# Patient Record
Sex: FEMALE | Race: WHITE | NOT HISPANIC OR LATINO | Employment: OTHER | URBAN - NONMETROPOLITAN AREA
[De-identification: names, ages, dates, MRNs, and addresses within clinical notes are randomized per-mention and may not be internally consistent; named-entity substitution may affect disease eponyms.]

---

## 2017-11-30 ENCOUNTER — COMMUNICATION - GICH (OUTPATIENT)
Dept: SURGERY | Facility: OTHER | Age: 67
End: 2017-11-30

## 2018-01-25 ENCOUNTER — DOCUMENTATION ONLY (OUTPATIENT)
Dept: FAMILY MEDICINE | Facility: OTHER | Age: 68
End: 2018-01-25

## 2018-01-25 PROBLEM — E66.9 OBESITY: Status: ACTIVE | Noted: 2018-01-25

## 2018-07-23 NOTE — PROGRESS NOTES
Patient Information     Patient Name  Libby Elliott MRN  [id number] Sex  Female   1950      Letter by Bahman Lazo MD at      Author:  Bahman Lazo MD Service:  (none) Author Type:  (none)    Filed:   Encounter Date:  2017 Status:  (Other)           Libby Elliott  [address]  [place] [address]          2017    Dear Ms. Elliott:    It has come to our attention that you are due for a colonoscopy.  According to our records, your last colonoscopy was done on 2008.  It  is time for your repeat colonoscopy.  Please contact the Surgery department at [phone] and we will be happy to set up this colonoscopy for you.  If you have had a colonoscopy since your last one with us, please let us know so we can update our records.      Sincerely,       Wendy KO LPN  General Surgery      Reviewed and electronically signed by provider.

## 2018-08-27 ENCOUNTER — OFFICE VISIT (OUTPATIENT)
Dept: INTERNAL MEDICINE | Facility: OTHER | Age: 68
End: 2018-08-27
Attending: NURSE PRACTITIONER
Payer: MEDICARE

## 2018-08-27 VITALS
SYSTOLIC BLOOD PRESSURE: 148 MMHG | WEIGHT: 168.8 LBS | BODY MASS INDEX: 27.13 KG/M2 | TEMPERATURE: 97.5 F | HEIGHT: 66 IN | DIASTOLIC BLOOD PRESSURE: 90 MMHG

## 2018-08-27 DIAGNOSIS — Z12.31 ENCOUNTER FOR SCREENING MAMMOGRAM FOR BREAST CANCER: ICD-10-CM

## 2018-08-27 DIAGNOSIS — Z82.49 FAMILY HISTORY OF ISCHEMIC HEART DISEASE: ICD-10-CM

## 2018-08-27 DIAGNOSIS — R03.0 ELEVATED BLOOD PRESSURE READING WITHOUT DIAGNOSIS OF HYPERTENSION: ICD-10-CM

## 2018-08-27 DIAGNOSIS — Z83.49 FAMILY HISTORY OF THYROID DISEASE: ICD-10-CM

## 2018-08-27 DIAGNOSIS — Z13.29 SCREENING FOR THYROID DISORDER: ICD-10-CM

## 2018-08-27 DIAGNOSIS — Z12.11 SPECIAL SCREENING FOR MALIGNANT NEOPLASMS, COLON: Primary | ICD-10-CM

## 2018-08-27 LAB
ALBUMIN SERPL-MCNC: 4.2 G/DL (ref 3.5–5.7)
ALP SERPL-CCNC: 71 U/L (ref 34–104)
ALT SERPL W P-5'-P-CCNC: 14 U/L (ref 7–52)
ANION GAP SERPL CALCULATED.3IONS-SCNC: 8 MMOL/L (ref 3–14)
AST SERPL W P-5'-P-CCNC: 16 U/L (ref 13–39)
BILIRUB SERPL-MCNC: 0.6 MG/DL (ref 0.3–1)
BUN SERPL-MCNC: 17 MG/DL (ref 7–25)
CALCIUM SERPL-MCNC: 9.7 MG/DL (ref 8.6–10.3)
CHLORIDE SERPL-SCNC: 108 MMOL/L (ref 98–107)
CHOLEST SERPL-MCNC: 243 MG/DL
CO2 SERPL-SCNC: 27 MMOL/L (ref 21–31)
CREAT SERPL-MCNC: 0.9 MG/DL (ref 0.6–1.2)
ERYTHROCYTE [DISTWIDTH] IN BLOOD BY AUTOMATED COUNT: 14.1 % (ref 10–15)
GFR SERPL CREATININE-BSD FRML MDRD: 63 ML/MIN/1.7M2
GLUCOSE SERPL-MCNC: 100 MG/DL (ref 70–105)
HCT VFR BLD AUTO: 45.1 % (ref 35–47)
HDLC SERPL-MCNC: 53 MG/DL (ref 23–92)
HGB BLD-MCNC: 14.6 G/DL (ref 11.7–15.7)
LDLC SERPL CALC-MCNC: 168 MG/DL
MCH RBC QN AUTO: 28.1 PG (ref 26.5–33)
MCHC RBC AUTO-ENTMCNC: 32.4 G/DL (ref 31.5–36.5)
MCV RBC AUTO: 87 FL (ref 78–100)
NONHDLC SERPL-MCNC: 190 MG/DL
PLATELET # BLD AUTO: 219 10E9/L (ref 150–450)
POTASSIUM SERPL-SCNC: 3.9 MMOL/L (ref 3.5–5.1)
PROT SERPL-MCNC: 6.8 G/DL (ref 6.4–8.9)
RBC # BLD AUTO: 5.2 10E12/L (ref 3.8–5.2)
SODIUM SERPL-SCNC: 143 MMOL/L (ref 134–144)
TRIGL SERPL-MCNC: 109 MG/DL
TSH SERPL DL<=0.05 MIU/L-ACNC: 4.54 IU/ML (ref 0.34–5.6)
WBC # BLD AUTO: 7.1 10E9/L (ref 4–11)

## 2018-08-27 PROCEDURE — 80061 LIPID PANEL: CPT | Performed by: NURSE PRACTITIONER

## 2018-08-27 PROCEDURE — 36415 COLL VENOUS BLD VENIPUNCTURE: CPT | Performed by: NURSE PRACTITIONER

## 2018-08-27 PROCEDURE — 93005 ELECTROCARDIOGRAM TRACING: CPT | Performed by: NURSE PRACTITIONER

## 2018-08-27 PROCEDURE — 85027 COMPLETE CBC AUTOMATED: CPT | Performed by: NURSE PRACTITIONER

## 2018-08-27 PROCEDURE — 80053 COMPREHEN METABOLIC PANEL: CPT | Performed by: NURSE PRACTITIONER

## 2018-08-27 PROCEDURE — G0463 HOSPITAL OUTPT CLINIC VISIT: HCPCS

## 2018-08-27 PROCEDURE — 99215 OFFICE O/P EST HI 40 MIN: CPT | Mod: 25 | Performed by: NURSE PRACTITIONER

## 2018-08-27 PROCEDURE — 84443 ASSAY THYROID STIM HORMONE: CPT | Performed by: NURSE PRACTITIONER

## 2018-08-27 PROCEDURE — 93010 ELECTROCARDIOGRAM REPORT: CPT | Performed by: INTERNAL MEDICINE

## 2018-08-27 PROCEDURE — G0463 HOSPITAL OUTPT CLINIC VISIT: HCPCS | Mod: 25

## 2018-08-27 ASSESSMENT — ANXIETY QUESTIONNAIRES
GAD7 TOTAL SCORE: 0
1. FEELING NERVOUS, ANXIOUS, OR ON EDGE: NOT AT ALL
3. WORRYING TOO MUCH ABOUT DIFFERENT THINGS: NOT AT ALL
5. BEING SO RESTLESS THAT IT IS HARD TO SIT STILL: NOT AT ALL
7. FEELING AFRAID AS IF SOMETHING AWFUL MIGHT HAPPEN: NOT AT ALL
6. BECOMING EASILY ANNOYED OR IRRITABLE: NOT AT ALL
2. NOT BEING ABLE TO STOP OR CONTROL WORRYING: NOT AT ALL
IF YOU CHECKED OFF ANY PROBLEMS ON THIS QUESTIONNAIRE, HOW DIFFICULT HAVE THESE PROBLEMS MADE IT FOR YOU TO DO YOUR WORK, TAKE CARE OF THINGS AT HOME, OR GET ALONG WITH OTHER PEOPLE: NOT DIFFICULT AT ALL

## 2018-08-27 ASSESSMENT — PAIN SCALES - GENERAL: PAINLEVEL: NO PAIN (0)

## 2018-08-27 ASSESSMENT — PATIENT HEALTH QUESTIONNAIRE - PHQ9: 5. POOR APPETITE OR OVEREATING: NOT AT ALL

## 2018-08-27 NOTE — LETTER
August 27, 2018      Libby THAKUR Earl  [address] [address]        Dear ,    We are writing to inform you of your test results.    Your blood work all looks good except that your cholesterol levels are elevated.  I recommend you begin a low-fat, low-cholesterol diet and daily exercise of 30-60 minutes per day and then repeating fasting lipid panel in 3-6 months.  High cholesterol levels can increase your risk of heart disease.  If the cholesterol levels do not improve with lifestyle modifications then we may want to assess further or discuss other treatment options.    Resulted Orders   CBC with platelets   Result Value Ref Range    WBC 7.1 4.0 - 11.0 10e9/L    RBC Count 5.20 3.8 - 5.2 10e12/L    Hemoglobin 14.6 11.7 - 15.7 g/dL    Hematocrit 45.1 35.0 - 47.0 %    MCV 87 78 - 100 fl    MCH 28.1 26.5 - 33.0 pg    MCHC 32.4 31.5 - 36.5 g/dL    RDW 14.1 10.0 - 15.0 %    Platelet Count 219 150 - 450 10e9/L   Comprehensive metabolic panel   Result Value Ref Range    Sodium 143 134 - 144 mmol/L    Potassium 3.9 3.5 - 5.1 mmol/L    Chloride 108 (H) 98 - 107 mmol/L    Carbon Dioxide 27 21 - 31 mmol/L    Anion Gap 8 3 - 14 mmol/L    Glucose 100 70 - 105 mg/dL    Urea Nitrogen 17 7 - 25 mg/dL    Creatinine 0.90 0.60 - 1.20 mg/dL    GFR Estimate 63 >60 mL/min/1.7m2    GFR Estimate If Black 76 >60 mL/min/1.7m2    Calcium 9.7 8.6 - 10.3 mg/dL    Bilirubin Total 0.6 0.3 - 1.0 mg/dL    Albumin 4.2 3.5 - 5.7 g/dL    Protein Total 6.8 6.4 - 8.9 g/dL    Alkaline Phosphatase 71 34 - 104 U/L    ALT 14 7 - 52 U/L    AST 16 13 - 39 U/L   Thyrotropin GH   Result Value Ref Range    Thyrotropin 4.54 0.34 - 5.60 IU/mL   Lipid Profile   Result Value Ref Range    Cholesterol 243 (H) <200 mg/dL    Triglycerides 109 <150 mg/dL    HDL Cholesterol 53 23 - 92 mg/dL    LDL Cholesterol Calculated 168 (H) <100 mg/dL      Comment:      Above desirable:  100-129 mg/dl  Borderline High:  130-159 mg/dL  High:              160-189 mg/dL  Very high:       >189 mg/dl      Non HDL Cholesterol 190 (H) <130 mg/dL      Comment:      Above Desirable:  130-159 mg/dl  Borderline high:  160-189 mg/dl  High:             190-219 mg/dl  Very high:       >219 mg/dl         If you have any questions or concerns, please call the clinic at the number listed above.       Sincerely,        Jeane Cruz NP

## 2018-08-27 NOTE — PROGRESS NOTES
Subjective:  She is here today for colon cancer screening.  She is due for preoperative optimization.  She reports she is doing well.  Her blood pressure is elevated today in the clinic however when she checked at home 2 months ago blood pressure was normal with a systolic pressure in the 130s.  She has a family history of heart disease in her dad.  She is fasting today and due for lipids.  She has never had thyroid checked but she does have a family history of thyroid disease in her grandmother.  She has not had mammography in several years.  She would like to have mammogram scheduled.  She is not interested in bone density scan or immunizations at this time.  She has never had any problems with anesthesia.  No skin infections.    Patient Active Problem List   Diagnosis     Obesity     Past Medical History:   Diagnosis Date     Asymptomatic menopausal state     approximately age 47     Past Surgical History:   Procedure Laterality Date     ANKLE SURGERY      achilles tendon repair and heel spur removal      SECTION      x three     COLONOSCOPY      08,Next colonoscopy due in 2018.     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     Social History     Social History     Marital status:      Spouse name: N/A     Number of children: N/A     Years of education: N/A     Occupational History     Not on file.     Social History Main Topics     Smoking status: Never Smoker     Smokeless tobacco: Never Used     Alcohol use Yes      Comment: very seldom      Drug use: No     Sexual activity: Not on file     Other Topics Concern     Not on file     Social History Narrative    Marcela Child; 82.    Citlali Child; 86.    Dexter Child; 74.     Ronald Spouse; date of birth 53.     Family History   Problem Relation Age of Onset     Breast Cancer Mother   "    Cancer-breast,living in her 80s     Cancer Mother      Cancer,kidney cancer     HEART DISEASE Father      Cancer Sister      Cancer,lung cancer, was a smoker     Cancer Sister      Cancer,lung CA     Other - See Comments Sister      hysterectomy     Family History Negative Sister      Good Health     Family History Negative Brother      Good Health     Family History Negative Brother      Good Health     Family History Negative Daughter      Good Health,02/28/80.     Family History Negative Daughter      Good Health,01/24/84.     Family History Negative Son      Good Health,03/21/72.     Family History Negative Other      Good Health,date of birth [birth year].     Thyroid Disease Maternal Grandmother      No current outpatient prescriptions on file.     Review of patient's allergies indicates no known allergies.      Review of Systems:  Review of Systems  12 point complete review of systems discussed with patient, see HPI for positive findings.    Objective:   /90 (BP Location: Right arm, Patient Position: Chair, Cuff Size: Adult Regular)  Temp 97.5  F (36.4  C) (Tympanic)  Ht 5' 5.5\" (1.664 m)  Wt 168 lb 12.8 oz (76.6 kg)  Breastfeeding? No  BMI 27.66 kg/m2  Physical Exam  Pleasant female no acute distress.  Affect normal.  Alert and oriented ×4.  Sclera nonicteric.  Conjunctiva noninflamed.  TMs clear bilaterally.  Mucous membranes moist.  Throat without erythema.  Neck supple and without adenopathy.  No thyromegaly.  No carotid bruits.  Lung fields clear to auscultation throughout.  Cardiovascular regular rate and rhythm.  Abdomen soft without masses, tenderness and organomegaly.  No hepatosplenomegaly.  No abdominal bruits or pulsatile masses.  Extremities without edema.  DPPT intact bilaterally.  Capillary refill less than 3 seconds.  Gait is stable.  EKG: Sinus rhythm, rate 63 with left anterior fascicular block.  No previous for comparison    Assessment:    ICD-10-CM    1. Special screening for " malignant neoplasms, colon Z12.11 GASTROENTEROLOGY ADULT REF PROCEDURE ONLY   2. Elevated blood pressure reading without diagnosis of hypertension R03.0 CBC with platelets     Comprehensive metabolic panel   3. Encounter for screening mammogram for breast cancer Z12.31 MA Screen Bilateral w/Epifanio   4. Family history of ischemic heart disease Z82.49 Lipid Profile     EKG 12-lead, tracing only   5. Screening for thyroid disorder Z13.29 Thyrotropin GH   6. Family history of thyroid disease Z83.49        Plan:   Patient is need of colonoscopy screening.  Her test has been scheduled.  May proceed with colonoscopy.  She will avoid aspirin, NSAIDs and all vitamins and nutritional supplements.  Blood pressure is mildly elevated today in clinic with previous readings being normal when checked at home.  I have asked that she check her blood pressure 2 or 3 times weekly with a goal systolic blood pressure less than 135.  If she is finding that is frequently higher than she needs to follow-up for treatment.  She is referred for bilateral mammography.  She has a family history of heart disease in her father and thyroid disease in her grandmother.  She will have labs today to include CBC, chemistry panel, lipids and TSH.  Recommended bone density scan and consideration of immunizations to include pneumonia vaccine and influenza vaccine.    40 minutes face-to-face time sent with patient with a 50% in care coordination and counseling.  THERESA Barry   8/27/2018  9:43 AM

## 2018-08-27 NOTE — NURSING NOTE
"Chief Complaint   Patient presents with     Physical     And needs colonoscopy done .     Initial /90 (BP Location: Right arm, Patient Position: Chair, Cuff Size: Adult Regular)  Temp 97.5  F (36.4  C) (Tympanic)  Ht 5' 5.5\" (1.664 m)  Wt 168 lb 12.8 oz (76.6 kg)  Breastfeeding? No  BMI 27.66 kg/m2 Estimated body mass index is 27.66 kg/(m^2) as calculated from the following:    Height as of this encounter: 5' 5.5\" (1.664 m).    Weight as of this encounter: 168 lb 12.8 oz (76.6 kg).  Medication Reconciliation: complete       Patient denies any fever of chills in the past two months, no history of MRSA or sleep apnea. She tells me does not have high blood pressure is/has been treated for blood pressure. Needs tetanus. She has no health care directive on file.       Maryann Reilly LPN on 8/27/2018 at 8:56 AM    "

## 2018-08-27 NOTE — MR AVS SNAPSHOT
After Visit Summary   8/27/2018    Libby Elliott    MRN: [id number]           Patient Information     Date Of Birth          1950        Visit Information        Provider Department      8/27/2018 9:00 AM Jeane Cruz NP [facility]        Today's Diagnoses     Special screening for malignant neoplasms, colon    -  1    Elevated blood pressure reading without diagnosis of hypertension        Encounter for screening mammogram for breast cancer        Family history of ischemic heart disease        Screening for thyroid disorder        Family history of thyroid disease           Follow-ups after your visit        Additional Services     GASTROENTEROLOGY ADULT REF PROCEDURE ONLY                 Future tests that were ordered for you today     Open Future Orders        Priority Expected Expires Ordered    Lipid Profile Routine  8/28/2019 8/27/2018    MA Screen Bilateral w/Epifanio Routine  8/27/2019 8/27/2018    CBC with platelets Routine  8/28/2019 8/27/2018    Comprehensive metabolic panel Routine  8/28/2019 8/27/2018    Thyrotropin GH Routine  8/28/2019 8/27/2018            Who to contact     If you have questions or need follow up information about today's clinic visit or your schedule please contact [facility] directly at [phone].  Normal or non-critical lab and imaging results will be communicated to you by MyChart, letter or phone within 4 business days after the clinic has received the results. If you do not hear from us within 7 days, please contact the clinic through MyChart or phone. If you have a critical or abnormal lab result, we will notify you by phone as soon as possible.  Submit refill requests through [employer] or call your pharmacy and they will forward the refill request to us. Please allow 3 business days for your refill to be completed.          Additional Information About Your Visit        Care EveryWhere ID     This is your  "Care EveryWhere ID. This could be used by other organizations to access your [place] medical records  [id number]        Your Vitals Were     Temperature Height Breastfeeding? BMI (Body Mass Index)          97.5  F (36.4  C) (Tympanic) 5' 5.5\" (1.664 m) No 27.66 kg/m2         Blood Pressure from Last 3 Encounters:   08/27/18 148/90    Weight from Last 3 Encounters:   08/27/18 168 lb 12.8 oz (76.6 kg)              We Performed the Following     EKG 12-lead, tracing only     GASTROENTEROLOGY ADULT REF PROCEDURE ONLY        Primary Care Provider Office Phone # Fax #    [facility] [phone] [address]  [facility] [address]        Equal Access to Services     JUN CID : [place], waaxda luqadaha, qaybta kaalmada adewarrenyagio, brady reid . So [facility] [phone].    ATENCIÓN: Si habla español, tiene a flowers disposición servicios gratuitos de asistencia lingüística. Llame al [phone].    We comply with applicable federal civil rights laws and [place] laws. We do not discriminate on the basis of race, color, national origin, age, disability, sex, sexual orientation, or gender identity.            Thank you!     Thank you for choosing [facility]  for your care. Our goal is always to provide you with excellent care. Hearing back from our patients is one way we can continue to improve our services. Please take a few minutes to complete the written survey that you may receive in the mail after your visit with us. Thank you!             Your Updated Medication List - Protect others around you: Learn how to safely use, store and throw away your medicines at www.disposemymeds.org.      Notice  As of 8/27/2018  9:53 AM    You have not been prescribed any medications.      "

## 2018-08-28 DIAGNOSIS — Z00.00 HEALTHCARE MAINTENANCE: Primary | ICD-10-CM

## 2018-08-28 RX ORDER — BISACODYL 5 MG/1
TABLET, DELAYED RELEASE ORAL
Qty: 2 TABLET | Refills: 0 | Status: ON HOLD | OUTPATIENT
Start: 2018-08-28 | End: 2018-10-01

## 2018-08-28 RX ORDER — POLYETHYLENE GLYCOL 3350, SODIUM CHLORIDE, SODIUM BICARBONATE, POTASSIUM CHLORIDE 420; 11.2; 5.72; 1.48 G/4L; G/4L; G/4L; G/4L
4000 POWDER, FOR SOLUTION ORAL ONCE
Qty: 4000 ML | Refills: 0 | Status: SHIPPED | OUTPATIENT
Start: 2018-08-28 | End: 2018-08-28

## 2018-08-28 ASSESSMENT — ANXIETY QUESTIONNAIRES: GAD7 TOTAL SCORE: 0

## 2018-08-28 ASSESSMENT — PATIENT HEALTH QUESTIONNAIRE - PHQ9: SUM OF ALL RESPONSES TO PHQ QUESTIONS 1-9: 0

## 2018-08-28 NOTE — TELEPHONE ENCOUNTER
Screening Questions for the Scheduling of Screening Colonoscopies   (If Colonoscopy is diagnostic, Provider should review the chart before scheduling.)  Are you younger than 50 or older than 80?  NO   Do you take aspirin or fish oil?  NO  (if yes, tell patient to stop 1 week prior to Colonoscopy)  Do you take warfarin (Coumadin), clopidogrel (Plavix), apixaban (Eliquis), dabigatram (Pradaxa), rivaroxaban (Xarelto) or any blood thinner? NO   Do you use oxygen at home?   NO   Do you have kidney disease?   NO   Are you on dialysis?  NO   Have you had a stroke or heart attack in the last year?   NO   Have you had a stent in your heart or any blood vessel in the last year?  NO   Have you had a transplant of any organ?  NO   Have you had a colonoscopy or upper endoscopy (EGD) before?   YES          When?  2008  -  [facility]    Date of scheduled Colonoscopy. 10/01/2018  Provider  MICHELL   Pharmacy   [facility]

## 2018-09-04 ENCOUNTER — HOSPITAL ENCOUNTER (OUTPATIENT)
Dept: MAMMOGRAPHY | Facility: OTHER | Age: 68
Discharge: HOME | End: 2018-09-04
Attending: NURSE PRACTITIONER | Admitting: NURSE PRACTITIONER
Payer: MEDICARE

## 2018-09-04 DIAGNOSIS — Z12.31 ENCOUNTER FOR SCREENING MAMMOGRAM FOR BREAST CANCER: ICD-10-CM

## 2018-09-04 PROCEDURE — 77063 BREAST TOMOSYNTHESIS BI: CPT

## 2018-09-09 ENCOUNTER — OFFICE VISIT (OUTPATIENT)
Dept: FAMILY MEDICINE | Facility: OTHER | Age: 68
End: 2018-09-09
Payer: MEDICARE

## 2018-09-09 VITALS
SYSTOLIC BLOOD PRESSURE: 144 MMHG | WEIGHT: 167.4 LBS | TEMPERATURE: 97.6 F | DIASTOLIC BLOOD PRESSURE: 98 MMHG | BODY MASS INDEX: 27.43 KG/M2 | OXYGEN SATURATION: 99 % | HEART RATE: 69 BPM
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
## 2018-09-09 DIAGNOSIS — S30.860A TICK BITE OF BACK, INITIAL ENCOUNTER: Primary | ICD-10-CM

## 2018-09-09 PROCEDURE — G0463 HOSPITAL OUTPT CLINIC VISIT: HCPCS

## 2018-09-09 PROCEDURE — 99213 OFFICE O/P EST LOW 20 MIN: CPT | Performed by: FAMILY MEDICINE

## 2018-09-09 RX ORDER — DOXYCYCLINE HYCLATE 100 MG
100 TABLET ORAL 2 TIMES DAILY
Qty: 28 TABLET | Refills: 0 | Status: SHIPPED | OUTPATIENT
Start: 2018-09-09 | End: 2018-09-24

## 2018-09-09 NOTE — PROGRESS NOTES
SUBJECTIVE:   Libby Elliott is a 67 year old female who presents to clinic today for the following health issues:    PARVIN Souza is here for concerns of a tick bite.  She noticed it on her L sided mid back, just below bra line this morning after her shower.  She is outside, walking throughout their fields often, uncertain how long it was attached.  Does not have tick for review, but said it was extremely small with some white on the back.  Unable to ID on our tick cards here.  She denies any fever, chills, rash, joint pains, headaches.    Patient Active Problem List    Diagnosis Date Noted     Obesity 2018     Priority: Medium     Past Medical History:   Diagnosis Date     Asymptomatic menopausal state     approximately age 47      Past Surgical History:   Procedure Laterality Date     ANKLE SURGERY      achilles tendon repair and heel spur removal      SECTION      x three     COLONOSCOPY      08,Next colonoscopy due in 2018.     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     Family History   Problem Relation Age of Onset     Breast Cancer Mother      Cancer-breast,living in her 80s     Cancer Mother      Cancer,kidney cancer     HEART DISEASE Father      Cancer Sister      Cancer,lung cancer, was a smoker     Cancer Sister      Cancer,lung CA     Other - See Comments Sister      hysterectomy     Family History Negative Sister      Good Health     Family History Negative Brother      Good Health     Family History Negative Brother      Good Health     Family History Negative Daughter      Good Health,80.     Family History Negative Daughter      Good Health,84.     Family History Negative Son      Good Health,72.     Family History Negative Other      Good Health,date of birth 53.     Thyroid Disease Maternal Grandmother       Social History   Substance Use Topics     Smoking status: Never Smoker     Smokeless tobacco: Never Used     Alcohol use Yes      Comment: very seldom      Social History     Social History Narrative    Marcela Child; 02/28/80.    Citlali Child; 01/24/84.    Dexter Child; 03/21/72.     Ronald Spouse; date of birth 12/04/51.     Current Outpatient Prescriptions   Medication Sig Dispense Refill     doxycycline (VIBRA-TABS) 100 MG tablet Take 1 tablet (100 mg) by mouth 2 times daily 28 tablet 0     bisacodyl (DULCOLAX) 5 MG EC tablet Take as directed by colonoscopy prep instructions (Patient not taking: Reported on 9/9/2018) 2 tablet 0     No Known Allergies    Review of Systems   All other systems reviewed and are negative.       OBJECTIVE:     BP (!) 144/98  Pulse 69  Temp 97.6  F (36.4  C) (Tympanic)  Wt 167 lb 6.4 oz (75.9 kg)  SpO2 99%  Breastfeeding? No  BMI 27.43 kg/m2  Body mass index is 27.43 kg/(m^2).  Physical Exam   Constitutional: She appears well-developed and well-nourished. No distress.   HENT:   Head: Normocephalic and atraumatic.   Skin: She is not diaphoretic.        Quarter size erythematous spot with tiny small black eschar centrally.  No obvious tick parts.  Non tender.  No fluctuance or induration.   Nursing note and vitals reviewed.    Diagnostic Test Results:  none     ASSESSMENT/PLAN:     1. Tick bite of back, initial encounter  Discussed options of monitoring for symptoms vs treating.  Patient elects to treat due to endemic area.  Rx for doxycycline x 14 days.  Declines testing due to high likelihood of being seronegative at this time.    Will notify PCP of further concerns, symptoms, etc.  Encouraged to take med with food.  - doxycycline (VIBRA-TABS) 100 MG tablet; Take 1 tablet (100 mg) by mouth 2 times daily  Dispense: 28 tablet; Refill: 0    DO DYLAN Olmedo [facility]

## 2018-09-09 NOTE — NURSING NOTE
Patient presents to clinic today for a tick bite. Patient found it this morning on her left mid back. Her  took it off with a tweezers. There was no blood.  Nia Thompson CMA..............9/9/2018........10:51 AM

## 2018-09-09 NOTE — MR AVS SNAPSHOT
After Visit Summary   9/9/2018    Libby Elliott    MRN: [id number]           Patient Information     Date Of Birth          1950        Visit Information        Provider Department      9/9/2018 10:45 AM Heidi Patel DO [facility]        Today's Diagnoses     Tick bite of back, initial encounter    -  1       Follow-ups after your visit        Your next 10 appointments already scheduled     Oct 01, 2018   Procedure with Bahman Lazo MD   [facility] ([facility])    [address]  [place] [address] [phone]              Who to contact     If you have questions or need follow up information about today's clinic visit or your schedule please contact [facility] directly at [phone].  Normal or non-critical lab and imaging results will be communicated to you by MyChart, letter or phone within 4 business days after the clinic has received the results. If you do not hear from us within 7 days, please contact the clinic through MyChart or phone. If you have a critical or abnormal lab result, we will notify you by phone as soon as possible.  Submit refill requests through [employer] or call your pharmacy and they will forward the refill request to us. Please allow 3 business days for your refill to be completed.          Additional Information About Your Visit        Care EveryWhere ID     This is your Care EveryWhere ID. This could be used by other organizations to access your [place] medical records  [id number]        Your Vitals Were     Pulse Temperature Pulse Oximetry Breastfeeding? BMI (Body Mass Index)       69 97.6  F (36.4  C) (Tympanic) 99% No 27.43 kg/m2        Blood Pressure from Last 3 Encounters:   09/09/18 (!) 144/98   08/27/18 148/90    Weight from Last 3 Encounters:   09/09/18 167 lb 6.4 oz (75.9 kg)   08/27/18 168 lb 12.8 oz (76.6 kg)              Today, you had the  following     No orders found for display         Today's Medication Changes          These changes are accurate as of 9/9/18 12:17 PM.  If you have any questions, ask your nurse or doctor.               Start taking these medicines.        Dose/Directions    doxycycline 100 MG tablet   Commonly known as:  VIBRA-TABS   Used for:  Tick bite of back, initial encounter   Started by:  Heidi Patel DO        Dose:  100 mg   Take 1 tablet (100 mg) by mouth 2 times daily   Quantity:  28 tablet   Refills:  0            Where to get your medicines      These medications were sent to [facility] [address] [address]     Phone:  [phone]     doxycycline 100 MG tablet                Primary Care Provider Office Phone # Fax #    [facility] [phone]       [address] [facility] [address]        Equal Access to Services     JUN CID : Hadii ginger almonteo [place], waaxda luqadaha, qaybta kaalmada adeegyada, brady reid . So [facility] [phone].    ATENCIÓN: Si habla español, tiene a flowers disposición servicios gratuitos de asistencia lingüística. Llame al [phone].    We comply with applicable federal civil rights laws and [place] laws. We do not discriminate on the basis of race, color, national origin, age, disability, sex, sexual orientation, or gender identity.            Thank you!     Thank you for choosing [facility]  for your care. Our goal is always to provide you with excellent care. Hearing back from our patients is one way we can continue to improve our services. Please take a few minutes to complete the written survey that you may receive in the mail after your visit with us. Thank you!             Your Updated Medication List - Protect others around you: Learn how to safely use, store and throw away your medicines at  www.disposemymeds.org.          This list is accurate as of 9/9/18 12:17 PM.  Always use your most recent med list.                   Brand Name Dispense Instructions for use Diagnosis    bisacodyl 5 MG EC tablet    DULCOLAX    2 tablet    Take as directed by colonoscopy prep instructions    Healthcare maintenance       doxycycline 100 MG tablet    VIBRA-TABS    28 tablet    Take 1 tablet (100 mg) by mouth 2 times daily    Tick bite of back, initial encounter

## 2018-09-13 ENCOUNTER — TELEPHONE (OUTPATIENT)
Dept: INTERNAL MEDICINE | Facility: OTHER | Age: 68
End: 2018-09-13

## 2018-09-13 NOTE — TELEPHONE ENCOUNTER
Patient is schedule for a colonocopy on 10/1.  She has been having some pain when sitting and other and would like to know if she should see RKV before the colonocopy to discuss.  Please call to discuss and advise.  Kath Pedraza on 9/13/2018 at 11:51 AM

## 2018-09-13 NOTE — TELEPHONE ENCOUNTER
Patient concerned about having some tenderness and burning the last couple weeks in rectum. Wonders if she should see you before colonoscopy on  10/1/18?  Haydee Irene LPN ....................9/13/2018   12:49 PM

## 2018-09-14 NOTE — TELEPHONE ENCOUNTER
Spoke with patient and she said this can wait until Monday for Dr. Clifton HARPER.  Katie Ferguson LPN..........9/14/2018  9:50 AM

## 2018-09-14 NOTE — TELEPHONE ENCOUNTER
Nursing: Please discuss this with the provider who is doing her colonoscopy.  I suspect they can evaluate the area on the day of the colonoscopy

## 2018-09-17 NOTE — TELEPHONE ENCOUNTER
Spoke with patient and she does have some discomfort in the rectum, hard to sit.  She may follow up with Sylvia Cruz NP before her colonoscopy.  Katie Ferguson LPN..........9/17/2018  8:00 AM

## 2018-09-26 PROBLEM — Z00.00 HEALTHCARE MAINTENANCE: Status: ACTIVE | Noted: 2018-09-26

## 2018-10-01 ENCOUNTER — HOSPITAL ENCOUNTER (OUTPATIENT)
Facility: OTHER | Age: 68
Discharge: HOME | End: 2018-10-01
Attending: SURGERY | Admitting: SURGERY
Payer: MEDICARE

## 2018-10-01 ENCOUNTER — SURGERY (OUTPATIENT)
Age: 68
End: 2018-10-01

## 2018-10-01 ENCOUNTER — ANESTHESIA (OUTPATIENT)
Dept: SURGERY | Facility: OTHER | Age: 68
End: 2018-10-01
Payer: MEDICARE

## 2018-10-01 ENCOUNTER — ANESTHESIA EVENT (OUTPATIENT)
Dept: SURGERY | Facility: OTHER | Age: 68
End: 2018-10-01
Payer: MEDICARE

## 2018-10-01 VITALS
RESPIRATION RATE: 16 BRPM | SYSTOLIC BLOOD PRESSURE: 109 MMHG | WEIGHT: 165 LBS | HEART RATE: 69 BPM | TEMPERATURE: 97.4 F | BODY MASS INDEX: 27.04 KG/M2 | DIASTOLIC BLOOD PRESSURE: 57 MMHG | OXYGEN SATURATION: 98 %

## 2018-10-01 PROBLEM — K63.5 COLON POLYPS: Status: ACTIVE | Noted: 2018-10-01

## 2018-10-01 PROCEDURE — 25000128 H RX IP 250 OP 636: Performed by: NURSE ANESTHETIST, CERTIFIED REGISTERED

## 2018-10-01 PROCEDURE — 45384 COLONOSCOPY W/LESION REMOVAL: CPT | Performed by: NURSE ANESTHETIST, CERTIFIED REGISTERED

## 2018-10-01 PROCEDURE — 25000125 ZZHC RX 250: Performed by: NURSE ANESTHETIST, CERTIFIED REGISTERED

## 2018-10-01 PROCEDURE — 25000128 H RX IP 250 OP 636: Performed by: SURGERY

## 2018-10-01 PROCEDURE — 45384 COLONOSCOPY W/LESION REMOVAL: CPT | Mod: PT | Performed by: SURGERY

## 2018-10-01 PROCEDURE — 45380 COLONOSCOPY AND BIOPSY: CPT | Performed by: SURGERY

## 2018-10-01 PROCEDURE — 40000010 ZZH STATISTIC ANES STAT CODE-CRNA PER MINUTE: Performed by: SURGERY

## 2018-10-01 PROCEDURE — 25000132 ZZH RX MED GY IP 250 OP 250 PS 637: Mod: GY | Performed by: SURGERY

## 2018-10-01 PROCEDURE — 25000125 ZZHC RX 250: Performed by: SURGERY

## 2018-10-01 PROCEDURE — 88305 TISSUE EXAM BY PATHOLOGIST: CPT

## 2018-10-01 RX ORDER — LIDOCAINE HYDROCHLORIDE 20 MG/ML
INJECTION, SOLUTION INFILTRATION; PERINEURAL PRN
Status: DISCONTINUED | OUTPATIENT
Start: 2018-10-01 | End: 2018-10-01

## 2018-10-01 RX ORDER — SODIUM CHLORIDE, SODIUM LACTATE, POTASSIUM CHLORIDE, CALCIUM CHLORIDE 600; 310; 30; 20 MG/100ML; MG/100ML; MG/100ML; MG/100ML
INJECTION, SOLUTION INTRAVENOUS CONTINUOUS
Status: DISCONTINUED | OUTPATIENT
Start: 2018-10-01 | End: 2018-10-01 | Stop reason: HOSPADM

## 2018-10-01 RX ORDER — SIMETHICONE
LIQUID (ML) MISCELLANEOUS PRN
Status: DISCONTINUED | OUTPATIENT
Start: 2018-10-01 | End: 2018-10-01 | Stop reason: HOSPADM

## 2018-10-01 RX ORDER — PROPOFOL 10 MG/ML
INJECTION, EMULSION INTRAVENOUS PRN
Status: DISCONTINUED | OUTPATIENT
Start: 2018-10-01 | End: 2018-10-01

## 2018-10-01 RX ORDER — PROPOFOL 10 MG/ML
INJECTION, EMULSION INTRAVENOUS CONTINUOUS PRN
Status: DISCONTINUED | OUTPATIENT
Start: 2018-10-01 | End: 2018-10-01

## 2018-10-01 RX ORDER — LIDOCAINE 40 MG/G
CREAM TOPICAL
Status: DISCONTINUED | OUTPATIENT
Start: 2018-10-01 | End: 2018-10-01 | Stop reason: HOSPADM

## 2018-10-01 RX ORDER — NALOXONE HYDROCHLORIDE 0.4 MG/ML
.1-.4 INJECTION, SOLUTION INTRAMUSCULAR; INTRAVENOUS; SUBCUTANEOUS
Status: DISCONTINUED | OUTPATIENT
Start: 2018-10-01 | End: 2018-10-01 | Stop reason: HOSPADM

## 2018-10-01 RX ORDER — ONDANSETRON 2 MG/ML
4 INJECTION INTRAMUSCULAR; INTRAVENOUS
Status: DISCONTINUED | OUTPATIENT
Start: 2018-10-01 | End: 2018-10-01 | Stop reason: HOSPADM

## 2018-10-01 RX ORDER — FLUMAZENIL 0.1 MG/ML
0.2 INJECTION, SOLUTION INTRAVENOUS
Status: DISCONTINUED | OUTPATIENT
Start: 2018-10-01 | End: 2018-10-01 | Stop reason: HOSPADM

## 2018-10-01 RX ADMIN — PROPOFOL 135 MCG/KG/MIN: 10 INJECTION, EMULSION INTRAVENOUS at 08:18

## 2018-10-01 RX ADMIN — LIDOCAINE HYDROCHLORIDE 80 MG: 20 INJECTION, SOLUTION INFILTRATION; PERINEURAL at 08:18

## 2018-10-01 RX ADMIN — SODIUM CHLORIDE, SODIUM LACTATE, POTASSIUM CHLORIDE, AND CALCIUM CHLORIDE: 600; 310; 30; 20 INJECTION, SOLUTION INTRAVENOUS at 07:36

## 2018-10-01 RX ADMIN — Medication 0.6 ML: at 08:46

## 2018-10-01 RX ADMIN — WATER 200 ML: 1 IRRIGANT IRRIGATION at 08:46

## 2018-10-01 RX ADMIN — PROPOFOL 80 MG: 10 INJECTION, EMULSION INTRAVENOUS at 08:18

## 2018-10-01 NOTE — IP AVS SNAPSHOT
[facility]    [address]    [place] [address]    Phone:  [phone]    Fax:  [phone]                                       After Visit Summary   10/1/2018    Libby Elliott    MRN: [id number]           After Visit Summary Signature Page     I have received my discharge instructions, and my questions have been answered. I have discussed any challenges I see with this plan with the nurse or doctor.    ..........................................................................................................................................  Patient/Patient Representative Signature      ..........................................................................................................................................  Patient Representative Print Name and Relationship to Patient    ..................................................               ................................................  Date                                   Time    ..........................................................................................................................................  Reviewed by Signature/Title    ...................................................              ..............................................  Date                                               Time          22EPIC Rev 08/18

## 2018-10-01 NOTE — ANESTHESIA POSTPROCEDURE EVALUATION
Patient: Libby Elliott    Procedure(s):  Colonoscopy - Wound Class: III-Contaminated    Diagnosis:screening  Diagnosis Additional Information: No value filed.    Anesthesia Type:  MAC    Note:  Anesthesia Post Evaluation    Patient location during evaluation: Phase 2 and Endoscopy Recovery  Patient participation: Able to fully participate in evaluation  Level of consciousness: awake and alert  Pain management: adequate  Airway patency: patent  Cardiovascular status: acceptable  Respiratory status: acceptable  Hydration status: acceptable  PONV: none     Anesthetic complications: None          Last vitals:  Vitals:    10/01/18 0900 10/01/18 0915 10/01/18 0930   BP: 104/60 101/63 109/57   Pulse:      Resp:      Temp:      SpO2: 99% 99% 98%         Electronically Signed By: ELEANOR De Leon CRNA  October 1, 2018  9:50 AM

## 2018-10-01 NOTE — ANESTHESIA PREPROCEDURE EVALUATION
Anesthesia Evaluation     . Pt has had prior anesthetic. Type: MAC    No history of anesthetic complications          ROS/MED HX    ENT/Pulmonary:  - neg pulmonary ROS     Neurologic:  - neg neurologic ROS     Cardiovascular:  - neg cardiovascular ROS       METS/Exercise Tolerance:  >4 METS   Hematologic:  - neg hematologic  ROS       Musculoskeletal:  - neg musculoskeletal ROS       GI/Hepatic:  - neg GI/hepatic ROS       Renal/Genitourinary:  - ROS Renal section negative       Endo:  - neg endo ROS   (+) Obesity, .      Psychiatric:  - neg psychiatric ROS       Infectious Disease:  - neg infectious disease ROS       Malignancy:      - no malignancy   Other:    (+) No chance of pregnancy C-spine cleared: N/A, no H/O Chronic Pain,no other significant disability   - neg other ROS                 Physical Exam  Normal systems: cardiovascular, pulmonary and dental    Airway   Mallampati: II  TM distance: >3 FB  Neck ROM: full    Dental     Cardiovascular   Rhythm and rate: regular and normal      Pulmonary    breath sounds clear to auscultation                    Anesthesia Plan      History & Physical Review      ASA Status:  2 .    NPO Status:  > 6 hours    Plan for MAC with Propofol induction.          Postoperative Care      Consents  Anesthetic plan, risks, benefits and alternatives discussed with:  Patient..                          .

## 2018-10-01 NOTE — IP AVS SNAPSHOT
MRN:[id number]                      After Visit Summary   10/1/2018    Libby Elliott    MRN: [id number]           Thank you!     Thank you for choosing [facility] for your care. Our goal is always to provide you with excellent care. Hearing back from our patients is one way we can continue to improve our services. Please take a few minutes to complete the written survey that you may receive in the mail after you visit with us. Thank you!        Patient Information     Date Of Birth          1950        About your hospital stay     You were admitted on:  October 1, 2018 You last received care in the:  [facility]    You were discharged on:  October 1, 2018       Who to Call     For medical emergencies, please call 911.  For non-urgent questions about your medical care, please call your primary care provider or clinic, [phone]  For questions related to your surgery, please call your surgery clinic        Attending Provider     Provider Specialty    Bahman Lazo MD Surgery       Primary Care Provider Office Phone # Fax #    [facility] [phone]      After Care Instructions     Discharge Instructions       No driving or operating machinery until the day after procedure..postfiber            Discharge Instructions       Resume pre procedure diet and medications.  Your doctor recommends that you eat 25 to 30 Grams of fiber daily. The following are some examples of fiber amounts in different foods.    Fruits: Apple (with skin) 1 medium = 4.4 Grams   Banana      1 medium = 3.1 Grams   Oranges     1 orange = 3.1 Grams   Prunes     1 cup, pitted = 12.4 Grams    Juices: Apple, unsweetened w/ added ascorbic acid  1 cup = 0.5 Grams    Grapefruit, white, canned,sweetened  1 cup = 0.2 Grams    Grape, unsweetened w/ added ascorbic acid  1 cup = 0.5 Grams    Orange     1 cup = 0.7 Grams    Vegetables:   Cooked: Green Beans   1 cup = 4.0 Grams        Carrots   1/2 cup sliced = 2.3 Grams       Peas       1 cup = 8.8 Grams       Potato (baked, with skin)  1 medium = 3.8 Grams    Raw: Cucumber (with peel)  1 cucumber = 1.5 Grams            Lettuce     1 cup shredded = 0.5 Grams            Tomato   1 medium tomato = 1.5 Grams            Spinach  1 cup = 0.7 Grams    Legumes: Baked beans, canned, no salt added  1 cup = 13.9 Grams         Kidney Beans, canned  1 cup = 13.6 Grams         Barnard Beans, canned     1 cup = 11.6 Grams         Lentils, boiled   1 cup = 15.6 Grams    Breads, Pastas, Flours: Bran muffins   1 medium muffin = 5.2 Grams           Oatmeal, cooked  1 cup = 4.0 Grams           White Bread   1 slice = 0.6 Grams           Whole- wheat bread = 1.9 Grams    Pasta and rice, cooked: Macaroni  1 cup = 2.5 Grams           Rice, Brown  1 cup = 3.5 Grams           Rice, white   1 cup = 0.6 Grams           Spaghetti (regular) 1 cup = 2.5 Grams    Nuts: Almonds   1 cup = 17.4 Grams            Peanuts    1 cup = 12.4 Grams            Discharge Instructions       Call [phone] for questions or concerns. Call for increased abdominal pain, rectal bleeding, persistent vomiting or fever over 101.5 F  You will receive a letter in about one week with results.                  Pending Results     Date and Time Order Name Status Description    10/1/2018 0831 Surgical pathology exam In process             Admission Information     Date & Time Provider Department Dept. Phone    10/1/2018 Bahman Lazo MD [facility] [phone]      Your Vitals Were     Blood Pressure Pulse Temperature Respirations Weight Pulse Oximetry    104/60 69 97.4  F (36.3  C) (Temporal) 16 74.8 kg (165 lb) 99%    BMI (Body Mass Index)                   27.04 kg/m2           Care EveryWhere ID     This is your Care EveryWhere ID. This could be used by other organizations to access your [place] medical records  [id number]        Equal Access to Services     JUN CID AH:  Hadii ginger majano Sonieshaali, waaxda luqadaha, qaybta kaalmada brentonrika, brady langefrancaafia reid sheila. So [facility] [phone].    ATENCIÓN: Si habla español, tiene a flowers disposición servicios gratuitos de asistencia lingüística. Llame al [phone].    We comply with applicable federal civil rights laws and [place] laws. We do not discriminate on the basis of race, color, national origin, age, disability, sex, sexual orientation, or gender identity.               Review of your medicines      Notice     You have not been prescribed any medications.             Protect others around you: Learn how to safely use, store and throw away your medicines at www.disposemymeds.org.             Medication List: This is a list of all your medications and when to take them. Check marks below indicate your daily home schedule. Keep this list as a reference.      Notice     You have not been prescribed any medications.

## 2018-10-01 NOTE — ANESTHESIA CARE TRANSFER NOTE
Patient: Libby Elliott    Procedure(s):  Colonoscopy - Wound Class: III-Contaminated    Diagnosis: screening  Diagnosis Additional Information: No value filed.    Anesthesia Type:   MAC     Note:  Airway :Room Air  Patient transferred to:Phase II  Handoff Report: Identifed the Patient, Identified the Reponsible Provider, Reviewed the pertinent medical history, Discussed the surgical course, Reviewed Intra-OP anesthesia mangement and issues during anesthesia, Set expectations for post-procedure period and Allowed opportunity for questions and acknowledgement of understanding      Vitals: (Last set prior to Anesthesia Care Transfer)    CRNA VITALS  10/1/2018 0820 - 10/1/2018 0852      10/1/2018             Resp Rate (set): 10                Electronically Signed By: ELEANOR CHAVIS CRNA  October 1, 2018  8:52 AM

## 2018-10-01 NOTE — OP NOTE
PROCEDURE NOTE    DATE OF SERVICE: 10/1/2018    SURGEON: Bahman Lazo MD    PRE-OP DIAGNOSIS:    Healthcare maintenance       POST-OP DIAGNOSIS:    Polyps at cecum, HF and mid TC    PROCEDURE:   Colonoscopy with hot biopsy          ANESTHESIA:  VESNA Acuña CRNA    INDICATION FOR THE PROCEDURE: The patient is a 67 year old female in need of Healthcare maintenance  . The patient has no other complaints  . After explaining the risks to include bleeding, perforation, potential inability toreach the cecum, the patient wished to proceed.    PROCEDURE:After adequate sedation, the patient was in the left lateral decubitus position.  Rectal exam was performed.  There was normal tone and no palpable masses , external tags present.  The colonoscope was introduced into the rectum and advanced to the cecum with Mild difficulty.  The patient's prep was good.  The terminal cecum was reached.  The cecum, ascending, transverse, descending and sigmoid colon was with small 0.5 cm flat polyps in cecum x 2 and diminutive polyps at HF and mid Tc that were hot biopsied and destroyed .  The scope was retroflexed in the rectum.  The rectum was unremarkable  .  The scope was straightened and removed.  The patient tolerated the procedure well.     ESTIMATED BLOOD LOSS: none     COMPLICATIONS:  None    TISSUE REMOVED:  Yes    RECOMMEND:        Follow-up pending pathology      Bahman Lazo MD FACS

## 2018-10-01 NOTE — H&P
History and Physical    CHIEF COMPLAINT / REASON FOR PROCEDURE:  Healthcare maintenance     PERTINENT HISTORY   Patient is a 67 year old female who presents today for colonoscopy for Healthcare maintenance .   Last colonoscopy .  Patient has no complaints.    Past Medical History:   Diagnosis Date     Asymptomatic menopausal state     approximately age 47     Past Surgical History:   Procedure Laterality Date     ANKLE SURGERY  2012    achilles tendon repair and heel spur removal      SECTION      x three     COLONOSCOPY      08,Next colonoscopy due in 2018.     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       Bleeding tendencies:  No    ALLERGIES/SENSITIVITIES: No Known Allergies     CURRENT MEDICATIONS:    Current Facility-Administered Medications   Medication     lactated ringers infusion     lidocaine (LMX4) cream     lidocaine 1 % 1 mL     ondansetron (ZOFRAN) injection 4 mg     sodium chloride (PF) 0.9% PF flush 3 mL     sodium chloride (PF) 0.9% PF flush 3 mL       Physical Exam:  /88  Pulse 69  Temp 97.4  F (36.3  C) (Temporal)  Resp 16  Wt 74.8 kg (165 lb)  SpO2 99%  Breastfeeding? No  BMI 27.04 kg/m2  EXAM:  Chest/Respiratory Exam: Normal - Clear to auscultation without rales, rhonchi, or wheezing.  Cardiovascular Exam: normal        PLAN: COLONOSCOPY .  Patient understands risks of bleeding, perforation, potential inability to reach cecum, aspiration and wishes to proceed.

## 2018-10-03 PROBLEM — Z86.0101 H/O ADENOMATOUS POLYP OF COLON: Status: ACTIVE | Noted: 2018-10-01

## 2018-10-06 ENCOUNTER — HOSPITAL ENCOUNTER (OUTPATIENT)
Facility: OTHER | Age: 68
Setting detail: OBSERVATION
Discharge: HOME | End: 2018-10-06
Attending: STUDENT IN AN ORGANIZED HEALTH CARE EDUCATION/TRAINING PROGRAM | Admitting: INTERNAL MEDICINE
Payer: MEDICARE

## 2018-10-06 VITALS
HEART RATE: 55 BPM | SYSTOLIC BLOOD PRESSURE: 131 MMHG | WEIGHT: 163.14 LBS | DIASTOLIC BLOOD PRESSURE: 67 MMHG | TEMPERATURE: 98 F | RESPIRATION RATE: 16 BRPM | BODY MASS INDEX: 27.18 KG/M2 | HEIGHT: 65 IN | OXYGEN SATURATION: 98 %

## 2018-10-06 DIAGNOSIS — K92.2 GI BLEED: ICD-10-CM

## 2018-10-06 LAB
ABO + RH BLD: NORMAL
ABO + RH BLD: NORMAL
ALBUMIN SERPL-MCNC: 3.7 G/DL (ref 3.5–5.7)
ALP SERPL-CCNC: 56 U/L (ref 34–104)
ALT SERPL W P-5'-P-CCNC: 11 U/L (ref 7–52)
ANION GAP SERPL CALCULATED.3IONS-SCNC: 6 MMOL/L (ref 3–14)
AST SERPL W P-5'-P-CCNC: 12 U/L (ref 13–39)
BASOPHILS # BLD AUTO: 0.1 10E9/L (ref 0–0.2)
BASOPHILS NFR BLD AUTO: 0.6 %
BILIRUB SERPL-MCNC: 0.3 MG/DL (ref 0.3–1)
BLD GP AB SCN SERPL QL: NORMAL
BLOOD BANK CMNT PATIENT-IMP: NORMAL
BUN SERPL-MCNC: 18 MG/DL (ref 7–25)
CALCIUM SERPL-MCNC: 9 MG/DL (ref 8.6–10.3)
CHLORIDE SERPL-SCNC: 108 MMOL/L (ref 98–107)
CO2 SERPL-SCNC: 26 MMOL/L (ref 21–31)
CREAT SERPL-MCNC: 0.92 MG/DL (ref 0.6–1.2)
DIFFERENTIAL METHOD BLD: NORMAL
EOSINOPHIL # BLD AUTO: 0.1 10E9/L (ref 0–0.7)
EOSINOPHIL NFR BLD AUTO: 1.7 %
ERYTHROCYTE [DISTWIDTH] IN BLOOD BY AUTOMATED COUNT: 14 % (ref 10–15)
GFR SERPL CREATININE-BSD FRML MDRD: 61 ML/MIN/1.7M2
GLUCOSE SERPL-MCNC: 138 MG/DL (ref 70–105)
HCT VFR BLD AUTO: 39.8 % (ref 35–47)
HGB BLD-MCNC: 13.1 G/DL (ref 11.7–15.7)
IMM GRANULOCYTES # BLD: 0.1 10E9/L (ref 0–0.4)
IMM GRANULOCYTES NFR BLD: 0.7 %
LYMPHOCYTES # BLD AUTO: 2.7 10E9/L (ref 0.8–5.3)
LYMPHOCYTES NFR BLD AUTO: 33.3 %
MCH RBC QN AUTO: 28.5 PG (ref 26.5–33)
MCHC RBC AUTO-ENTMCNC: 32.9 G/DL (ref 31.5–36.5)
MCV RBC AUTO: 87 FL (ref 78–100)
MONOCYTES # BLD AUTO: 0.5 10E9/L (ref 0–1.3)
MONOCYTES NFR BLD AUTO: 6.5 %
NEUTROPHILS # BLD AUTO: 4.7 10E9/L (ref 1.6–8.3)
NEUTROPHILS NFR BLD AUTO: 57.2 %
PLATELET # BLD AUTO: 193 10E9/L (ref 150–450)
POTASSIUM SERPL-SCNC: 4 MMOL/L (ref 3.5–5.1)
PROT SERPL-MCNC: 5.7 G/DL (ref 6.4–8.9)
RBC # BLD AUTO: 4.59 10E12/L (ref 3.8–5.2)
SODIUM SERPL-SCNC: 140 MMOL/L (ref 134–144)
SPECIMEN EXP DATE BLD: NORMAL
WBC # BLD AUTO: 8.2 10E9/L (ref 4–11)

## 2018-10-06 PROCEDURE — 86900 BLOOD TYPING SEROLOGIC ABO: CPT | Performed by: STUDENT IN AN ORGANIZED HEALTH CARE EDUCATION/TRAINING PROGRAM

## 2018-10-06 PROCEDURE — G0008 ADMIN INFLUENZA VIRUS VAC: HCPCS

## 2018-10-06 PROCEDURE — 99213 OFFICE O/P EST LOW 20 MIN: CPT | Mod: 25 | Performed by: SURGERY

## 2018-10-06 PROCEDURE — 99219 ZZC INITIAL OBSERVATION CARE,LEVL II: CPT | Performed by: INTERNAL MEDICINE

## 2018-10-06 PROCEDURE — 25000128 H RX IP 250 OP 636: Performed by: INTERNAL MEDICINE

## 2018-10-06 PROCEDURE — G0378 HOSPITAL OBSERVATION PER HR: HCPCS

## 2018-10-06 PROCEDURE — 90662 IIV NO PRSV INCREASED AG IM: CPT | Performed by: INTERNAL MEDICINE

## 2018-10-06 PROCEDURE — 99285 EMERGENCY DEPT VISIT HI MDM: CPT | Mod: Z6 | Performed by: STUDENT IN AN ORGANIZED HEALTH CARE EDUCATION/TRAINING PROGRAM

## 2018-10-06 PROCEDURE — 25000128 H RX IP 250 OP 636: Performed by: STUDENT IN AN ORGANIZED HEALTH CARE EDUCATION/TRAINING PROGRAM

## 2018-10-06 PROCEDURE — 96374 THER/PROPH/DIAG INJ IV PUSH: CPT | Performed by: STUDENT IN AN ORGANIZED HEALTH CARE EDUCATION/TRAINING PROGRAM

## 2018-10-06 PROCEDURE — 86901 BLOOD TYPING SEROLOGIC RH(D): CPT | Performed by: STUDENT IN AN ORGANIZED HEALTH CARE EDUCATION/TRAINING PROGRAM

## 2018-10-06 PROCEDURE — 86850 RBC ANTIBODY SCREEN: CPT | Performed by: STUDENT IN AN ORGANIZED HEALTH CARE EDUCATION/TRAINING PROGRAM

## 2018-10-06 PROCEDURE — 96361 HYDRATE IV INFUSION ADD-ON: CPT | Performed by: STUDENT IN AN ORGANIZED HEALTH CARE EDUCATION/TRAINING PROGRAM

## 2018-10-06 PROCEDURE — C9113 INJ PANTOPRAZOLE SODIUM, VIA: HCPCS | Performed by: STUDENT IN AN ORGANIZED HEALTH CARE EDUCATION/TRAINING PROGRAM

## 2018-10-06 PROCEDURE — 36415 COLL VENOUS BLD VENIPUNCTURE: CPT | Performed by: STUDENT IN AN ORGANIZED HEALTH CARE EDUCATION/TRAINING PROGRAM

## 2018-10-06 PROCEDURE — 99285 EMERGENCY DEPT VISIT HI MDM: CPT | Mod: 25 | Performed by: STUDENT IN AN ORGANIZED HEALTH CARE EDUCATION/TRAINING PROGRAM

## 2018-10-06 PROCEDURE — 80053 COMPREHEN METABOLIC PANEL: CPT | Performed by: STUDENT IN AN ORGANIZED HEALTH CARE EDUCATION/TRAINING PROGRAM

## 2018-10-06 PROCEDURE — 85025 COMPLETE CBC W/AUTO DIFF WBC: CPT | Performed by: STUDENT IN AN ORGANIZED HEALTH CARE EDUCATION/TRAINING PROGRAM

## 2018-10-06 RX ORDER — SODIUM CHLORIDE 9 MG/ML
1000 INJECTION, SOLUTION INTRAVENOUS CONTINUOUS
Status: DISCONTINUED | OUTPATIENT
Start: 2018-10-06 | End: 2018-10-06

## 2018-10-06 RX ORDER — SODIUM CHLORIDE 9 MG/ML
INJECTION, SOLUTION INTRAVENOUS CONTINUOUS
Status: DISCONTINUED | OUTPATIENT
Start: 2018-10-06 | End: 2018-10-06 | Stop reason: HOSPADM

## 2018-10-06 RX ORDER — NALOXONE HYDROCHLORIDE 0.4 MG/ML
.1-.4 INJECTION, SOLUTION INTRAMUSCULAR; INTRAVENOUS; SUBCUTANEOUS
Status: DISCONTINUED | OUTPATIENT
Start: 2018-10-06 | End: 2018-10-06 | Stop reason: HOSPADM

## 2018-10-06 RX ADMIN — INFLUENZA A VIRUS A/MICHIGAN/45/2015 X-275 (H1N1) ANTIGEN (FORMALDEHYDE INACTIVATED), INFLUENZA A VIRUS A/SINGAPORE/INFIMH-16-0019/2016 IVR-186 (H3N2) ANTIGEN (FORMALDEHYDE INACTIVATED), AND INFLUENZA B VIRUS B/MARYLAND/15/2016 BX-69A (A B/COLORADO/6/2017-LIKE VIRUS) ANTIGEN (FORMALDEHYDE INACTIVATED) 0.5 ML: 60; 60; 60 INJECTION, SUSPENSION INTRAMUSCULAR at 12:14

## 2018-10-06 RX ADMIN — SODIUM CHLORIDE 1000 ML: 900 INJECTION, SOLUTION INTRAVENOUS at 10:41

## 2018-10-06 RX ADMIN — PANTOPRAZOLE SODIUM 40 MG: 40 INJECTION, POWDER, FOR SOLUTION INTRAVENOUS at 04:55

## 2018-10-06 RX ADMIN — SODIUM CHLORIDE: 900 INJECTION, SOLUTION INTRAVENOUS at 06:56

## 2018-10-06 RX ADMIN — SODIUM CHLORIDE 1000 ML: 900 INJECTION, SOLUTION INTRAVENOUS at 05:00

## 2018-10-06 ASSESSMENT — ENCOUNTER SYMPTOMS
DIZZINESS: 1
FEVER: 0
WEAKNESS: 1
NAUSEA: 0
CHILLS: 0
RECTAL PAIN: 0
VOMITING: 0
SHORTNESS OF BREATH: 0
BLOOD IN STOOL: 1
ABDOMINAL PAIN: 0

## 2018-10-06 NOTE — PROGRESS NOTES
"NS ADMISSION NOTE    Patient admitted to room 314 at approximately 0615 via wheel chair from emergency room. Patient was accompanied by spouse and transport tech.     Verbal SBAR report received from QASIM Arellano prior to patient arrival.     Patient ambulated to bed with one assist. Patient alert and oriented X 4. The patient is not having any pain. 0-10 Pain Scale: 4. Admission vital signs: Blood pressure 122/54, pulse 55, temperature 97.4  F (36.3  C), temperature source Tympanic, resp. rate 16, height 1.651 m (5' 5\"), weight 74 kg (163 lb 2.3 oz), SpO2 99 %, not currently breastfeeding. Patient and spouse were oriented to plan of care, call light, bed controls, tv, telephone, bathroom and visiting hours.     Risk Assessment    The following safety risks were identified during admission: fall. Yellow risk band applied: YES.     Skin Initial Assessment    This writer admitted this patient and completed a full skin assessment and Kory score in the Adult PCS flowsheet. Appropriate interventions initiated as needed.     Consuelo Estrella    "

## 2018-10-06 NOTE — ED PROVIDER NOTES
History     Chief Complaint   Patient presents with     Rectal Bleeding     Fall     HPI  Libby Elliott is a 67 year old female presents to the emergency room alongside her  with rectal bleeding.  Patient reports that she woke up last night and went to use the bathroom she remembers sitting down to use the bathroom and felt dizzy but did not did not remember what happened thereafter.  Her  states that he had a fall and ran to the bathroom and so patient on the floor with's bright red blood on the floor as well.  Patient was out for about a minutes and when she woke up she acted a little bit confused.  She recently had a colonoscopy done 6 days ago and had multiple polyps removed.  Patient denies any abdominal pain but reports dizziness.  Since after the colonoscopy she has never had bloody stool.  She does not take any blood thinners as well as aspirin or NSAIDs.  She had a glass of bob last night and drinks occasionally.  She does not smoke.  She denies palpitation or feeling of her heart racing.  She does not have any known cardiac disease.  She feels weak and dizzy.    Problem List:    Patient Active Problem List    Diagnosis Date Noted     H/O adenomatous polyp of colon 10/01/2018     Priority: Medium     Healthcare maintenance 2018     Priority: Medium     Obesity 2018     Priority: Medium        Past Medical History:    Past Medical History:   Diagnosis Date     Asymptomatic menopausal state        Past Surgical History:    Past Surgical History:   Procedure Laterality Date     ANKLE SURGERY      achilles tendon repair and heel spur removal      SECTION      x three     COLONOSCOPY      08,Next colonoscopy due in 2018.     COLONOSCOPY N/A 10/1/2018    F/U  adenomas tubular and serrated     DILATION AND CURETTAGE      x two     OTHER SURGICAL HISTORY      2009,88072.0,SKIN/SUBCUTANEOUS SURGERY,Excision of squamous cell carcinoma of the right  "pre-auricular area.       Family History:    Family History   Problem Relation Age of Onset     Breast Cancer Mother      Cancer-breast,living in her 80s     Cancer Mother      Cancer,kidney cancer     HEART DISEASE Father      Cancer Sister      Cancer,lung cancer, was a smoker     Cancer Sister      Cancer,lung CA     Other - See Comments Sister      hysterectomy     Family History Negative Sister      Good Health     Family History Negative Brother      Good Health     Family History Negative Brother      Good Health     Family History Negative Daughter      Good Health,02/28/80.     Family History Negative Daughter      Good Health,01/24/84.     Family History Negative Son      Good Health,03/21/72.     Family History Negative Other      Good Health,date of birth 12/04[birth year]51.     Thyroid Disease Maternal Grandmother        Social History:  Marital Status:   [2]  Social History   Substance Use Topics     Smoking status: Never Smoker     Smokeless tobacco: Never Used     Alcohol use Yes      Comment: very seldom         Medications:      No current outpatient prescriptions on file.      Review of Systems   Constitutional: Negative for chills and fever.   Respiratory: Negative for shortness of breath.    Cardiovascular: Negative for chest pain.   Gastrointestinal: Positive for blood in stool. Negative for abdominal pain, nausea, rectal pain and vomiting.   Neurological: Positive for dizziness and weakness.       Physical Exam   BP: 117/64  Pulse: 55  Temp: 96.3  F (35.7  C)  Resp: 16  Height: 165.1 cm (5' 5\")  Weight: 74.8 kg (165 lb)  SpO2: 100 %      Physical Exam   Constitutional: She is oriented to person, place, and time. She appears well-developed and well-nourished. No distress.   HENT:   Head: Normocephalic and atraumatic.   Mild bruising of the right forehead.   Eyes: Pupils are equal, round, and reactive to light.   Neck: Normal range of motion.   Cardiovascular: Normal rate, regular rhythm and normal " heart sounds.    Pulmonary/Chest: Effort normal and breath sounds normal.   Abdominal: Soft. Bowel sounds are normal. She exhibits no distension. There is no tenderness.   Genitourinary:   Genitourinary Comments: Perianal stained with dark red blood.  Normal rectal tone.  No palpable hemorrhoids.  Examination finger filled with dark red blood and dark red blood exiting the rectum post digital exam.   Neurological: She is alert and oriented to person, place, and time.       ED Course     ED Course     Procedures    Critical Care time:  none  Results for orders placed or performed during the hospital encounter of 10/06/18 (from the past 24 hour(s))   CBC with platelets differential   Result Value Ref Range    WBC 8.2 4.0 - 11.0 10e9/L    RBC Count 4.59 3.8 - 5.2 10e12/L    Hemoglobin 13.1 11.7 - 15.7 g/dL    Hematocrit 39.8 35.0 - 47.0 %    MCV 87 78 - 100 fl    MCH 28.5 26.5 - 33.0 pg    MCHC 32.9 31.5 - 36.5 g/dL    RDW 14.0 10.0 - 15.0 %    Platelet Count 193 150 - 450 10e9/L    Diff Method Automated Method     % Neutrophils 57.2 %    % Lymphocytes 33.3 %    % Monocytes 6.5 %    % Eosinophils 1.7 %    % Basophils 0.6 %    % Immature Granulocytes 0.7 %    Absolute Neutrophil 4.7 1.6 - 8.3 10e9/L    Absolute Lymphocytes 2.7 0.8 - 5.3 10e9/L    Absolute Monocytes 0.5 0.0 - 1.3 10e9/L    Absolute Eosinophils 0.1 0.0 - 0.7 10e9/L    Absolute Basophils 0.1 0.0 - 0.2 10e9/L    Abs Immature Granulocytes 0.1 0 - 0.4 10e9/L   Comprehensive metabolic panel   Result Value Ref Range    Sodium 140 134 - 144 mmol/L    Potassium 4.0 3.5 - 5.1 mmol/L    Chloride 108 (H) 98 - 107 mmol/L    Carbon Dioxide 26 21 - 31 mmol/L    Anion Gap 6 3 - 14 mmol/L    Glucose 138 (H) 70 - 105 mg/dL    Urea Nitrogen 18 7 - 25 mg/dL    Creatinine 0.92 0.60 - 1.20 mg/dL    GFR Estimate 61 >60 mL/min/1.7m2    GFR Estimate If Black 74 >60 mL/min/1.7m2    Calcium 9.0 8.6 - 10.3 mg/dL    Bilirubin Total 0.3 0.3 - 1.0 mg/dL    Albumin 3.7 3.5 - 5.7 g/dL     Protein Total 5.7 (L) 6.4 - 8.9 g/dL    Alkaline Phosphatase 56 34 - 104 U/L    ALT 11 7 - 52 U/L    AST 12 (L) 13 - 39 U/L   ABO/Rh type and screen   Result Value Ref Range    ABO A     RH(D) Pos     Antibody Screen Neg     Test Valid Only At [facility] and [facility]        Specimen Expires 10/09/2018        Medications   naloxone (NARCAN) injection 0.1-0.4 mg (not administered)   sodium chloride 0.9% infusion ( Intravenous New Bag 10/6/18 5475)   influenza Vac Split High-Dose (FLUZONE) injection 0.5 mL (not administered)   0.9% sodium chloride BOLUS (1,000 mLs Intravenous New Bag 10/6/18 5756)   pantoprazole (PROTONIX) 40 mg IV push injection (40 mg Intravenous Given 10/6/18 1076)       Assessments & Plan (with Medical Decision Making)   Patient is a 67-year-old female who presents with syncope and rectal bleeding.  She had multiple colonic polyps removed 6 days ago.  She reports dizziness however she remains hemodynamically stable. Abdominal examination is benign.  However rectal exam reveals dark red blood still exiting the rectum.  Hemoglobin is stable at 13.1.    Plan:  -He will be admitted to observation unit for monitoring.  -Continue IV fluids normal saline at 125 cc.  -Patient to be placed on cardiac monitor.  - IV pantoprazole 40 mg stat.  -Spoke to the hospitalist, Dr. Najera who accepted patient should be admitted for observation.  -Monitor hemoglobin.  -Discussed the plan with the patient and she is agreeable to be admitted.    I have reviewed the nursing notes.    I have reviewed the findings, diagnosis, plan and need for follow up with the patient.    There are no discharge medications for this patient.      Final diagnoses:   GI bleed     Torsten Saenz MD  Emergency Medicine Physician  10/6/2018  8:28 AM      10/6/2018   [facility]     Torsten Saenz MD  10/06/18 0844

## 2018-10-06 NOTE — IP AVS SNAPSHOT
[facility] [place] [facility]    [address]    [place] [address]    Phone:  [phone]    Fax:  [phone]                                       After Visit Summary   10/6/2018    Libby Elliott    MRN: [id number]           After Visit Summary Signature Page     I have received my discharge instructions, and my questions have been answered. I have discussed any challenges I see with this plan with the nurse or doctor.    ..........................................................................................................................................  Patient/Patient Representative Signature      ..........................................................................................................................................  Patient Representative Print Name and Relationship to Patient    ..................................................               ................................................  Date                                   Time    ..........................................................................................................................................  Reviewed by Signature/Title    ...................................................              ..............................................  Date                                               Time          22EPIC Rev 08/18

## 2018-10-06 NOTE — PHARMACY - DISCHARGE MEDICATION RECONCILIATION AND EDUCATION
Pharmacy:  Discharge Counseling and Medication Reconciliation    Libby THAKUR Earl  [address]  [place] [address] [phone] (home)   67 year old female  PCP: [facility]    Allergies: Review of patient's allergies indicates no known allergies.    Discharge Counseling:    Pharmacist met with patient (and/or family) today to review the medication portion of the After Visit Summary (with an emphasis on NEW medications) and to address patient's questions/concerns.    Summary of Education: no new medications and patient had no questions    Materials Provided:  MedCounselor sheets printed from Clinical Pharmacology on: n/a    Discharge Medication Reconciliation:    Molina Quijano RP has reviewed the patient's discharge medication orders and has compared them to the inpatient medication administration record and to what the patient was taking prior to admission - any discrepancies have been resolved.    It has been determined that the patient has an adequate supply of medications available or which can be obtained from the patient's preferred pharmacy, which HE/SHE has confirmed as: n/a     Thank you for the consult.    Molina Quijano RPH........October 6, 2018 11:21 AM

## 2018-10-06 NOTE — CONSULTS
GENERAL SURGERY CONSULTATION NOTE    Libby Elliott   [address]  [place] [address]  67 year old  female  Admission Date/Time: 10/6/2018  3:48 AM  Primary Care Provider:  [facility]    I was asked to see this patient by Dr. Najera for evaluation of GI bleeding s/p colonoscopy.     HPI: Libby Elliott is a 67 year old female with bleeding per rectum and dizziness while having a BM last night. Small clot this Am with no symptoms. HGB stable. Colonoscopy with hot biopsy polypectomy on 10/1. No blood per rectum prior to 10/5    REVIEW OF SYSTEMS:    GENERAL: No fevers or chills. Denies fatigue, recent weight loss.  HEENT: No sinus drainage. No changes with vision or hearing. No difficulty swallowing.   LYMPHATICS:  Noswollen nodes in axilla, neck or groin.  CARDIOVASCULAR: Denies chest pain, palpitations and dyspnea on exertion.  PULMONARY: No shortness of breath or cough. No increase in sputum production.  GI: See HPI No hematemesis. No constipation or diarrhea.  : No dysuria or hematuria.  SKIN: No recent rashes or ulcers.   HEMATOLOGY:  No history of easy bruising or bleeding.  ENDOCRINE:  Nohistory of diabetes or thyroid problems.  NEUROLOGY:  No history of seizures or headaches. No motor or sensory changes.        Patient Active Problem List   Diagnosis     Obesity     Healthcare maintenance     H/O adenomatous polyp of colon     GI bleed       Past Medical History:   Diagnosis Date     Asymptomatic menopausal state     approximately age 47       Past Surgical History:   Procedure Laterality Date     ANKLE SURGERY      achilles tendon repair and heel spur removal      SECTION      x three     COLONOSCOPY      08,Next colonoscopy due in 2018.     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       Family History   Problem Relation Age of Onset     Breast Cancer Mother      Cancer-breast,living in her 80s     Cancer Mother      Cancer,kidney cancer     HEART DISEASE Father      Cancer Sister      Cancer,lung cancer, was a smoker     Cancer Sister      Cancer,lung CA     Other - See Comments Sister      hysterectomy     Family History Negative Sister      Good Health     Family History Negative Brother      Good Health     Family History Negative Brother      Good Health     Family History Negative Daughter      Good Health,02/28/80.     Family History Negative Daughter      Good Health,01/24/84.     Family History Negative Son      Good Health,03/21/72.     Family History Negative Other      Good Health,date of birth [birth year].     Thyroid Disease Maternal Grandmother        Social History     Social History Narrative    Marcela Child; 02/28/80.    Citlali Child; 01/24/84.    Dexter Child; 03/21/72.     Ronald Spouse; date of birth [birth year]/51.       Social History     Social History     Marital status:      Spouse name: N/A     Number of children: N/A     Years of education: N/A     Occupational History     Not on file.     Social History Main Topics     Smoking status: Never Smoker     Smokeless tobacco: Never Used     Alcohol use Yes      Comment: very seldom      Drug use: No     Sexual activity: Not on file     Other Topics Concern     Not on file     Social History Narrative    Marcela Child; 02/28/80.    Citlali Child; 01/24/84.    Dexter Child; 03/21/72.     Ronald Spouse; date of birth [birth year].         No current facility-administered medications on file prior to encounter.   No current outpatient prescriptions on file prior to encounter.      ALLERGIES/SENSITIVITIES: No Known Allergies    PHYSICAL EXAM:     /67  Pulse 55  Temp 98  F (36.7  C) (Tympanic)  Resp 16  Ht 5' 5\" (1.651 m)  Wt 163 lb 2.3 oz (74 kg)  SpO2 98%  Breastfeeding? No  BMI " 27.15 kg/m2    General Appearance:  Appears well  Heart & CV:  RRR no murmur.  Intact distal pulses, good cap refill.  LUNGS:  CTA B/L, no wheezing or crackles.  Abd:  Non-tedner  Ext: good cap refill       ADDITIONAL COMMENTS:      CONSULTATION ASSESSMENT AND PLAN:    67 year old female with post polypectomy bleeding. Time frame is appropriate for hot biopsy and should be self limited. Chronic dehydration as well. Vasovagal near syncope yesterday.     - Observe bleeding at home  - Return to ER if hemorrhage develops.      Lance Pearson MD on 10/6/2018 at 12:01 PM

## 2018-10-06 NOTE — DISCHARGE SUMMARY
[facility]  H&P and Discharge Summary  Hospitalist    Date of Admission:  10/6/2018  Date of Discharge:  10/6/2018  Discharging Provider: Marty Najera  Date of Service (when I saw the patient): 10/06/18    Discharge Diagnoses   Active Problems:    GI bleed (10/6/2018)    Vasovagal syncope      History of Present Illness   Libby Elliott is an 67 year old female who presented with GI bleed.  Overnight she woke up and felt the need to defecate.  She went to the bathroom.  Next thing she knows she's awake on the floor.   says she passed out, and there was red stool all around her.  She had a colonoscopy on Monday with polyps removed.  No further red stool since admission.    Hospital Course   No bloody diarrhea throughout observation.  Had 2 small clots passed.  No abdominal pain.  Tolerating regular diet.  Dr. Pearson consulted.  Likely post-biopsy bleeding, with a vagal episode.  Orthostatic, corrected with IVF.  Recommend increasing oral fluids and follow-up with PCP within 14 days.      Signed, Marty Najera MD  Internal Medicine & Pediatrics  Pager: [phone]      Significant Results and Procedures   See below    Pending Results   These results will be followed up by na  Unresulted Labs Ordered in the Past 30 Days of this Admission     No orders found from 8/7/2018 to 10/7/2018.          Code Status   Full Code       Primary Care Physician   [facility]    Physical Exam   Temp: 98  F (36.7  C) Temp src: Tympanic BP: 131/67 Pulse: 55 Heart Rate: 60 Resp: 16 SpO2: 98 % O2 Device: None (Room air)    Vitals:    10/06/18 0343 10/06/18 0619   Weight: 74.8 kg (165 lb) 74 kg (163 lb 2.3 oz)     Vital Signs with Ranges  Temp:  [96.3  F (35.7  C)-98  F (36.7  C)] 98  F (36.7  C)  Pulse:  [55] 55  Heart Rate:  [54-60] 60  Resp:  [16] 16  BP: (107-131)/(54-70) 131/67  SpO2:  [98 %-100 %] 98 %  I/O last 3 completed shifts:  In: 1000 [IV Piggyback:1000]  Out: -     Gen:  Alert, NAD.  HEENT: MMM  Neck: Supple  CV: RRR no m/r/g  Pulm: CTAB, no w/r/r. No increased work of breathing  Msk: No LE edema  Abd: Soft  Neuro: Grossly intact  Skin: No concerning lesions.  Psychiatric: Normal affect and insight. Does not appear anxious or depressed.        Discharge Disposition   Discharged to home  Condition at discharge: Stable    Consultations This Hospital Stay   None    Time Spent on this Encounter   I, Marty Najera, personally saw the patient today and spent less than or equal to 30 minutes discharging this patient.    Discharge Orders     Reason for your hospital stay   Syncope and GI bleed     Follow-up and recommended labs and tests    With PCP within 14 days     Activity   Your activity upon discharge: activity as tolerated     Full Code     Diet   Follow this diet upon discharge: advance as tolerated       Discharge Medications   There are no discharge medications for this patient.    Allergies   No Known Allergies  Data   Most Recent 3 CBC's:  Recent Labs   Lab Test  10/06/18   0437  08/27/18   1004   WBC  8.2  7.1   HGB  13.1  14.6   MCV  87  87   PLT  193  219      Most Recent 3 BMP's:  Recent Labs   Lab Test  10/06/18   0437  08/27/18   1004   NA  140  143   POTASSIUM  4.0  3.9   CHLORIDE  108*  108*   CO2  26  27   BUN  18  17   CR  0.92  0.90   ANIONGAP  6  8   ANITA  9.0  9.7   GLC  138*  100     Most Recent 2 LFT's:  Recent Labs   Lab Test  10/06/18   0437  08/27/18   1004   AST  12*  16   ALT  11  14   ALKPHOS  56  71   BILITOTAL  0.3  0.6     Most Recent INR's and Anticoagulation Dosing History:  Anticoagulation Dose History     There is no flowsheet data to display.        Most Recent 3 Troponin's:No lab results found.  Most Recent Cholesterol Panel:  Recent Labs   Lab Test  08/27/18   1004   CHOL  243*   LDL  168*   HDL  53   TRIG  109     Most Recent 6 Bacteria Isolates From Any Culture (See EPIC Reports for Culture Details):No lab results found.  Most Recent TSH,  T4 and A1c Labs:No lab results found.  Results for orders placed or performed during the hospital encounter of 09/04/18   MA Screen Bilateral w/Lyn    Narrative    EXAM: MA SCREENING BILATERAL W/ LYN, 9/4/2018 9:45 AM    COMPARISONS: 12/1/2015, 12/30/2010    HISTORY: screen; Encounter for screening mammogram for breast cancer    BREAST DENSITY: Scattered fibroglandular densities.   There is no dominant mass, developing asymmetry, or suspicious  clusters of microcalcifications.      Impression    IMPRESSION: BI-RADS CATEGORY: 2 - Benign.    RECOMMENDED FOLLOW-UP: Annual Mammography.      NARCISO MORRIS MD

## 2018-10-06 NOTE — PROGRESS NOTES
[facility] [facility] DISCHARGE NOTE    Patient discharged to home at 12:33 PM via wheel chair. Accompanied by spouse and staff. Discharge instructions reviewed with patient, opportunity offered to ask questions. Prescriptions - None ordered for discharge. All belongings sent with patient.    Vilma Saldana

## 2018-10-06 NOTE — IP AVS SNAPSHOT
MRN:[id number]                      After Visit Summary   10/6/2018    Libby Elliott    MRN: [id number]           Thank you!     Thank you for choosing [facility] for your care. Our goal is always to provide you with excellent care. Hearing back from our patients is one way we can continue to improve our services. Please take a few minutes to complete the written survey that you may receive in the mail after you visit with us. Thank you!        Patient Information     Date Of Birth          1950        Designated Caregiver       Most Recent Value    Caregiver    Will someone help with your care after discharge? yes    Name of designated caregiver Gagan     Phone number of caregiver [phone]    Caregiver address NA      About your hospital stay     You were admitted on:  October 6, 2018 You last received care in the:  [facility]    You were discharged on:  October 6, 2018        Reason for your hospital stay       Syncope and GI bleed                  Who to Call     For medical emergencies, please call 911.  For non-urgent questions about your medical care, please call your primary care provider or clinic, [phone]          Attending Provider     Provider Specialty    Torsten Saenz MD Emergency Medicine    Reinaldo, Marty Luciano MD Pediatrics       Primary Care Provider Office Phone # Fax #    [facility] [phone]      After Care Instructions     Activity       Your activity upon discharge: activity as tolerated            Diet       Follow this diet upon discharge: advance as tolerated                  Follow-up Appointments     Follow-up and recommended labs and tests        With PCP within 14 days                  Your next 10 appointments already scheduled     Oct 18, 2018  1:15 PM CDT   Office Visit with Marty Najera MD   [facility] ([facility]    [address]  [place] [address]   [phone]           Bring a current list of meds and any records pertaining to this visit. For Physicals, please bring immunization records and any forms needing to be filled out. Please arrive 10 minutes early to complete paperwork.              Pending Results     No orders found from 10/4/2018 to 10/7/2018.            Statement of Approval     Ordered          10/06/18 1101  I have reviewed and agree with all the recommendations and orders detailed in this document.  EFFECTIVE NOW     Approved and electronically signed by:  Marty Najera MD             Admission Information     Date & Time Provider Department Dept. Phone    10/6/2018 Marty Najera MD [facility] [phone]      Your Vitals Were     Blood Pressure Pulse Temperature Respirations Height Weight    131/67 55 98  F (36.7  C) (Tympanic) 16 1.651 m (5' 5\") 74 kg (163 lb 2.3 oz)    Pulse Oximetry BMI (Body Mass Index)                98% 27.15 kg/m2          Care EveryWhere ID     This is your Care EveryWhere ID. This could be used by other organizations to access your [facility] medical records  [id number]        Equal Access to Services     MITCH CID : Hadii ginger majano [place], waaxda luqadaha, qaybta kaalmada aderika, brady reid . So [facility] [phone].    ATENCIÓN: Si habla español, tiene a flowers disposición servicios gratuitos de asistencia lingüística. Llame al [phone].    We comply with applicable federal civil rights laws and [place] laws. We do not discriminate on the basis of race, color, national origin, age, disability, sex, sexual orientation, or gender identity.               Review of your medicines      Notice     You have not been prescribed any medications.             Protect others around you: Learn how to safely use, store and throw away your medicines at www.disposemymeds.org.             Medication List: This " is a list of all your medications and when to take them. Check marks below indicate your daily home schedule. Keep this list as a reference.      Notice     You have not been prescribed any medications.

## 2018-10-06 NOTE — PHARMACY-ADMISSION MEDICATION HISTORY
Pharmacy -- Admission Medication Reconciliation    Prior to admission (PTA) medications were reviewed and the patient's PTA medication list was updated.    Sources Consulted: [employer], Patient interview    The reliability of this Medication Reconciliation is: Reliability: Reliable    The following significant changes were made:  None; patient takes no medications    In addition, the patient's allergies were reviewed with the patient and updated as follows:   Allergies: Review of patient's allergies indicates no known allergies.    The pharmacist has reviewed with the patient that all personal medications should be removed from the building or locked in the belongings safe.  Patient shall only take medications ordered by the physician and administered by the nursing staff.      Medication barriers identified: none    Medication adherence concerns: n/a    Understanding of emergency medications: n/a      Molian Quijano [facility], 10/6/2018,  11:13 AM

## 2018-10-06 NOTE — ED TRIAGE NOTES
"Pt here from home with complaint of fall at home with rectal bleeding.  Pt was found on floor by  in \"lots of blood\".  Pt had a colonoscopy on Monday.  Pt is pale in color.  May have hit her head with fall.  Able to stand and follow directions.   "

## 2018-10-06 NOTE — ED NOTES
Patient had a routine colonoscopy on Monday and had polyps removed.  , Gagan, heard her fall in bathroom and found patient on floor.  Patient recalls being slightly dizzy and then only remembers waking to her  yelling her name.  Gagan does state there was quite a bit of blood in the toilet when he found Libby.  She presents with an abrasion over her right eyebrow.

## 2018-10-18 ENCOUNTER — OFFICE VISIT (OUTPATIENT)
Dept: PEDIATRICS | Facility: OTHER | Age: 68
End: 2018-10-18
Attending: INTERNAL MEDICINE
Payer: MEDICARE

## 2018-10-18 VITALS
DIASTOLIC BLOOD PRESSURE: 68 MMHG | SYSTOLIC BLOOD PRESSURE: 112 MMHG | HEART RATE: 64 BPM | BODY MASS INDEX: 27.17 KG/M2 | WEIGHT: 163.3 LBS | TEMPERATURE: 97.5 F

## 2018-10-18 DIAGNOSIS — K92.2 GASTROINTESTINAL HEMORRHAGE, UNSPECIFIED GASTROINTESTINAL HEMORRHAGE TYPE: ICD-10-CM

## 2018-10-18 DIAGNOSIS — R55 VASOVAGAL SYNCOPE: ICD-10-CM

## 2018-10-18 DIAGNOSIS — Z09 HOSPITAL DISCHARGE FOLLOW-UP: Primary | ICD-10-CM

## 2018-10-18 DIAGNOSIS — Z23 NEED FOR VACCINATION: ICD-10-CM

## 2018-10-18 PROCEDURE — G0463 HOSPITAL OUTPT CLINIC VISIT: HCPCS | Mod: 25

## 2018-10-18 PROCEDURE — G0463 HOSPITAL OUTPT CLINIC VISIT: HCPCS

## 2018-10-18 PROCEDURE — 90732 PPSV23 VACC 2 YRS+ SUBQ/IM: CPT | Performed by: INTERNAL MEDICINE

## 2018-10-18 PROCEDURE — 99213 OFFICE O/P EST LOW 20 MIN: CPT | Performed by: INTERNAL MEDICINE

## 2018-10-18 PROCEDURE — G0009 ADMIN PNEUMOCOCCAL VACCINE: HCPCS

## 2018-10-18 ASSESSMENT — PAIN SCALES - GENERAL: PAINLEVEL: NO PAIN (0)

## 2018-10-18 NOTE — NURSING NOTE
"Chief Complaint   Patient presents with     RECHECK     gi bleed     Pt present to clinic today for a follow up on a GI bleed.  Initial /68 (BP Location: Right arm, Patient Position: Sitting, Cuff Size: Adult Regular)  Pulse 64  Temp 97.5  F (36.4  C) (Tympanic)  Wt 163 lb 4.8 oz (74.1 kg)  Breastfeeding? No  BMI 27.17 kg/m2 Estimated body mass index is 27.17 kg/(m^2) as calculated from the following:    Height as of 10/6/18: 5' 5\" (1.651 m).    Weight as of this encounter: 163 lb 4.8 oz (74.1 kg).  Medication Reconciliation: complete    Mady Hernandez LPN  "

## 2018-10-18 NOTE — PROGRESS NOTES
Subjective  Libby Elliott is a 67 year old female who presents for Hospital Discharge Follow-up.  She was recently admitted to [facility] after developing a GI bleed associated with syncope.  She had a little bit of dizziness at home for a few days which improved with oral intake of fluids.  She had some darker stools for a few days which also improved.  She feels like she is back to her baseline.    Allergies: reviewed in EMR  Medications: reviewed in EMR  Problem List/PMH: reviewed in EMR    Social Hx:  Social History   Substance Use Topics     Smoking status: Never Smoker     Smokeless tobacco: Never Used     Alcohol use Yes      Comment: very seldom      Social History     Social History Narrative    Marcela Child; 02/28/80.    Citlali Child; 01/24/84.    Dexter Child; 03/21/72.     Ronald Spouse; date of birth 12/04/51.     I reviewed social history and made relevant updates today.    Family Hx:   Family History   Problem Relation Age of Onset     Breast Cancer Mother      Cancer-breast,living in her 80s     Cancer Mother      Cancer,kidney cancer     HEART DISEASE Father      Cancer Sister      Cancer,lung cancer, was a smoker     Cancer Sister      Cancer,lung CA     Other - See Comments Sister      hysterectomy     Family History Negative Sister      Good Health     Family History Negative Brother      Good Health     Family History Negative Brother      Good Health     Family History Negative Daughter      Good Health,02/28/80.     Family History Negative Daughter      Good Health,01/24/84.     Family History Negative Son      Good Health,03/21/72.     Family History Negative Other      Good Health,date of birth [birth year]51.     Thyroid Disease Maternal Grandmother        Objective  Vitals: reviewed in EMR.  /68 (BP Location: Right arm, Patient Position: Sitting, Cuff Size: Adult Regular)  Pulse 64  Temp 97.5  F (36.4  C) (Tympanic)  Wt 163 lb 4.8 oz (74.1 kg)  Breastfeeding? No  BMI  27.17 kg/m2    Gen: Pleasant female, NAD.  HEENT: MMM  Neck: Supple  CV: RRR no m/r/g.   Pulm: clear  GI: Soft, NT, ND. No HSM. No masses. Bowel sounds present.  Neuro: Grossly intact  Skin: No concerning lesions.  Psychiatric: Normal affect and insight. Does not appear anxious or depressed.    Assessment    ICD-10-CM    1. Hospital discharge follow-up Z09    2. Need for vaccination Z23 Pneumococcal vaccine 23 valent PPSV23  (Pneumovax) [47215]   3. Gastrointestinal hemorrhage, unspecified gastrointestinal hemorrhage type K92.2    4. Vasovagal syncope R55        Ms. Elliott was recently hospitalized for GI bleed/syncope, appears to be doing well since discharge.  Discharge summary and recommendations for outpatient provider are reviewed. Based on what occurred in the visit today:  Previous medication(s) were discontinued or altered? No  Previous medication(s) were suspended pending consultation? No  New medication(s) started? No    Plan   -- Expected clinical course discussed   --Pneumovax today   --Follow-up as needed        Signed, Marty Najera MD  Internal Medicine & Pediatrics

## 2018-10-18 NOTE — MR AVS SNAPSHOT
After Visit Summary   10/18/2018    Libby Elliott    MRN: [id number]           Patient Information     Date Of Birth          1950        Visit Information        Provider Department      10/18/2018 1:15 PM Marty Najera MD [facility]        Today's Diagnoses     Hospital discharge follow-up    -  1    Need for vaccination        Gastrointestinal hemorrhage, unspecified gastrointestinal hemorrhage type        Vasovagal syncope           Follow-ups after your visit        Who to contact     If you have questions or need follow up information about today's clinic visit or your schedule please contact [facility] directly at [phone].  Normal or non-critical lab and imaging results will be communicated to you by MyChart, letter or phone within 4 business days after the clinic has received the results. If you do not hear from us within 7 days, please contact the clinic through MyChart or phone. If you have a critical or abnormal lab result, we will notify you by phone as soon as possible.  Submit refill requests through [employer] or call your pharmacy and they will forward the refill request to us. Please allow 3 business days for your refill to be completed.          Additional Information About Your Visit        Care EveryWhere ID     This is your Care EveryWhere ID. This could be used by other organizations to access your [facility] medical records  [id number]        Your Vitals Were     Pulse Temperature Breastfeeding? BMI (Body Mass Index)          64 97.5  F (36.4  C) (Tympanic) No 27.17 kg/m2         Blood Pressure from Last 3 Encounters:   10/18/18 112/68   10/06/18 131/67   10/01/18 109/57    Weight from Last 3 Encounters:   10/18/18 163 lb 4.8 oz (74.1 kg)   10/06/18 163 lb 2.3 oz (74 kg)   10/01/18 165 lb (74.8 kg)              We Performed the Following     Pneumococcal vaccine 23 valent PPSV23  (Pneumovax) [[id number]]        Primary Care  Provider Office Phone # Fax #    [facility] [phone] [address]  [facility] [address]        Equal Access to Services     JUN CID : [place], ana laura cortes, leonardonancy kanathangio farrisjeffgio, brady cristobal sirenadebbi langefrancaafia sosa. So [facility] [phone].    ATENCIÓN: Si habla español, tiene a flowers disposición servicios gratuitos de asistencia lingüística. Llame al [phone].    We comply with applicable federal civil rights laws and [place] laws. We do not discriminate on the basis of race, color, national origin, age, disability, sex, sexual orientation, or gender identity.            Thank you!     Thank you for choosing [facility]  for your care. Our goal is always to provide you with excellent care. Hearing back from our patients is one way we can continue to improve our services. Please take a few minutes to complete the written survey that you may receive in the mail after your visit with us. Thank you!             Your Updated Medication List - Protect others around you: Learn how to safely use, store and throw away your medicines at www.disposemymeds.org.      Notice  As of 10/18/2018  1:27 PM    You have not been prescribed any medications.

## 2019-06-17 ENCOUNTER — TELEPHONE (OUTPATIENT)
Dept: INTERNAL MEDICINE | Facility: OTHER | Age: 69
End: 2019-06-17

## 2019-06-17 ENCOUNTER — OFFICE VISIT (OUTPATIENT)
Dept: INTERNAL MEDICINE | Facility: OTHER | Age: 69
End: 2019-06-17
Attending: NURSE PRACTITIONER
Payer: MEDICARE

## 2019-06-17 ENCOUNTER — HOSPITAL ENCOUNTER (OUTPATIENT)
Dept: GENERAL RADIOLOGY | Facility: OTHER | Age: 69
Discharge: HOME | End: 2019-06-17
Attending: NURSE PRACTITIONER | Admitting: NURSE PRACTITIONER
Payer: MEDICARE

## 2019-06-17 ENCOUNTER — HOSPITAL ENCOUNTER (OUTPATIENT)
Dept: GENERAL RADIOLOGY | Facility: OTHER | Age: 69
End: 2019-06-17
Attending: NURSE PRACTITIONER
Payer: MEDICARE

## 2019-06-17 VITALS
OXYGEN SATURATION: 99 % | DIASTOLIC BLOOD PRESSURE: 72 MMHG | RESPIRATION RATE: 16 BRPM | TEMPERATURE: 97.6 F | HEART RATE: 72 BPM | BODY MASS INDEX: 26.78 KG/M2 | WEIGHT: 160.9 LBS | SYSTOLIC BLOOD PRESSURE: 122 MMHG

## 2019-06-17 DIAGNOSIS — Z87.19 HISTORY OF GI BLEED: ICD-10-CM

## 2019-06-17 DIAGNOSIS — M53.3 SACRAL PAIN: ICD-10-CM

## 2019-06-17 DIAGNOSIS — E53.8 VITAMIN B12 DEFICIENCY: ICD-10-CM

## 2019-06-17 DIAGNOSIS — M25.552 HIP PAIN, LEFT: ICD-10-CM

## 2019-06-17 DIAGNOSIS — D22.9 ATYPICAL MOLE: ICD-10-CM

## 2019-06-17 DIAGNOSIS — M16.12 PRIMARY OSTEOARTHRITIS OF LEFT HIP: ICD-10-CM

## 2019-06-17 DIAGNOSIS — R20.2 PARESTHESIA: Primary | ICD-10-CM

## 2019-06-17 LAB
ALBUMIN SERPL-MCNC: 4.3 G/DL (ref 3.5–5.7)
ALP SERPL-CCNC: 70 U/L (ref 34–104)
ALT SERPL W P-5'-P-CCNC: 16 U/L (ref 7–52)
ANION GAP SERPL CALCULATED.3IONS-SCNC: 6 MMOL/L (ref 3–14)
AST SERPL W P-5'-P-CCNC: 17 U/L (ref 13–39)
BILIRUB SERPL-MCNC: 0.5 MG/DL (ref 0.3–1)
BUN SERPL-MCNC: 22 MG/DL (ref 7–25)
CALCIUM SERPL-MCNC: 9.4 MG/DL (ref 8.6–10.3)
CHLORIDE SERPL-SCNC: 109 MMOL/L (ref 98–107)
CO2 SERPL-SCNC: 27 MMOL/L (ref 21–31)
CREAT SERPL-MCNC: 0.88 MG/DL (ref 0.6–1.2)
ERYTHROCYTE [DISTWIDTH] IN BLOOD BY AUTOMATED COUNT: 14 % (ref 10–15)
FOLATE SERPL-MCNC: 12.5 NG/ML
GFR SERPL CREATININE-BSD FRML MDRD: 64 ML/MIN/{1.73_M2}
GLUCOSE SERPL-MCNC: 104 MG/DL (ref 70–105)
HCT VFR BLD AUTO: 45.7 % (ref 35–47)
HGB BLD-MCNC: 14.7 G/DL (ref 11.7–15.7)
MAGNESIUM SERPL-MCNC: 2.5 MG/DL (ref 1.9–2.7)
MCH RBC QN AUTO: 28.3 PG (ref 26.5–33)
MCHC RBC AUTO-ENTMCNC: 32.2 G/DL (ref 31.5–36.5)
MCV RBC AUTO: 88 FL (ref 78–100)
PLATELET # BLD AUTO: 214 10E9/L (ref 150–450)
POTASSIUM SERPL-SCNC: 4.3 MMOL/L (ref 3.5–5.1)
PROT SERPL-MCNC: 6.7 G/DL (ref 6.4–8.9)
RBC # BLD AUTO: 5.2 10E12/L (ref 3.8–5.2)
SODIUM SERPL-SCNC: 142 MMOL/L (ref 134–144)
TSH SERPL DL<=0.05 MIU/L-ACNC: 3.02 IU/ML (ref 0.34–5.6)
VIT B12 SERPL-MCNC: 254 PG/ML (ref 180–914)
WBC # BLD AUTO: 6.1 10E9/L (ref 4–11)

## 2019-06-17 PROCEDURE — 80053 COMPREHEN METABOLIC PANEL: CPT | Mod: ZL | Performed by: NURSE PRACTITIONER

## 2019-06-17 PROCEDURE — 72100 X-RAY EXAM L-S SPINE 2/3 VWS: CPT

## 2019-06-17 PROCEDURE — 84443 ASSAY THYROID STIM HORMONE: CPT | Mod: ZL | Performed by: NURSE PRACTITIONER

## 2019-06-17 PROCEDURE — G0463 HOSPITAL OUTPT CLINIC VISIT: HCPCS | Mod: 25

## 2019-06-17 PROCEDURE — G0463 HOSPITAL OUTPT CLINIC VISIT: HCPCS

## 2019-06-17 PROCEDURE — 85027 COMPLETE CBC AUTOMATED: CPT | Mod: ZL | Performed by: NURSE PRACTITIONER

## 2019-06-17 PROCEDURE — 82607 VITAMIN B-12: CPT | Mod: ZL | Performed by: NURSE PRACTITIONER

## 2019-06-17 PROCEDURE — 83735 ASSAY OF MAGNESIUM: CPT | Mod: ZL | Performed by: NURSE PRACTITIONER

## 2019-06-17 PROCEDURE — 36415 COLL VENOUS BLD VENIPUNCTURE: CPT | Mod: ZL | Performed by: NURSE PRACTITIONER

## 2019-06-17 PROCEDURE — 82746 ASSAY OF FOLIC ACID SERUM: CPT | Mod: ZL | Performed by: NURSE PRACTITIONER

## 2019-06-17 PROCEDURE — 99214 OFFICE O/P EST MOD 30 MIN: CPT | Performed by: NURSE PRACTITIONER

## 2019-06-17 PROCEDURE — 73502 X-RAY EXAM HIP UNI 2-3 VIEWS: CPT

## 2019-06-17 RX ORDER — LANOLIN ALCOHOL/MO/W.PET/CERES
1000 CREAM (GRAM) TOPICAL DAILY
Start: 2019-06-17

## 2019-06-17 SDOH — HEALTH STABILITY: MENTAL HEALTH: HOW OFTEN DO YOU HAVE A DRINK CONTAINING ALCOHOL?: MONTHLY OR LESS

## 2019-06-17 ASSESSMENT — ENCOUNTER SYMPTOMS
CONSTIPATION: 0
FATIGUE: 0
DYSURIA: 0
PHOTOPHOBIA: 0
ANAL BLEEDING: 0
ADENOPATHY: 0
HEARTBURN: 0
HEMATURIA: 0
WEAKNESS: 1
ENDOCRINE NEGATIVE: 1
RECTAL PAIN: 0
HEMATOCHEZIA: 0
ACTIVITY CHANGE: 0
CHILLS: 0
COUGH: 0
TREMORS: 0
FEVER: 0
ARTHRALGIAS: 0
BACK PAIN: 1
NERVOUS/ANXIOUS: 0
SHORTNESS OF BREATH: 0
PARESTHESIAS: 1
ABDOMINAL PAIN: 0
UNEXPECTED WEIGHT CHANGE: 0
VOMITING: 0
WOUND: 0
SLEEP DISTURBANCE: 0
NUMBNESS: 1
DYSPHORIC MOOD: 0
DIAPHORESIS: 0
DIARRHEA: 0

## 2019-06-17 ASSESSMENT — PAIN SCALES - GENERAL: PAINLEVEL: MODERATE PAIN (4)

## 2019-06-17 NOTE — TELEPHONE ENCOUNTER
Called patient and verified last name and date of birth. She states she is returning a phone call. Not seeing any documentation on who called.    Mady Hernandez LPN on 6/17/2019 at 11:45 AM

## 2019-06-17 NOTE — NURSING NOTE
Chief Complaint   Patient presents with     Musculoskeletal Problem       Medication Reconciliation: complete  Yuliya Aguirre LPN  ..................6/17/2019   8:15 AM

## 2019-06-17 NOTE — PATIENT INSTRUCTIONS
Possible bursitis of left trochanter: Start aleve 1 tablet twice daily for 10 days with food.  Ice to the left hip 3 times daily for 10 minutes and rest.  Do not lie on that hip.  We will do other labs and xrays to evaluate your pain and parasthesias.  See me in 2 weeks to follow up on how you are progressing and to determine if other work-up is needed.

## 2019-06-17 NOTE — PROGRESS NOTES
Subjective:  She is here today for a few concerns that is been occurring for several months.  Paresthesias: This occurs in her lower legs along the front of her legs and can be anytime of the day usually when she is standing up.  If she sits down sometimes this gets better.  It can occur in both legs at the same time or one leg or the other.  Sometimes she will get this in her hands as well but usually during the night when she sleeping.  This is not as bothersome as the legs.  Sometimes her legs feel a little weak but not that they are going to give out.  This seems to come with the paresthesias.  If she sits down the weakness goes away.  Left hip pain: Occasionally she will have left hip pain if she lies on the left side.  Sometimes if she is walking or sitting she will have pain at the lateral hip and it does radiate down the lateral thigh.  This is off and on.  She has not been doing any hiking or particular exercises that cause this pain.  It does not seem to be associated with the paresthesias of her legs.  Sacral pain: For several months now she is also had pain at the sacrum.  This is usually worse with sitting.  She has not had any falls or injuries.  She did have a colonoscopy back in the fall 2018 with biopsies.  She had a lower GI bleed afterwards but had a normal hemoglobin.  She has not experienced any more rectal bleeding or black stools.  She does not have any pain that radiates into her buttock or down the back of her legs.  Atypical mole: Patient has a black mole on her nose that is been present for quite some time.  It is not changed.  Her  asked her to please have this checked.  She has no personal or family history of melanoma or other skin cancers.    Patient Active Problem List   Diagnosis     Obesity     Healthcare maintenance     H/O adenomatous polyp of colon     GI bleed     Past Medical History:   Diagnosis Date     Asymptomatic menopausal state     approximately age 47     Past  Surgical History:   Procedure Laterality Date     ANKLE SURGERY      achilles tendon repair and heel spur removal      SECTION      x three     COLONOSCOPY      08,Next colonoscopy due in 2018.     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     Social History     Socioeconomic History     Marital status:      Spouse name: Not on file     Number of children: Not on file     Years of education: Not on file     Highest education level: Not on file   Occupational History     Not on file   Social Needs     Financial resource strain: Not on file     Food insecurity:     Worry: Not on file     Inability: Not on file     Transportation needs:     Medical: Not on file     Non-medical: Not on file   Tobacco Use     Smoking status: Never Smoker     Smokeless tobacco: Never Used   Substance and Sexual Activity     Alcohol use: Yes     Frequency: Monthly or less     Drug use: No     Sexual activity: Not Currently   Lifestyle     Physical activity:     Days per week: Not on file     Minutes per session: Not on file     Stress: Not on file   Relationships     Social connections:     Talks on phone: Not on file     Gets together: Not on file     Attends Hinduism service: Not on file     Active member of club or organization: Not on file     Attends meetings of clubs or organizations: Not on file     Relationship status: Not on file     Intimate partner violence:     Fear of current or ex partner: Not on file     Emotionally abused: Not on file     Physically abused: Not on file     Forced sexual activity: Not on file   Other Topics Concern     Not on file   Social History Narrative    Marcela Child; 82.    Citlali Child; 86.    Dexter Child; 74.     Ronald Spouse; date of birth 53.      Family History   Problem Relation Age of Onset     Breast Cancer Mother         Cancer-breast,living in her 80s     Cancer Mother         Cancer,kidney cancer     Heart Disease Father      Cancer Sister         Cancer,lung cancer, was a smoker     Cancer Sister         Cancer,lung CA     Other - See Comments Sister         hysterectomy     Family History Negative Sister         Good Health     Family History Negative Brother         Good Health     Family History Negative Brother         Good Health     Family History Negative Daughter         Good Health,02/28/80.     Family History Negative Daughter         Good Health,01/24/84.     Family History Negative Son         Good Health,03/21/72.     Family History Negative Other         Good Health,date of birth [birth year].     Thyroid Disease Maternal Grandmother      No current outpatient medications on file.     Patient has no known allergies.      Review of Systems:  Review of Systems   Constitutional: Negative for activity change, chills, diaphoresis, fatigue, fever and unexpected weight change.   Eyes: Negative for photophobia and visual disturbance.   Respiratory: Negative for cough and shortness of breath.    Cardiovascular: Positive for peripheral edema. Negative for chest pain.   Gastrointestinal: Negative for abdominal pain, anal bleeding, constipation, diarrhea, heartburn, hematochezia, rectal pain and vomiting.   Endocrine: Negative.    Genitourinary: Negative for dysuria, hematuria and vaginal bleeding.   Musculoskeletal: Positive for back pain. Negative for arthralgias.   Skin: Negative for pallor and wound.   Allergic/Immunologic: Negative for immunocompromised state.   Neurological: Positive for weakness, numbness and paresthesias. Negative for tremors and syncope.   Hematological: Negative for adenopathy.   Psychiatric/Behavioral: Negative for dysphoric mood and sleep disturbance. The patient is not nervous/anxious.        Objective:   /72 (BP  Location: Right arm, Patient Position: Sitting, Cuff Size: Adult Regular)   Pulse 72   Temp 97.6  F (36.4  C) (Temporal)   Resp 16   Wt 73 kg (160 lb 14.4 oz)   SpO2 99%   BMI 26.78 kg/m    Physical Exam  Pleasant female in no acute distress.  Affect normal.  Alert and oriented x4.  Sclera nonicteric.  Conjunctiva noninflamed.  Mucous memories moist.  Throat without erythema.  Along with the nasal bridge there is a 1 mm flat black mole that is round and without irritation.  Neck supple without adenopathy.  No thyromegaly.  No thyroid tenderness.  Lung fields clear to auscultation throughout.  Cardiovascular regular rate and rhythm with no murmur clicks rubs or S3 auscultated.  Abdomen soft without masses, tenderness and organomegaly.  No inguinal adenopathy.  Extremities with trace bilateral edema.  She has engorged varicose veins.  DP PT intact.  Capillary refill less than 3 seconds.  There is mild pain with palpation over the left trochanter.  There is discomfort with palpation to the sacrum.  Gait is stable.  Normal strength sensation intact of upper and lower extremities.  Cranial nerves II-XII intact.  Romberg negative.  Finger-to-nose test appropriate.    Assessment:    ICD-10-CM    1. Paresthesia- legs R20.2 CBC with platelets     Comprehensive metabolic panel     Thyrotropin GH     Vitamin B12     Folate     Magnesium   2. Hip pain, left M25.552 XR Hip Left 2-3 Views   3. Sacral pain M53.3 XR Lumbar Spine 2/3 Views   4. History of GI bleed Z87.19 CBC with platelets   5. Atypical mole D22.9 GENERAL SURG ADULT REFERRAL       Plan:   Parasthesia of legs:  This seems to be one or both legs and is only along the front of her legs.  Sometimes legs feel weaker and full.  She does have venous insufficiency with engorged varicose veins which may be partially contributing to her symptoms.  We will work her up further by checking CBC, chemistry panel, TSH, folate, B12 and magnesium level.  Left hip pain: Suspect  she has a mild left trochanteric bursitis.  Recommend Aleve 1 tablet twice daily with food, discontinue with GI upset.  Ice to the affected area and rest.  Obtain x-ray of left hip.  Sacral pain: May use the Aleve.  Will obtain x-ray of lumbosacral spine.  History of GI bleed: Patient had mild GI bleed last fall after colonoscopy.  Will evaluate CBC to make sure this is not contributing to her paresthesias.  She had a normal hemoglobin last fall.  Atypical mole: She will be referred for surgeon for evaluation and possible removal.    We will plan to see her back in 2 weeks to follow-up on labs and x-rays and to see how she did with using the Aleve to help with the suspected trochanter bursitis.  May need further evaluation depending upon improvement of symptoms or findings.  Neurological examination today is intact.    THERESA Barry   6/17/2019  8:46 AM

## 2019-07-01 ENCOUNTER — OFFICE VISIT (OUTPATIENT)
Dept: INTERNAL MEDICINE | Facility: OTHER | Age: 69
End: 2019-07-01
Attending: NURSE PRACTITIONER
Payer: MEDICARE

## 2019-07-01 VITALS
HEIGHT: 66 IN | OXYGEN SATURATION: 98 % | DIASTOLIC BLOOD PRESSURE: 80 MMHG | TEMPERATURE: 97.6 F | RESPIRATION RATE: 16 BRPM | HEART RATE: 63 BPM | WEIGHT: 161.3 LBS | SYSTOLIC BLOOD PRESSURE: 120 MMHG | BODY MASS INDEX: 25.92 KG/M2

## 2019-07-01 DIAGNOSIS — M16.12 PRIMARY OSTEOARTHRITIS OF LEFT HIP: ICD-10-CM

## 2019-07-01 DIAGNOSIS — M70.62 TROCHANTERIC BURSITIS OF LEFT HIP: ICD-10-CM

## 2019-07-01 DIAGNOSIS — R20.2 PARESTHESIA: ICD-10-CM

## 2019-07-01 DIAGNOSIS — E53.8 VITAMIN B12 DEFICIENCY: Primary | ICD-10-CM

## 2019-07-01 PROCEDURE — G0463 HOSPITAL OUTPT CLINIC VISIT: HCPCS

## 2019-07-01 PROCEDURE — 99213 OFFICE O/P EST LOW 20 MIN: CPT | Performed by: NURSE PRACTITIONER

## 2019-07-01 ASSESSMENT — PAIN SCALES - GENERAL: PAINLEVEL: NO PAIN (0)

## 2019-07-01 ASSESSMENT — MIFFLIN-ST. JEOR: SCORE: 1293.78

## 2019-07-01 NOTE — PROGRESS NOTES
Subjective:  She is here today for follow-up on vitamin B12 deficiency and left hip pain.  She reports that since she started taking vitamin B12 that the paresthesias in the leg have significantly improved.  They are barely there any longer.  She noticed her more with standing and can now stand for longer periods of time without any problems.  She also had an x-ray which showed fairly advanced degenerative left hip disease.  It is suspected that she also has a trochanteric bursitis.  She has pain with lying on the affected side and has pain that starts at the lateral hip and radiates down to the mid thigh.  She has been doing NSAIDs but that has not helped much.  She does still do walking activity for exercise.  She finds there is more pain at the left hip with walking upstairs.  She has an appoint with orthopedic specialist on July 15.    Patient Active Problem List   Diagnosis     Obesity     Healthcare maintenance     H/O adenomatous polyp of colon     GI bleed     Vitamin B12 deficiency     Osteoarthritis of left hip     Past Medical History:   Diagnosis Date     Asymptomatic menopausal state     approximately age 47     Past Surgical History:   Procedure Laterality Date     ANKLE SURGERY      achilles tendon repair and heel spur removal      SECTION      x three     COLONOSCOPY      08,Next colonoscopy due in 2018.     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     Social History     Socioeconomic History     Marital status:      Spouse name: Not on file     Number of children: Not on file     Years of education: Not on file     Highest education level: Not on file   Occupational History     Not on file   Social Needs     Financial resource strain: Not on file     Food insecurity:      Worry: Not on file     Inability: Not on file     Transportation needs:     Medical: Not on file     Non-medical: Not on file   Tobacco Use     Smoking status: Never Smoker     Smokeless tobacco: Never Used   Substance and Sexual Activity     Alcohol use: Yes     Frequency: Monthly or less     Drug use: No     Sexual activity: Not Currently   Lifestyle     Physical activity:     Days per week: Not on file     Minutes per session: Not on file     Stress: Not on file   Relationships     Social connections:     Talks on phone: Not on file     Gets together: Not on file     Attends Mosque service: Not on file     Active member of club or organization: Not on file     Attends meetings of clubs or organizations: Not on file     Relationship status: Not on file     Intimate partner violence:     Fear of current or ex partner: Not on file     Emotionally abused: Not on file     Physically abused: Not on file     Forced sexual activity: Not on file   Other Topics Concern     Not on file   Social History Narrative    Marcela Child; [birth year].    Citlali Child; 01/24/84.    Dexter Child; 03/21/72.     Ronald Spouse; date of birth [birth year].     Family History   Problem Relation Age of Onset     Breast Cancer Mother         Cancer-breast,living in her 80s     Cancer Mother         Cancer,kidney cancer     Heart Disease Father      Cancer Sister         Cancer,lung cancer, was a smoker     Cancer Sister         Cancer,lung CA     Other - See Comments Sister         hysterectomy     Family History Negative Sister         Good Health     Family History Negative Brother         Good Health     Family History Negative Brother         Good Health     Family History Negative Daughter         Good Health,02/28/80.     Family History Negative Daughter         Good Health,01/24/84.     Family History Negative Son         Good Health,03/21/72.     Family History Negative Other         Good Health,date of birth [birth year].     Thyroid  "Disease Maternal Grandmother      Current Outpatient Medications   Medication Sig Dispense Refill     cyanocobalamin (VITAMIN B-12) 1000 MCG tablet Take 1 tablet (1,000 mcg) by mouth daily       Patient has no known allergies.      Review of Systems:  Review of Systems  See HPI    Objective:   /80 (BP Location: Right arm, Patient Position: Sitting, Cuff Size: Adult Regular)   Pulse 63   Temp 97.6  F (36.4  C) (Tympanic)   Resp 16   Ht 1.685 m (5' 6.34\")   Wt 73.2 kg (161 lb 4.8 oz)   SpO2 98%   BMI 25.77 kg/m    Physical Exam  Pleasant female in no acute distress.  Affect normal.  Alert and oriented x4.  Skin color pink.  Mucous memories moist.  Lung fields clear to auscultation.  Cardiovascular regular rate and rhythm.  Left hip has tenderness with palpation over the left trochanter.    Assessment:    ICD-10-CM    1. Vitamin B12 deficiency E53.8    2. Paresthesia- legs R20.2    3. Primary osteoarthritis of left hip M16.12    4. Trochanteric bursitis of left hip M70.62        Plan:   1.  Patient will continue with vitamin B12 1000 mcg daily and will plan to check vitamin B12 level in a couple of months.  She is already showing significant improvement with the paresthesias.  2.  She has fairly advanced arthritis of the left hip and suspected by presentation and examination the patient also has concurrent trochanteric bursitis.  She has an appoint with orthopedic specialist mid July.  She will keep this appointment.  Options likely will be a corticosteroid injection and possibly surgical intervention.  In the meantime she may continue with NSAIDs if she finds that they were helpful with the pain with activity otherwise if she did not find any benefit may discontinue the NSAIDs.    THERESA Barry   7/1/2019  8:39 AM  "

## 2019-07-10 ENCOUNTER — OFFICE VISIT (OUTPATIENT)
Dept: SURGERY | Facility: OTHER | Age: 69
End: 2019-07-10
Attending: NURSE PRACTITIONER
Payer: MEDICARE

## 2019-07-10 VITALS
RESPIRATION RATE: 20 BRPM | SYSTOLIC BLOOD PRESSURE: 132 MMHG | DIASTOLIC BLOOD PRESSURE: 86 MMHG | WEIGHT: 163 LBS | HEART RATE: 81 BPM | TEMPERATURE: 97.9 F | BODY MASS INDEX: 26.04 KG/M2

## 2019-07-10 DIAGNOSIS — D22.9 ATYPICAL MOLE: Primary | ICD-10-CM

## 2019-07-10 PROCEDURE — 11440 EXC FACE-MM B9+MARG 0.5 CM/<: CPT | Performed by: SURGERY

## 2019-07-10 PROCEDURE — 88305 TISSUE EXAM BY PATHOLOGIST: CPT

## 2019-07-10 PROCEDURE — G0463 HOSPITAL OUTPT CLINIC VISIT: HCPCS

## 2019-07-10 RX ORDER — CALCIUM POLYCARBOPHIL 625 MG
TABLET ORAL DAILY
COMMUNITY

## 2019-07-10 ASSESSMENT — PAIN SCALES - GENERAL: PAINLEVEL: NO PAIN (0)

## 2019-07-10 NOTE — PATIENT INSTRUCTIONS
Your incision was closed with stitches that will need to be removed.     Please make an appointment for 5-7 days for suture removal    It is ok to remove the dressing and get the incision wet in the shower on the day after your procedure.     Don't soak in a tub, pool or lake for 5 days.      If you have concerns, please call.

## 2019-07-10 NOTE — NURSING NOTE
"Chief Complaint   Patient presents with     Lesion Removal     from nose       Initial /86 (BP Location: Right arm, Patient Position: Sitting, Cuff Size: Adult Regular)   Pulse 81   Temp 97.9  F (36.6  C) (Temporal)   Resp 20   Wt 73.9 kg (163 lb)   Breastfeeding? No   BMI 26.04 kg/m   Estimated body mass index is 26.04 kg/m  as calculated from the following:    Height as of 7/1/19: 1.685 m (5' 6.34\").    Weight as of this encounter: 73.9 kg (163 lb).  Medication Reconciliation: complete  TIMEOUT  Universal Protocol    A. Pre-procedure verification complete: yes   1-relevant information / documentation available, reviewed and properly matched to the patient; 2-consent accurate and complete, 3-equipment and supplies available    B. Site marking complete: N/A  Site marked if not in continuous attendance with patient    C. TIME OUT completed:  Yes  Time Out was conducted just prior to starting procedure to verify the eight required elements: 1-patient identity, 2-consent accurate and complete, 3-position, 4-correct side/site marked (if applicable), 5-procedure, 6-relevant images / results properly labeled and displayed (if applicable), 7-antibiotics / irrigation fluids (if applicable), 8-safety precautions.    Amelie Sam LPN  "

## 2019-07-10 NOTE — PROGRESS NOTES
Procedure Note  Pre/Post Operative Diagnosis:   0.3 cm ( with margins ) pigmented skin lesion on bridge of nose    Procedure:    Excision    Surgeon: Bahman Lazo MD FACS    Local Anesthesia: 1% lidocaine with0.25%Marcaine with epinephrine    Indication for the procedure:    This is a 68 year old female patient with 2 mm pigmented lesion on bridge of nose.   After explaining the risks to include bleeding, infection, recurrence or need for re-excision,and scarring the patient wished to proceed.    Procedure:   The area was prepped and draped in usual sterile fashion with ChloraPrep . After, adequate local anesthesia,an elliptical skin incision was made to encompass the lesion.  The skin was closed with 5-0 Nylon suture.    Plan:  The patient will followup in one week for suture removal and to review the pathology. Patient will followup if there any problems with the wound including redness or drainage.

## 2019-07-15 ENCOUNTER — OFFICE VISIT (OUTPATIENT)
Dept: ORTHOPEDICS | Facility: OTHER | Age: 69
End: 2019-07-15
Attending: NURSE PRACTITIONER
Payer: MEDICARE

## 2019-07-15 DIAGNOSIS — M16.12 PRIMARY OSTEOARTHRITIS OF LEFT HIP: ICD-10-CM

## 2019-07-15 PROCEDURE — G0463 HOSPITAL OUTPT CLINIC VISIT: HCPCS

## 2019-07-17 ENCOUNTER — OFFICE VISIT (OUTPATIENT)
Dept: SURGERY | Facility: OTHER | Age: 69
End: 2019-07-17
Attending: SURGERY
Payer: MEDICARE

## 2019-07-17 VITALS
RESPIRATION RATE: 16 BRPM | HEART RATE: 77 BPM | TEMPERATURE: 97.3 F | SYSTOLIC BLOOD PRESSURE: 116 MMHG | DIASTOLIC BLOOD PRESSURE: 76 MMHG | BODY MASS INDEX: 25.94 KG/M2 | WEIGHT: 162.4 LBS

## 2019-07-17 DIAGNOSIS — D22.39 NEVUS OF NOSE: Primary | ICD-10-CM

## 2019-07-17 PROCEDURE — 99024 POSTOP FOLLOW-UP VISIT: CPT | Performed by: SURGERY

## 2019-07-17 PROCEDURE — G0463 HOSPITAL OUTPT CLINIC VISIT: HCPCS

## 2019-07-17 ASSESSMENT — PAIN SCALES - GENERAL: PAINLEVEL: NO PAIN (0)

## 2019-07-17 NOTE — PROGRESS NOTES
Patient presents for post surgical visit after nasal bride excision 2 weeks ago. Patient has done well. No problems with incision.    /76 (BP Location: Right arm, Patient Position: Sitting, Cuff Size: Adult Regular)   Pulse 77   Temp 97.3  F (36.3  C) (Temporal)   Resp 16   Wt 73.7 kg (162 lb 6.4 oz)   Breastfeeding? No   BMI 25.94 kg/m      General: NAD, pleasant and cooperative with exam and interview.  Nose: healing incision. No sign of infection. No pain with palpation.  Psychiatry: awake, alert and oriented. Appropriate affect.    Assessment/Plan:  Discussed surgery and pathology results. Patient can return to normal activities. Patient will call with questions or concerns.    Lance Pearson MD on 7/17/2019 at 9:51 AM

## 2019-07-17 NOTE — NURSING NOTE
"Chief Complaint   Patient presents with     Suture Removal     from nose       Initial /76 (BP Location: Right arm, Patient Position: Sitting, Cuff Size: Adult Regular)   Pulse 77   Temp 97.3  F (36.3  C) (Temporal)   Resp 16   Wt 73.7 kg (162 lb 6.4 oz)   Breastfeeding? No   BMI 25.94 kg/m   Estimated body mass index is 25.94 kg/m  as calculated from the following:    Height as of 7/1/19: 1.685 m (5' 6.34\").    Weight as of this encounter: 73.7 kg (162 lb 6.4 oz).  Medication Reconciliation: complete    Amelie Sam LPN  "

## 2019-07-22 NOTE — PROGRESS NOTES
VITALS:   Height:   66  Weight:   163  Pulse rate:  72  Blood Pressure:  122 / 72    CHIEF COMPLAINT: Left Hip Pain    PROBLEMS:   Patient has no noted problems.    PATIENT REPORTED MEDICATIONS:  B-12 TABLET (CYANOCOBALAMIN TABS)     PATIENT REPORTED ALLERGIES:   Patient has no noted allergies.    RISK FACTORS:  Tobacco use:   never smoker  Passive smoke exposure: No  Alcohol Use:   Yes    HISTORY OF PRESENT ILLNESS:    Libby Elliott is a 68-year-old female with left hip pain going back a few months.  It has been getting progressively worse.  She went to her primary doctor and obtained x-rays of her hip which showed that she had some degenerative changes in her left hip.  This included some calcification of the labrum about her left hip with maintenance of good cartilage height yet in her right and her left hips.  The timing comes on here and there.  It is not a constant pain.  It bothers her when she is standing a lot more.    The pain is located primarily on the outside of her hip.  She does not have any groin pain.  She does not have any significant buttock pain, it is mostly on the outside of the greater trochanter where she has most of her pain.    A complete review of systems is negative other than the history of present illness and past medical history.     The patient's health history form dated 07/15/2019 was reviewed and signed.  Past medical history, surgical history, social history, family history, and review of systems noted.     PAST MEDICAL HISTORY:    Arthritis    PAST ORTHOPEDIC SURGICAL HISTORY:    Foot/Ankle    PAST SURGICAL HISTORY:     Sections    FAMILY HISTORY:    Father:  Heart  Mother:  Cancer  Brother:  Heart  Sister:  Cancer    SOCIAL HISTORY:   Occupation:  Retired  Marital Status:    Alcohol Use:  Yes  Tobacco Use:  Never  Secondhand Smoke Exposure:  No  History of HIV:  No  History of Hepatitis:  No    REVIEW OF SYSTEMS:  Swelling:  No  Cold extremities: No  Rash or  Itching: No  Bruising:  No  Numbness/Tingling: Yes    PHYSICAL EXAMINATION:    General:  On examination today this is a 68-year-old female in no acute distress.  She is very pleasant on examination.   The patient is alert and oriented x3 and cooperative during the physical exam with a normal mood and affect.    Skin:  Intact over the left hip; no erythema, warmth, rashes or bruising.    Cardiovascular:  Normal capillary refill of the left lower extremity with a palpable dorsalis pedis pulse.  No evidence of lower extremity DVT.    Neurologic:  Normal sensation and motor function in the superficial peroneal, deep peroneal and tibial nerve distributions.    Musculoskeletal:  She has full flexion of her hip and full extension of her hip.  Internal rotation she only has about 45 degrees and external rotation she has about 10 degrees, right and left hips.  She is markedly tender to palpation over the greater trochanter on the left hip.  She is nontender to palpation over the right.  She does not have any significant pain with internal and external rotation of the left hip.    X-RAY:  X-ray examination shows calcification of the labrum over the left hip.  This would be a good reason for her decrease in range of motion.  However, there is good maintenance of cartilage height on the x-ray.  The hip does not appear to be outwardly arthritic at this point.    ASSESSMENT:    This is a 68-year-old female with greater trochanteric bursitis of her left hip.  She also has significant calcification of her labrum of the left hip, but this is not causing her a significant amount of pain.  Her symptoms are coming from greater trochanteric bursitis.    PROCEDURES:   Risks, benefits and alternatives of the procedure were reviewed with the patient.  After informed consent was obtained, the patient's left hip was prepped in a sterile fashion with alcohol.  Under sterile technique 40 mg Kenalog and 50 mg of lidocaine were injected in the  trochanteric region.  The patient tolerated the procedure well.  There were no complications.     PLAN:   We have given her an injection into the trochanteric bursa today.  She tolerated it well.  She had good relief of her pain.  We are going to see her back on an as needed basis for this.  I also taught her some exercises to try and strengthen the gluteus medius muscle tendon complex.  I will see her back on an as needed basis.    Dictated by Jeremy Pak MD   cc:  THERESA [address] at [facility]    D:  07/15/2019  T:  07/22/2019    Typed and/or reviewed and corrected by signing  below, and sent to the Physician for final review and signature.    This report was created using voice recording software and computer-generated templates. Although every effort has been made to review for and eliminate errors, some errors may still occur.

## 2019-10-01 ENCOUNTER — OFFICE VISIT (OUTPATIENT)
Dept: INTERNAL MEDICINE | Facility: OTHER | Age: 69
End: 2019-10-01
Attending: NURSE PRACTITIONER
Payer: MEDICARE

## 2019-10-01 VITALS
RESPIRATION RATE: 16 BRPM | BODY MASS INDEX: 26.87 KG/M2 | OXYGEN SATURATION: 98 % | WEIGHT: 168.2 LBS | SYSTOLIC BLOOD PRESSURE: 138 MMHG | HEART RATE: 75 BPM | DIASTOLIC BLOOD PRESSURE: 72 MMHG | TEMPERATURE: 97.6 F

## 2019-10-01 DIAGNOSIS — Z23 NEED FOR PNEUMOCOCCAL VACCINE: ICD-10-CM

## 2019-10-01 DIAGNOSIS — E53.8 VITAMIN B12 DEFICIENCY: ICD-10-CM

## 2019-10-01 DIAGNOSIS — R20.2 PARESTHESIA: Primary | ICD-10-CM

## 2019-10-01 DIAGNOSIS — Z23 NEED FOR IMMUNIZATION AGAINST INFLUENZA: ICD-10-CM

## 2019-10-01 DIAGNOSIS — M16.12 PRIMARY OSTEOARTHRITIS OF LEFT HIP: ICD-10-CM

## 2019-10-01 DIAGNOSIS — M70.62 TROCHANTERIC BURSITIS OF LEFT HIP: ICD-10-CM

## 2019-10-01 PROCEDURE — G0009 ADMIN PNEUMOCOCCAL VACCINE: HCPCS

## 2019-10-01 PROCEDURE — 90670 PCV13 VACCINE IM: CPT

## 2019-10-01 PROCEDURE — G0463 HOSPITAL OUTPT CLINIC VISIT: HCPCS | Mod: 25

## 2019-10-01 PROCEDURE — 99214 OFFICE O/P EST MOD 30 MIN: CPT | Performed by: NURSE PRACTITIONER

## 2019-10-01 PROCEDURE — G0463 HOSPITAL OUTPT CLINIC VISIT: HCPCS

## 2019-10-01 PROCEDURE — 90662 IIV NO PRSV INCREASED AG IM: CPT

## 2019-10-01 PROCEDURE — G0008 ADMIN INFLUENZA VIRUS VAC: HCPCS

## 2019-10-01 ASSESSMENT — PAIN SCALES - GENERAL: PAINLEVEL: SEVERE PAIN (7)

## 2019-10-01 NOTE — NURSING NOTE
Clinic Administered Medication Documentation    MEDICATION LIST:   Injectable Medication Documentation    Patient was given fluzone high dose. Prior to medication administration, verified patients identity using patient s name and date of birth. Please see MAR and medication order for additional information.     Was entire vial of medication used? Yes  Vial/Syringe: Syringe  Expiration Date:  04-01-20  Was this medication supplied by the patient? No   Clinic Administered Medication Documentation    MEDICATION LIST:   Injectable Medication Documentation    Patient was given prevnar 13. Prior to medication administration, verified patients identity using patient s name and date of birth. Please see MAR and medication order for additional information.     Was entire vial of medication used? Yes  Vial/Syringe: Syringe  Expiration Date:  05/21  Was this medication supplied by the patient? No  Yuliya Aguirre LPN .............10/1/2019  12:09 PM

## 2019-10-01 NOTE — PROGRESS NOTES
Chief Complaint   Patient presents with     Leg Pain       Medication Reconciliation: complete  Yuliya Aguirre LPN  ..................10/1/2019   11:26 AM

## 2019-10-01 NOTE — PROGRESS NOTES
Subjective:  She is here today for follow-up on paresthesias in the lower legs.  She states that this can occur in both her legs and usually begins at her knee and goes down to her toes.  She states that usually occurs with walking.  Does not always occur in both legs at the same time.  It is intermittent throughout the entire day.  It is not associated with pain.  Sometimes her legs feel a little weak.  She has not had any falls or injuries.  She also has some occasionally tingling in the right hand that usually occurs with holding her iPad or when she is sleeping at night.  Occurs a lot of times if she is holding something.  Has not noticed any weakness.  With initial evaluation she was found to have a B12 level of 254 which was on the low side of normal.  She started B12 supplement and had found that the paresthesias in the legs had resolved but then returned.  Denies blurred vision, double vision, headaches, ataxia, facial weakness, unilateral weakness of extremities and mood changes or memory changes.  She was seen by orthopedic specialist a few months ago and was treated for trochanteric bursitis with an injection.  She also has fairly severe degenerative hip arthritis and a spur in the labrum.  She did notice much improvement with the steroid injection.  She has not scheduled a follow-up appointment.  She really does not have any low back pain.    Patient Active Problem List   Diagnosis     Obesity     Healthcare maintenance     H/O adenomatous polyp of colon     GI bleed     Vitamin B12 deficiency     Osteoarthritis of left hip     Nevus of nose     Past Medical History:   Diagnosis Date     Asymptomatic menopausal state     approximately age 47     Past Surgical History:   Procedure Laterality Date     ANKLE SURGERY  2012    achilles tendon repair and heel spur removal      SECTION      x three     COLONOSCOPY      08,Next colonoscopy due in 2018.     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     Social History     Socioeconomic History     Marital status:      Spouse name: Not on file     Number of children: Not on file     Years of education: Not on file     Highest education level: Not on file   Occupational History     Not on file   Social Needs     Financial resource strain: Not on file     Food insecurity:     Worry: Not on file     Inability: Not on file     Transportation needs:     Medical: Not on file     Non-medical: Not on file   Tobacco Use     Smoking status: Never Smoker     Smokeless tobacco: Never Used     Tobacco comment: no ecig   Substance and Sexual Activity     Alcohol use: Yes     Frequency: Monthly or less     Drug use: No     Sexual activity: Not Currently   Lifestyle     Physical activity:     Days per week: Not on file     Minutes per session: Not on file     Stress: Not on file   Relationships     Social connections:     Talks on phone: Not on file     Gets together: Not on file     Attends Nondenominational service: Not on file     Active member of club or organization: Not on file     Attends meetings of clubs or organizations: Not on file     Relationship status: Not on file     Intimate partner violence:     Fear of current or ex partner: Not on file     Emotionally abused: Not on file     Physically abused: Not on file     Forced sexual activity: Not on file   Other Topics Concern     Not on file   Social History Narrative    Marcela Child; 02/28/80.    Citlali Child; 01/24/84.    Dexter Child; 03/21/72.     Ronald Spouse; date of birth [birth year]51.     Family History   Problem Relation Age of Onset     Breast Cancer Mother         Cancer-breast,living in her 80s     Cancer Mother         Cancer,kidney cancer     Heart Disease Father      Cancer Sister          Cancer,lung cancer, was a smoker     Cancer Sister         Cancer,lung CA     Other - See Comments Sister         hysterectomy     Family History Negative Sister         Good Health     Family History Negative Brother         Good Health     Family History Negative Brother         Good Health     Family History Negative Daughter         Good Health,02/28/80.     Family History Negative Daughter         Good Health,01/24/84.     Family History Negative Son         Good Health,03/21/72.     Family History Negative Other         Good Health,date of birth [birth year].     Thyroid Disease Maternal Grandmother      Current Outpatient Medications   Medication Sig Dispense Refill     Calcium Polycarbophil (FIBER) 625 MG tablet Take by mouth daily       cyanocobalamin (VITAMIN B-12) 1000 MCG tablet Take 1 tablet (1,000 mcg) by mouth daily       MAGNESIUM PO Take 1 tablet by mouth daily       Patient has no known allergies.      Review of Systems:  Review of Systems  See HPI  Objective:   /72 (BP Location: Right arm, Patient Position: Sitting, Cuff Size: Adult Regular)   Pulse 75   Temp 97.6  F (36.4  C) (Tympanic)   Resp 16   Wt 76.3 kg (168 lb 3.2 oz)   SpO2 98%   BMI 26.87 kg/m    Physical Exam  Pleasant female in no acute distress.  Affect normal.  Alert and oriented x4.  Skin color pink.  Mucous memories moist.  Neck supple without adenopathy.  No pain with palpation to the lumbar spine or sacroiliac joints.  She does have tenderness over the left trochanter.  Normal strength, sensation and circulation of the lower extremities.  DP PT intact.  Capillary refill less than 3 seconds.  DTRs symmetrical in lower extremities bilaterally.  Bilateral Tinel's and Phalen sign negative.  Cranial nerves II-XII intact.  Romberg's negative.  Gait appropriate without ataxia.  Extensive work-up completed last June unremarkable with the exception of a low normal B12.  X-rays of left hip and lumbar spine reviewed and  discussed.    Assessment:    ICD-10-CM    1. Paresthesia- legs and right hand R20.2 EMG   2. Vitamin B12 deficiency E53.8    3. Trochanteric bursitis of left hip M70.62    4. Primary osteoarthritis of left hip M16.12    5. Need for immunization against influenza Z23 HC FLU VACCINE, INCREASED ANTIGEN, PRESV FREE   6. Need for pneumococcal vaccine Z23 GH IMM-  PNEUMOCOCCAL CONJ VACCINE 13 VALENT IM       Plan:   This time we will go to proceed with getting EMGs of the bilateral lower extremities and the right hand.  She was schedule a follow-up appointment about 2 to 3 weeks after the EMG to discuss results.  She will continue with B12 supplement daily.  Recommend that she schedule follow-up appointment with orthopedics to discuss trochanteric bursitis and osteoarthritis of the left hip as she really did not notice much improvement.  If patient developed significant changes with the paresthesias or develop any significant weakness, etc. she needs to be evaluated.  Influenza vaccine and Prevnar 13 vaccine provided.    THERESA Barry   10/1/2019  11:56 AM

## 2019-10-01 NOTE — NURSING NOTE
Chief Complaint   Patient presents with     Leg Pain       Medication Reconciliation: complete  Yuliya Aguirre LPN  ..................10/1/2019   11:22 AM

## 2019-11-12 ENCOUNTER — HOSPITAL ENCOUNTER (OUTPATIENT)
Dept: MAMMOGRAPHY | Facility: OTHER | Age: 69
Discharge: HOME | End: 2019-11-12
Attending: NURSE PRACTITIONER | Admitting: NURSE PRACTITIONER
Payer: MEDICARE

## 2019-11-12 DIAGNOSIS — Z12.31 VISIT FOR SCREENING MAMMOGRAM: ICD-10-CM

## 2019-11-12 PROCEDURE — 77063 BREAST TOMOSYNTHESIS BI: CPT

## 2019-11-26 ENCOUNTER — OFFICE VISIT (OUTPATIENT)
Dept: INTERNAL MEDICINE | Facility: OTHER | Age: 69
End: 2019-11-26
Attending: NURSE PRACTITIONER
Payer: MEDICARE

## 2019-11-26 ENCOUNTER — TELEPHONE (OUTPATIENT)
Dept: INTERNAL MEDICINE | Facility: OTHER | Age: 69
End: 2019-11-26

## 2019-11-26 VITALS
RESPIRATION RATE: 16 BRPM | TEMPERATURE: 97.5 F | WEIGHT: 172.6 LBS | DIASTOLIC BLOOD PRESSURE: 80 MMHG | BODY MASS INDEX: 27.57 KG/M2 | OXYGEN SATURATION: 97 % | SYSTOLIC BLOOD PRESSURE: 122 MMHG | HEART RATE: 71 BPM

## 2019-11-26 DIAGNOSIS — M70.62 TROCHANTERIC BURSITIS OF LEFT HIP: ICD-10-CM

## 2019-11-26 DIAGNOSIS — E53.8 VITAMIN B12 DEFICIENCY: ICD-10-CM

## 2019-11-26 DIAGNOSIS — G56.01 CARPAL TUNNEL SYNDROME OF RIGHT WRIST: Primary | ICD-10-CM

## 2019-11-26 DIAGNOSIS — M65.30 TRIGGER FINGER, ACQUIRED: ICD-10-CM

## 2019-11-26 DIAGNOSIS — R20.2 PARESTHESIA: ICD-10-CM

## 2019-11-26 LAB — VIT B12 SERPL-MCNC: 821 PG/ML (ref 180–914)

## 2019-11-26 PROCEDURE — 82607 VITAMIN B-12: CPT | Mod: ZL | Performed by: NURSE PRACTITIONER

## 2019-11-26 PROCEDURE — 36415 COLL VENOUS BLD VENIPUNCTURE: CPT | Mod: ZL | Performed by: NURSE PRACTITIONER

## 2019-11-26 PROCEDURE — G0463 HOSPITAL OUTPT CLINIC VISIT: HCPCS

## 2019-11-26 PROCEDURE — 99213 OFFICE O/P EST LOW 20 MIN: CPT | Performed by: NURSE PRACTITIONER

## 2019-11-26 PROCEDURE — 83921 ORGANIC ACID SINGLE QUANT: CPT | Mod: ZL | Performed by: NURSE PRACTITIONER

## 2019-11-26 ASSESSMENT — PAIN SCALES - GENERAL: PAINLEVEL: SEVERE PAIN (6)

## 2019-11-26 NOTE — PATIENT INSTRUCTIONS
Aleve 2 tablets daily with food.  Wear splint at night and during day with activity.  You will be referred to orthopedics for evaluation of your wrist, trigger finger and hip bursitis.

## 2019-11-26 NOTE — PROGRESS NOTES
Subjective:  She is here today to follow-up on EMG results.  She had this completed 6 days ago.  She was told by Dr. Julian, neurologist that he would call me with report the following day and that I would have results.  Patient has been found to have a B12 deficiency and has been on oral B12 1000 mcg daily since .  Initially she thought that the paresthesias were getting better but then returned back to where they had been prior to B12 treatment.  She is due to have her B12 level checked today.  She was also told by Dr. Julian that she had paresthesias in the right hand due to right carpal tunnel.  She bought an over-the-counter wrist splint and has been wearing that at nighttime but not during the day.  She believes it holding her iPad is what is causing her symptoms.  She takes Aleve intermittently may be a few times a week but not daily.  He also told her he believes she had left carpal tunnel as well but did not do an EMG on that side.  She has not yet scheduled appoint with orthopedics to follow-up on the left trochanter bursitis.  She was waiting to get her EMG results before she scheduled an appointment.    Patient Active Problem List   Diagnosis     Obesity     Healthcare maintenance     H/O adenomatous polyp of colon     GI bleed     Vitamin B12 deficiency     Osteoarthritis of left hip     Nevus of nose     Past Medical History:   Diagnosis Date     Asymptomatic menopausal state     approximately age 47     Past Surgical History:   Procedure Laterality Date     ANKLE SURGERY      achilles tendon repair and heel spur removal      SECTION      x three     COLONOSCOPY      08,Next colonoscopy due in 2018.     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     Social History      Socioeconomic History     Marital status:      Spouse name: Not on file     Number of children: Not on file     Years of education: Not on file     Highest education level: Not on file   Occupational History     Not on file   Social Needs     Financial resource strain: Not on file     Food insecurity:     Worry: Not on file     Inability: Not on file     Transportation needs:     Medical: Not on file     Non-medical: Not on file   Tobacco Use     Smoking status: Never Smoker     Smokeless tobacco: Never Used     Tobacco comment: no ecig   Substance and Sexual Activity     Alcohol use: Yes     Frequency: Monthly or less     Drug use: No     Sexual activity: Not Currently   Lifestyle     Physical activity:     Days per week: Not on file     Minutes per session: Not on file     Stress: Not on file   Relationships     Social connections:     Talks on phone: Not on file     Gets together: Not on file     Attends Caodaism service: Not on file     Active member of club or organization: Not on file     Attends meetings of clubs or organizations: Not on file     Relationship status: Not on file     Intimate partner violence:     Fear of current or ex partner: Not on file     Emotionally abused: Not on file     Physically abused: Not on file     Forced sexual activity: Not on file   Other Topics Concern     Not on file   Social History Narrative    Marcela Child; [birth year]/28/80.    Citlali Child; 01/24/84.    Dexter Child; 03/21/72.     Ronald Spouse; date of birth [birth year].     Family History   Problem Relation Age of Onset     Breast Cancer Mother         Cancer-breast,living in her 80s     Cancer Mother         Cancer,kidney cancer     Heart Disease Father      Cancer Sister         Cancer,lung cancer, was a smoker     Cancer Sister         Cancer,lung CA     Other - See Comments Sister         hysterectomy     Family History Negative Sister         Good Health     Family History Negative Brother         Good Health      Family History Negative Brother         Good Health     Family History Negative Daughter         Good Health,02/28/80.     Family History Negative Daughter         Good Health,01/24/84.     Family History Negative Son         Good Health,03/21/72.     Family History Negative Other         Good Health,date of birth [birth year].     Thyroid Disease Maternal Grandmother      Current Outpatient Medications   Medication Sig Dispense Refill     Calcium Polycarbophil (FIBER) 625 MG tablet Take by mouth daily       cyanocobalamin (VITAMIN B-12) 1000 MCG tablet Take 1 tablet (1,000 mcg) by mouth daily       MAGNESIUM PO Take 1 tablet by mouth daily       Patient has no known allergies.      Review of Systems:  Review of Systems  See HPI  Objective:   /80 (BP Location: Right arm, Patient Position: Sitting, Cuff Size: Adult Regular)   Pulse 71   Temp 97.5  F (36.4  C) (Tympanic)   Resp 16   Wt 78.3 kg (172 lb 9.6 oz)   SpO2 97%   BMI 27.57 kg/m    Physical Exam  Pleasant female no acute distress.  Affect normal.  Alert and oriented x4.  Right wrist with no significant thenar atrophy.  She does have a trigger finger of the third digit of the right hand.  EMG results not available.  I did not receive a phone call from Dr. Julian to discuss results either.  Explained to patient that normally takes 2 to 3 weeks for us to receive results.    Assessment:    ICD-10-CM    1. Carpal tunnel syndrome of right wrist G56.01 ORTHOPEDICS ADULT REFERRAL   2. Vitamin B12 deficiency E53.8 Vitamin B12     Methylmalonic Acid   3. Paresthesia- legs and right hand R20.2 Vitamin B12   4. Trochanteric bursitis of left hip M70.62 ORTHOPEDICS ADULT REFERRAL   5. Trigger finger, acquired M65.30 ORTHOPEDICS ADULT REFERRAL       Plan:   1.  She will be referred to orthopedics for evaluation of carpal tunnel, trochanteric bursitis and trigger finger.  She will wear a wrist splint at nighttime and during the day with activity that cause her  symptoms.  Recommend taking Aleve 2 pills daily with food for 10 days to reduce some of the inflammation.  If she develops GI upset then she needs to stop the medication.  Any symptoms suggestive of GI bleed she needs to discontinue medication and be seen urgently.  Those symptoms reviewed and discussed with patient.  Wrist splint ordered for patient.  2.  Check B12 and MMA level.  Explained to patient that most of the time oral B12 is sufficient but if value still low then may need injectable.  We could always refer her to neurologist Dr. Shi to evaluate if not improving and normal B12 level.  We will also call down and try to get results of EMG to review as they were not available today.    THERESA Barry   11/26/2019  8:06 AM

## 2019-11-26 NOTE — TELEPHONE ENCOUNTER
Spoke with Dr. Julian's office and they will fax. Yuliya Aguirre LPN .............11/26/2019  10:09 AM

## 2019-11-26 NOTE — NURSING NOTE
Chief Complaint   Patient presents with     Follow Up     EMG       Medication Reconciliation: complete  Yuliya Aguirre LPN  ..................11/26/2019   7:42 AM

## 2019-11-29 LAB — METHYLMALONATE SERPL-SCNC: 0.16 UMOL/L (ref 0–0.4)

## 2020-01-06 ENCOUNTER — OFFICE VISIT (OUTPATIENT)
Dept: ORTHOPEDICS | Facility: OTHER | Age: 70
End: 2020-01-06
Attending: NURSE PRACTITIONER
Payer: MEDICARE

## 2020-01-06 DIAGNOSIS — M70.62 TROCHANTERIC BURSITIS OF LEFT HIP: ICD-10-CM

## 2020-01-06 DIAGNOSIS — G56.01 CARPAL TUNNEL SYNDROME OF RIGHT WRIST: ICD-10-CM

## 2020-01-06 DIAGNOSIS — M65.30 TRIGGER FINGER, ACQUIRED: ICD-10-CM

## 2020-01-06 PROCEDURE — G0463 HOSPITAL OUTPT CLINIC VISIT: HCPCS

## 2020-01-10 NOTE — PROGRESS NOTES
VITALS:   Height:   66  Weight:   163  Pulse rate:  68  Blood Pressure:  112 / 78    CHIEF COMPLAINT: Left Hip Pain Recheck,Bilateral CTS& Right Middle Trigger Finger    PROBLEMS:   Patient has no noted problems.    PATIENT REPORTED MEDICATIONS:  MAGNESIUM TABLET (MAGNESIUM TABS)   B-12 TABLET (CYANOCOBALAMIN TABS)     Medications were reviewed with patient this visit.    PATIENT REPORTED ALLERGIES:   Patient has no noted allergies.      HISTORY OF PRESENT ILLNESS:    CHIEF COMPLAINT:  This is a 69-year-old female who comes in left hip pain, as well as right carpal tunnel syndrome and trigger finger on the right involving the middle finger.      HISTORY OF PRESENT ILLNESS:  She states that she has had bilateral leg pain going down the front from the knees all the way down.  She has significant left hip pain, as well as bilateral carpal tunnel syndrome worse on the right than on the left left, and she has right middle finger pain.  It has been going on for about a year.  It is quite bothersome, usually at night as far as the hand pain is concerned, as well as the trigger finger pain.      With regard to he left hip the pain is primarily on the outside of her hip.  It bothers her whenever she walks.  The leg pain comes on intermittently.  It can bother her after her she gets out of a car.  It can bother her when she is seated for a long period of time.       PAST MEDICAL HISTORY:  The patient's health history form dated 2020 was reviewed and signed.  Past medical history, medications, allergies, surgical history, social history, family history, and review of systems noted and scanned into EMR.  ALLERGIES:  No known medication allergies.     PAST MEDICAL HISTORY:    Arthritis    PAST ORTHOPEDIC SURGICAL HISTORY:    Foot/Ankle    PAST SURGICAL HISTORY:     Sections    FAMILY HISTORY:    Father:  Heart  Mother:  Cancer  Brother:  Heart  Sister:  Cancer    SOCIAL HISTORY:   Marital Status:     Occupation:  Retired  Alcohol Use:  Yes  Tobacco Use:  Never  Secondhand Smoke Exposure:  No  History of HIV:  No  History of Hepatitis:  No    PHYSICAL EXAMINATION:    On examination today she is neuro and vascularly intact in bilateral upper extremities.  She has normal muscle bulk and tone.  She is largely nontender to palpation except for the A1-pulley over the right middle finger which does show a significant amount of tenderness to palpation.  There is no triggering and no obvious feelings of nodules within the tendons on examination.  She is otherwise neuro and vascularly intact.    Examination of bilateral lower extremities shows tenderness to palpation over the left greater trochanter; otherwise, she has full range of motion of the hip.  She is otherwise neuro and vascularly intact on exam today.  She has normal sensation and normal perfusion, with normal dorsalis pedis pulses pulses bilaterally.       X-RAY:  X-ray examination of the hip shows some mild very mild osteoarthritis.  No other significant abnormalities are really appreciated.    X-ray examination of the lumbar spine shows some mild degenerative change at the endplate; otherwise, good maintenance of the disk height.with only some very mild loss of disk height at L5-S1.  Only mildly decreased lumbar lordosis.      EMG:  Review of the upper extremity and lower extremity EMG shows evidence for previous B12 deficiency neuropathy.  She has mild to moderate carpal tunnel syndrome and was already given splints at night for sleeping.  She is also taking ibuprofen for that per Dr. Julian.      ASSESSMENT:    IMPRESSION:         1.  Left hip pain.       2.  Bilateral carpal tunnel syndrome.        3.  Right middle finger triggering and pain.     PLAN:   Really we just talked about all of her issues today.  She is going to try doing abductor exercises and tensor fascia stretches for the left hip.  As for the carpal tunnel syndrome she is currently  trying the cockup wrist splints.  She is not interested in injection.  I would not jump straight to a surgical intervention for the trigger finger unless she actually has triggering.  Right now she is painful which is usually an indication for an injection to see if we can calm that down and stave off any trigger finger issues; otherwise, the remainder of the pain in the legs is likely coming from her B12 deficiency previously.  She understands all of this.  We are going to see her back on an as-needed basis.      Dictated by:  Jeremy Pak MD  Copy to:  THERESA Monroe    D:  01/06/2020  T:  01/09/2020    Typed and/or reviewed and corrected by signing  below, and sent to the Physician for final review and signature.      This report was created using voice recording software and computer-generated templates. Although every effort has been made to review for and eliminate errors, some errors may still occur.

## 2020-12-15 ENCOUNTER — HOSPITAL ENCOUNTER (OUTPATIENT)
Dept: MAMMOGRAPHY | Facility: OTHER | Age: 70
Discharge: HOME | End: 2020-12-15
Attending: NURSE PRACTITIONER | Admitting: NURSE PRACTITIONER
Payer: MEDICARE

## 2020-12-15 DIAGNOSIS — Z12.31 VISIT FOR SCREENING MAMMOGRAM: ICD-10-CM

## 2020-12-15 PROCEDURE — 77063 BREAST TOMOSYNTHESIS BI: CPT

## 2021-09-17 ENCOUNTER — OFFICE VISIT (OUTPATIENT)
Dept: INTERNAL MEDICINE | Facility: OTHER | Age: 71
End: 2021-09-17
Attending: NURSE PRACTITIONER
Payer: MEDICARE

## 2021-09-17 VITALS
HEART RATE: 77 BPM | OXYGEN SATURATION: 98 % | DIASTOLIC BLOOD PRESSURE: 82 MMHG | TEMPERATURE: 97 F | SYSTOLIC BLOOD PRESSURE: 124 MMHG | HEIGHT: 66 IN | RESPIRATION RATE: 16 BRPM | BODY MASS INDEX: 27.71 KG/M2 | WEIGHT: 172.4 LBS

## 2021-09-17 DIAGNOSIS — Z23 NEED FOR PROPHYLACTIC VACCINATION AND INOCULATION AGAINST INFLUENZA: ICD-10-CM

## 2021-09-17 DIAGNOSIS — F43.21 GRIEF REACTION: ICD-10-CM

## 2021-09-17 DIAGNOSIS — Z00.00 ENCOUNTER FOR MEDICARE ANNUAL WELLNESS EXAM: Primary | ICD-10-CM

## 2021-09-17 DIAGNOSIS — Z78.0 POSTMENOPAUSAL STATUS: ICD-10-CM

## 2021-09-17 DIAGNOSIS — Z11.59 ENCOUNTER FOR HEPATITIS C SCREENING TEST FOR LOW RISK PATIENT: ICD-10-CM

## 2021-09-17 DIAGNOSIS — E78.2 MIXED HYPERLIPIDEMIA: ICD-10-CM

## 2021-09-17 DIAGNOSIS — E53.8 VITAMIN B12 DEFICIENCY: ICD-10-CM

## 2021-09-17 LAB
ALBUMIN SERPL-MCNC: 4.4 G/DL (ref 3.5–5.7)
ALP SERPL-CCNC: 63 U/L (ref 34–104)
ALT SERPL W P-5'-P-CCNC: 12 U/L (ref 7–52)
ANION GAP SERPL CALCULATED.3IONS-SCNC: 7 MMOL/L (ref 3–14)
AST SERPL W P-5'-P-CCNC: 14 U/L (ref 13–39)
BILIRUB SERPL-MCNC: 0.7 MG/DL (ref 0.3–1)
BUN SERPL-MCNC: 16 MG/DL (ref 7–25)
CALCIUM SERPL-MCNC: 9.6 MG/DL (ref 8.6–10.3)
CHLORIDE BLD-SCNC: 108 MMOL/L (ref 98–107)
CHOLEST SERPL-MCNC: 228 MG/DL
CO2 SERPL-SCNC: 28 MMOL/L (ref 21–31)
CREAT SERPL-MCNC: 0.87 MG/DL (ref 0.6–1.2)
ERYTHROCYTE [DISTWIDTH] IN BLOOD BY AUTOMATED COUNT: 14.1 % (ref 10–15)
FASTING STATUS PATIENT QL REPORTED: ABNORMAL
GFR SERPL CREATININE-BSD FRML MDRD: 69 ML/MIN/1.73M2
GLUCOSE BLD-MCNC: 105 MG/DL (ref 70–105)
HCT VFR BLD AUTO: 46 % (ref 35–47)
HDLC SERPL-MCNC: 46 MG/DL (ref 23–92)
HGB BLD-MCNC: 15.1 G/DL (ref 11.7–15.7)
LDLC SERPL CALC-MCNC: 162 MG/DL
MCH RBC QN AUTO: 28.7 PG (ref 26.5–33)
MCHC RBC AUTO-ENTMCNC: 32.8 G/DL (ref 31.5–36.5)
MCV RBC AUTO: 87 FL (ref 78–100)
NONHDLC SERPL-MCNC: 182 MG/DL
PLATELET # BLD AUTO: 235 10E3/UL (ref 150–450)
POTASSIUM BLD-SCNC: 4.3 MMOL/L (ref 3.5–5.1)
PROT SERPL-MCNC: 6.8 G/DL (ref 6.4–8.9)
RBC # BLD AUTO: 5.27 10E6/UL (ref 3.8–5.2)
SODIUM SERPL-SCNC: 143 MMOL/L (ref 134–144)
TRIGL SERPL-MCNC: 100 MG/DL
WBC # BLD AUTO: 7 10E3/UL (ref 4–11)

## 2021-09-17 PROCEDURE — 36415 COLL VENOUS BLD VENIPUNCTURE: CPT | Mod: ZL | Performed by: NURSE PRACTITIONER

## 2021-09-17 PROCEDURE — 80061 LIPID PANEL: CPT | Mod: ZL | Performed by: NURSE PRACTITIONER

## 2021-09-17 PROCEDURE — G0439 PPPS, SUBSEQ VISIT: HCPCS | Performed by: NURSE PRACTITIONER

## 2021-09-17 PROCEDURE — G0008 ADMIN INFLUENZA VIRUS VAC: HCPCS

## 2021-09-17 PROCEDURE — 82040 ASSAY OF SERUM ALBUMIN: CPT | Mod: ZL | Performed by: NURSE PRACTITIONER

## 2021-09-17 PROCEDURE — 90662 IIV NO PRSV INCREASED AG IM: CPT

## 2021-09-17 PROCEDURE — G0463 HOSPITAL OUTPT CLINIC VISIT: HCPCS | Mod: 25

## 2021-09-17 PROCEDURE — 86803 HEPATITIS C AB TEST: CPT | Mod: ZL | Performed by: NURSE PRACTITIONER

## 2021-09-17 PROCEDURE — 85048 AUTOMATED LEUKOCYTE COUNT: CPT | Mod: ZL | Performed by: NURSE PRACTITIONER

## 2021-09-17 ASSESSMENT — ENCOUNTER SYMPTOMS
TROUBLE SWALLOWING: 0
DYSURIA: 0
DIZZINESS: 0
DYSPHORIC MOOD: 0
WEAKNESS: 0
FEVER: 0
VOMITING: 0
HEADACHES: 0
SHORTNESS OF BREATH: 0
ENDOCRINE NEGATIVE: 1
MYALGIAS: 0
NERVOUS/ANXIOUS: 0
SORE THROAT: 0
BLOOD IN STOOL: 0
TREMORS: 0
HEMATURIA: 0
ANAL BLEEDING: 0
NAUSEA: 0
DIARRHEA: 0
ABDOMINAL PAIN: 0
FATIGUE: 0
UNEXPECTED WEIGHT CHANGE: 0
DIFFICULTY URINATING: 0
LIGHT-HEADEDNESS: 0
CONSTIPATION: 0
COUGH: 0
ARTHRALGIAS: 0
PALPITATIONS: 0
SLEEP DISTURBANCE: 0

## 2021-09-17 ASSESSMENT — MIFFLIN-ST. JEOR: SCORE: 1331

## 2021-09-17 ASSESSMENT — PATIENT HEALTH QUESTIONNAIRE - PHQ9: SUM OF ALL RESPONSES TO PHQ QUESTIONS 1-9: 7

## 2021-09-17 ASSESSMENT — PAIN SCALES - GENERAL: PAINLEVEL: NO PAIN (0)

## 2021-09-17 NOTE — PROGRESS NOTES
The patient's PHQ-9 score is consistent with mild depression. She was provided with information regarding depression and was advised to schedule a follow up appointment in if needed to further address this issue.

## 2021-09-17 NOTE — PATIENT INSTRUCTIONS
"  Patient Education   Personalized Prevention Plan  You are due for the preventive services outlined below.  Your care team is available to assist you in scheduling these services.  If you have already completed any of these items, please share that information with your care team to update in your medical record.  Health Maintenance Due   Topic Date Due     Osteoporosis Screening  Never done     Hepatitis C Screening  Never done     Diptheria Tetanus Pertussis (DTAP/TDAP/TD) Vaccine (1 - Tdap) Never done     FALL RISK ASSESSMENT  11/26/2020     Flu Vaccine (1) 09/01/2021       Depression and Suicide in Older Adults    Nearly 2 million older Americans have some type of depression. Some of them even take their own lives. Yet depression among older adults is often ignored. Learn the warning signs. You may help spare a loved one needless pain. You may also save a life.   What is depression?  Depression is a common and serious illness that affects the way you think and feel. It is not a normal part of aging, nor is it a sign of weakness, a character flaw, or something you can snap out of. Most people with depression need treatment to get better. The most common symptom is a feeling of deep sadness. People who are depressed also may seem tired and listless. And nothing seems to give them pleasure. It s normal to grieve or be sad sometimes. But sadness lessens or passes with time. Depression rarely goes away or improves on its own. A person with clinical depression can't \"snap out of it.\" Other symptoms of depression are:     Sleeping more or less than normal    Eating more or less than normal    Having headaches, stomachaches, or other pains that don t go away    Feeling nervous,  empty,  or worthless    Crying a great deal    Thinking or talking about suicide or death    Loss of interest in activities previously enjoyed    Social isolation    Feeling confused or forgetful  What causes it?  The causes of depression " aren t fully known. But it is thought to result from a complex blend of these factors:     Biochemistry. Certain chemicals in the brain play a role.    Genes. Depression does run in families.    Life stress. Life stresses can also trigger depression in some people. Older adults often face many stressors, such as death of friends or a spouse, health problems, and financial concerns.    Chronic conditions. This includes conditions such as diabetes, heart disease, or cancer. These can cause symptoms of depression. Medicine side effects can cause changes in thoughts and behaviors.  How you can help  Often, depressed people may not want to ask for help. When they do, they may be ignored. Or, they may receive the wrong treatment. You can help by showing parents and older friends love and support. If they seem depressed, don t lecture the person, ignore the symptoms, or discount the symptoms as a  normal  part of aging -which they are not. Get involved, listen, and show interest and support.   Help them understand that depression is a treatable illness. Tell them you can help them find the right treatment. Offer to go to their healthcare provider's appointment with them for support when the symptoms are discussed. With their approval, contact a local mental health center, social service agency, or hospital about services.   You can be an advocate for him or her at healthcare appointments. Many older adults have chronic illnesses that can cause symptoms of depression. Medicine side effects can change thoughts and behaviors. You can help make sure that the healthcare provider looks at all of these factors. He or she should refer your family member or friend to a mental healthcare provider when needed. in some cases, untreated depression can lead to a misdiagnosis. A person may be diagnosed with a brain disorder such as dementia. If the healthcare provider does not take the issue of depression seriously, help your family  member or friend to find another provider.   Don't be afraid to ask  If you think an older person you care about could be suicidal, ask,  Have you thought about suicide?  Most people will tell you the truth. If they say  yes,  they may already have a plan for how and when they will attempt it. Find out as much as you can. The more detailed the plan, and the easier it is to carry out, the more danger the person is in right now. Tell the person you are there for them and do not want them to harm him or herself. Don't wait to get help for the person. Call the person's healthcare provider, local hospital, or emergency services.   To learn more    National Suicide Prevention Lifeline (crisis hotline) 061-899-ZKHS (180-446-1715)    National [place] of Mental Shunqz927-847-7824vxn.[facility].nih.gov    National [place] on Mental Sugnvgx438-955-4443grb.sunny.org    Mental Health Tudpmvn377-923-5972eym.[facility].org    National Suicide Snchnwf402-MZLNCSV (258-389-4048)    Call 911  Never leave the person alone. A person who is actively suicidal needs psychiatric care right away. They will need constant supervision. Never leave the person out of sight. Call 911 or the national 24-hour suicide crisis hotline at 238-249-XYRB (647-756-3308). You can also take the person to the closest emergency room.   [employer] last reviewed this educational content on 5/1/2020 2000-2021 [employer]. All rights reserved. This information is not intended as a substitute for professional medical care. Always follow your healthcare professional's instructions.

## 2021-09-17 NOTE — PROGRESS NOTES
"Medicare Wellness Visit   Ms. Elliott is a 70 year old female who presents today who presents for Preventive Visit.      Are you in the first 12 months of your Medicare coverage?  No    Health Risk Assessment     Do you feel safe in your environment? Yes    Physical Health:    In general, how would you rate your overall physical health? good    Outside of work, how many days during the week do you exercise? 2-3 days/week    Outside of work, approximately how many minutes a day do you exercise?15-30 minutes    If you drink alcohol do you typically have >3 drinks per day or >7 drinks per week? No    Do you usually eat at least 4 servings of fruit and vegetables a day, include whole grains & fiber and avoid regularly eating high fat or \"junk\" foods? NO    Do you have any problems taking medications regularly?  No    Do you have any side effects from medications? none    Needs assistance for the following daily activities: no assistance needed    Which of the following safety concerns are present in your home?  none identified     Hearing impairment: No    In the past 6 months, have you been bothered by leaking of urine? no      Screening     Fall risk  Fallen 2 or more times in the past year?: No  Any fall with injury in the past year?: No    Cognitive Screening   1) Repeat 3 items (Leader, Season, Table)    2) Clock draw: NORMAL  3) 3 item recall: Recalls 2 objects   Results: NORMAL clock, 1-2 items recalled: COGNITIVE IMPAIRMENT LESS LIKELY    Mini-CogTM Copyright KAREN Martin. Licensed by the author for use in [facility]; reprinted with permission (carol@.[email or web address]). All rights reserved.      Do you have sleep apnea, excessive snoring or daytime drowsiness?: no    Breast cancer screenin/20    Regular dental visits:     Eye exam with in 2 years: yes      End of Life Planning     Have you ever done Advance Care Planning? (For example, a Health Directive, POLST, or a discussion with a medical provider or " your loved ones about your wishes): Patient would like paperwork      End of Life Planning:  Patient currently has an advanced directive: Yes.  Practitioner is supportive of decision.  Full Code        Medical Record Update     Reviewed and updated as needed this visit by clinical staff  Tobacco  Allergies  Meds  Med Hx  Surg Hx  Fam Hx  Soc Hx      Reviewed and updated as needed this visit by Provider  Tobacco     Med Hx  Surg Hx  Fam Hx          Current Outpatient Medications   Medication     Calcium Polycarbophil (FIBER) 625 MG tablet     cyanocobalamin (VITAMIN B-12) 1000 MCG tablet     MAGNESIUM PO     No current facility-administered medications for this visit.          Current providers sharing in care for this patient include:   Patient Care Team:  Jeane Cruz NP as PCP - General (Nurse Practitioner)  Jeane Cruz NP as Assigned PCP     Subjective:   Patient is here today for chronic disease management of hyperlipidemia and vitamin B12 deficiency and also annual Medicare wellness visit.  She is not on statin or aspirin.  She does take vitamin B12 1000 mcg daily.  She is planning to have mammogram in 2021.  She is up-to-date on colonoscopy.  She has never had bone density scan.  She does not take calcium or vitamin D supplementation.  She is in need of influenza vaccine.  Unsure of last Tdap but does not believe she has had it in the last 10 years.  She reports that on 2021 her   of a sudden heart attack.  She has been grieving.  She lives alone.  The grieving process has been difficult but she feels that it is getting better.  She does not feel that she needs any counseling nor medication for anxiety or depression.  She is sleeping most nights and appetite is good.  She does have children who are supportive.  Some live in the area.    Review of Systems   Constitutional: Negative for fatigue, fever and unexpected weight change.   HENT: Negative  "for ear pain, mouth sores, sore throat and trouble swallowing.    Eyes: Negative for visual disturbance.   Respiratory: Negative for cough and shortness of breath.    Cardiovascular: Negative for chest pain, palpitations and leg swelling.   Gastrointestinal: Negative for abdominal pain, anal bleeding, blood in stool, constipation, diarrhea, nausea and vomiting.   Endocrine: Negative.    Genitourinary: Negative for difficulty urinating, dysuria, hematuria, vaginal bleeding and vaginal discharge.   Musculoskeletal: Negative for arthralgias, gait problem and myalgias.   Skin: Negative for rash.   Neurological: Negative for dizziness, tremors, weakness, light-headedness and headaches.   Psychiatric/Behavioral: Negative for dysphoric mood and sleep disturbance. The patient is not nervous/anxious.         See hpi          OBJECTIVE:     Vitals:    09/17/21 1002   BP: 124/82   BP Location: Right arm   Patient Position: Sitting   Cuff Size: Adult Regular   Pulse: 77   Resp: 16   Temp: 97  F (36.1  C)   TempSrc: Tympanic   SpO2: 98%   Weight: 78.2 kg (172 lb 6.4 oz)   Height: 1.68 m (5' 6.14\")        Estimated body mass index is 27.71 kg/m  as calculated from the following:    Height as of this encounter: 1.68 m (5' 6.14\").    Weight as of this encounter: 78.2 kg (172 lb 6.4 oz).     Physical Exam  Pleasant female no acute distress.  She is tearful at times when speaking of the loss of her .  Well-groomed and well-dressed.  Alert and oriented x4.  Sclera nonicteric.  Conjunctiva noninflamed.  TMs clear bilaterally.  Patient wearing facial mask secondary to pandemic but denies oral mucosal issues.  Neck supple and without adenopathy.  No thyromegaly.  No carotid bruits.  Lung fields clear to auscultation throughout.  Cardiovascular regular rate and rhythm with no murmur or S3.  Abdomen soft and without masses, tenderness and organomegaly.  No abdominal bruits or pulsatile masses.  No axillary or inguinal adenopathy.  " Extremities without edema.  DPPT intact.  Gait stable.  Gross range of motion of upper and lower extremities intact.      Labs reviewed in EPIC    ASSESSMENT / PLAN:       ICD-10-CM    1. Encounter for Medicare annual wellness exam  Z00.00    2. Mixed hyperlipidemia  E78.2 Comprehensive metabolic panel     Lipid Profile     Lipid Profile     Comprehensive metabolic panel   3. Vitamin B12 deficiency  E53.8 CBC with platelets     CBC with platelets   4. Postmenopausal status  Z78.0 DX Hip/Pelvis/Spine   5. Encounter for hepatitis C screening test for low risk patient  Z11.59 Hepatitis C antibody     Hepatitis C antibody   6. Need for prophylactic vaccination and inoculation against influenza  Z23 CANCELED: INFLUENZA, QUAD, HIGH DOSE, PF, 65YR + (FLUZONE HD)   7. Grief reaction  F43.21          Preventative Care Guidelines and Health Counseling     COUNSELING:  Reviewed preventive health counseling  Special attention given to:   Preventative screening  Regular exercise  Healthy diet/nutrition  Bladder control   Immunizations   Reviewed preventive health counseling, as reflected in patient instructions       Regular exercise       Healthy diet/nutrition       Vision screening       Hearing screening       Fall risk prevention       Alcohol Use        Osteoporosis prevention/bone health       Colon cancer screening       Hepatitis C screening       Advanced Planning     124/82   BP Screening:   Last 3 BP Readings:    BP Readings from Last 3 Encounters:   09/17/21 124/82   11/26/19 122/80   10/01/19 138/72       The following was recommended to the patient:  Re-screen BP within a year and recommended lifestyle modifications    Body mass index is 27.71 kg/m . Weight management plan: Discussed healthy diet and exercise guidelines        Appropriate preventive services were discussed with this patient, including applicable screening as appropriate for cardiovascular disease, diabetes, osteopenia/osteoporosis, and glaucoma.   As appropriate for age/gender, discussed screening for colorectal cancer, prostate cancer, breast cancer, and cervical cancer. Checklist reviewing preventive services available has been given to the patient.    Reviewed patients plan of care and provided an AVS. The Basic Care Plan (routine screening as documented in Health Maintenance) for patient meets the Care Plan requirement. This Care Plan has been established and reviewed with the Patient and any available family members.    Counseling Resources:  ATP IV Guidelines  Pooled Cohorts Equation Calculator  Breast Cancer Risk Calculator  FRAX Risk Assessment  ICSI Preventive Guidelines  Dietary Guidelines for Americans, 2010  USDA's MyPlate  ASA Prophylaxis  Lung CA Screening    PLAN:  Annual Medicare wellness visit completed.  Labs today includes lipids, CBC, CHEM 13 and hepatitis C.  Bone density scan ordered. She will have mammogram in December.  Up-to-date on colonoscopy.  Influenza vaccine provided.  She will check with local pharmacy on Tdap.  Continue with daily vitamin B12.  Recommend low-fat, low-cholesterol diet and daily walking program.  Consideration of cardiac calcium CT.  We did discuss the grieving process.  At this time she feels that she is doing well and progressing appropriately.  If at some point she feels that she is having difficulty with anxiety or depression then she will follow-up as medication and counseling may be appropriate.    THERESA Barry   9/17/2021   10:54 AM     [facility]

## 2021-09-17 NOTE — NURSING NOTE
"Chief Complaint   Patient presents with     Medicare Visit     physical       FOOD SECURITY SCREENING QUESTIONS  Hunger Vital Signs:  Within the past 12 months we worried whether our food would run out before we got money to buy more. Never  Within the past 12 months the food we bought just didn't last and we didn't have money to get more. Never  Ruth Blanchard LPN 9/17/2021 10:04 AM      Initial /82 (BP Location: Right arm, Patient Position: Sitting, Cuff Size: Adult Regular)   Pulse 77   Temp 97  F (36.1  C) (Tympanic)   Resp 16   Ht 1.68 m (5' 6.14\")   Wt 78.2 kg (172 lb 6.4 oz)   SpO2 98%   BMI 27.71 kg/m   Estimated body mass index is 27.71 kg/m  as calculated from the following:    Height as of this encounter: 1.68 m (5' 6.14\").    Weight as of this encounter: 78.2 kg (172 lb 6.4 oz).  Medication Reconciliation: complete    Ruth Blanchard LPN  "

## 2021-09-17 NOTE — NURSING NOTE
Immunization Documentation    Prior to Immunization administration, verified patients identity using patient's name and date of birth. Please see IMMUNIZATIONS  and order for additional information.  Patient / Parent instructed to remain in clinic for 15 minutes and report any adverse reaction to staff immediately.    Was the entire amount of vaccines given used? Yes    Ruth Blanchard LPN  9/17/2021   10:43 AM

## 2021-09-17 NOTE — LETTER
September 21, 2021      Libby THAKUR Earl  [address]  [place] [address]        Dear ,    We are writing to inform you of your test results.    The recent blood work shows that your cholesterol levels are elevated.  At this range and with your family history of heart disease in your father, I would recommend you consider starting a cholesterol-lowering medication such as Lipitor.  If you are interested in starting this medication, let me know and I will send over a prescription.  If you do start a medication then we would want to follow-up with blood work in approximately 6-8 weeks.  Your other blood work all looks fine.    Resulted Orders   Hepatitis C antibody   Result Value Ref Range    Hepatitis C Antibody Nonreactive Nonreactive    Narrative    Assay performance characteristics have not been established for newborns, infants, and children.   Lipid Profile   Result Value Ref Range    Cholesterol 228 (H) <200 mg/dL    Triglycerides 100 <150 mg/dL    Direct Measure HDL 46 23 - 92 mg/dL    LDL Cholesterol Calculated 162 (H) <=100 mg/dL    Non HDL Cholesterol 182 (H) <130 mg/dL    Patient Fasting > 8hrs? Unknown     Narrative    Cholesterol  Desirable:  <200 mg/dL    Triglycerides  Normal:  Less than 150 mg/dL  Borderline High:  150-199 mg/dL  High:  200-499 mg/dL  Very High:  Greater than or equal to 500 mg/dL    Direct Measure HDL  Female:  Greater than or equal to 50 mg/dL   Male:  Greater than or equal to 40 mg/dL    LDL Cholesterol  Desirable:  <100mg/dL  Above Desirable:  100-129 mg/dL   Borderline High:  130-159 mg/dL   High:  160-189 mg/dL   Very High:  >= 190 mg/dL    Non HDL Cholesterol  Desirable:  130 mg/dL  Above Desirable:  130-159 mg/dL  Borderline High:  160-189 mg/dL  High:  190-219 mg/dL  Very High:  Greater than or equal to 220 mg/dL   Comprehensive metabolic panel   Result Value Ref Range    Sodium 143 134 - 144 mmol/L    Potassium 4.3 3.5 - 5.1 mmol/L    Chloride 108 (H) 98 -  107 mmol/L    Carbon Dioxide (CO2) 28 21 - 31 mmol/L    Anion Gap 7 3 - 14 mmol/L    Urea Nitrogen 16 7 - 25 mg/dL    Creatinine 0.87 0.60 - 1.20 mg/dL    Calcium 9.6 8.6 - 10.3 mg/dL    Glucose 105 70 - 105 mg/dL    Alkaline Phosphatase 63 34 - 104 U/L    AST 14 13 - 39 U/L    ALT 12 7 - 52 U/L    Protein Total 6.8 6.4 - 8.9 g/dL    Albumin 4.4 3.5 - 5.7 g/dL    Bilirubin Total 0.7 0.3 - 1.0 mg/dL    GFR Estimate 69 >60 mL/min/1.73m2      Comment:      As of July 11, 2021, eGFR is calculated by the CKD-EPI creatinine equation, without race adjustment. eGFR can be influenced by muscle mass, exercise, and diet. The reported eGFR is an estimation only and is only applicable if the renal function is stable.   CBC with platelets   Result Value Ref Range    WBC Count 7.0 4.0 - 11.0 10e3/uL    RBC Count 5.27 (H) 3.80 - 5.20 10e6/uL    Hemoglobin 15.1 11.7 - 15.7 g/dL    Hematocrit 46.0 35.0 - 47.0 %    MCV 87 78 - 100 fL    MCH 28.7 26.5 - 33.0 pg    MCHC 32.8 31.5 - 36.5 g/dL    RDW 14.1 10.0 - 15.0 %    Platelet Count 235 150 - 450 10e3/uL       If you have any questions or concerns, please call the clinic at the number listed above.       Sincerely,      Jeane Cruz NP

## 2021-09-20 LAB — HCV AB SERPL QL IA: NONREACTIVE

## 2021-09-27 ENCOUNTER — TELEPHONE (OUTPATIENT)
Dept: INTERNAL MEDICINE | Facility: OTHER | Age: 71
End: 2021-09-27
Payer: MEDICARE

## 2021-09-27 DIAGNOSIS — E78.2 MIXED HYPERLIPIDEMIA: Primary | ICD-10-CM

## 2021-09-27 NOTE — TELEPHONE ENCOUNTER
Patient would like to start cholesterol medication as discussed previously. Yuliya Aguirre LPN .............9/27/2021  1:46 PM

## 2021-09-28 RX ORDER — ATORVASTATIN CALCIUM 10 MG/1
10 TABLET, FILM COATED ORAL DAILY
Qty: 90 TABLET | Refills: 3 | Status: SHIPPED | OUTPATIENT
Start: 2021-09-28 | End: 2022-09-09

## 2021-09-28 NOTE — TELEPHONE ENCOUNTER
Please call and let her know that I sent over the prescription for Lipitor 10 mg 1 tablet daily.  Since she is going to be starting this medication, also let her know that I would like her to start aspirin 81 mg 1 pill daily.

## 2021-09-28 NOTE — TELEPHONE ENCOUNTER
Called and gave the information to the patient. She is wondering if she should do a follow up in 6-8 weeks. Also wondering if she should do a lab only or make an appointment with you.     Ruth Blanchard LPN on 9/28/2021 at 8:49 AM

## 2021-09-28 NOTE — TELEPHONE ENCOUNTER
Called and spoke with the patient and gave her the below information  Ruth Blanchard LPN on 9/28/2021 at 10:45 AM

## 2021-09-28 NOTE — TELEPHONE ENCOUNTER
Yes, I will order future labs if she would like to do a lab only appointment.  She will need to be fasting.

## 2021-10-14 ENCOUNTER — HOSPITAL ENCOUNTER (OUTPATIENT)
Dept: BONE DENSITY | Facility: OTHER | Age: 71
Discharge: HOME | End: 2021-10-14
Attending: NURSE PRACTITIONER | Admitting: NURSE PRACTITIONER
Payer: MEDICARE

## 2021-10-14 DIAGNOSIS — Z78.0 POSTMENOPAUSAL STATUS: ICD-10-CM

## 2021-10-14 PROCEDURE — 77080 DXA BONE DENSITY AXIAL: CPT

## 2021-11-10 ENCOUNTER — LAB (OUTPATIENT)
Dept: LAB | Facility: OTHER | Age: 71
End: 2021-11-10
Attending: NURSE PRACTITIONER
Payer: MEDICARE

## 2021-11-10 DIAGNOSIS — E78.2 MIXED HYPERLIPIDEMIA: ICD-10-CM

## 2021-11-10 LAB
AST SERPL W P-5'-P-CCNC: 16 U/L (ref 13–39)
CHOLEST SERPL-MCNC: 155 MG/DL
FASTING STATUS PATIENT QL REPORTED: YES
HDLC SERPL-MCNC: 45 MG/DL (ref 23–92)
LDLC SERPL CALC-MCNC: 89 MG/DL
NONHDLC SERPL-MCNC: 110 MG/DL
TRIGL SERPL-MCNC: 103 MG/DL

## 2021-11-10 PROCEDURE — 82465 ASSAY BLD/SERUM CHOLESTEROL: CPT | Mod: ZL

## 2021-11-10 PROCEDURE — 36415 COLL VENOUS BLD VENIPUNCTURE: CPT | Mod: ZL

## 2021-11-10 PROCEDURE — 84450 TRANSFERASE (AST) (SGOT): CPT | Mod: ZL

## 2021-12-22 ENCOUNTER — HOSPITAL ENCOUNTER (OUTPATIENT)
Dept: MAMMOGRAPHY | Facility: OTHER | Age: 71
Discharge: HOME | End: 2021-12-22
Attending: NURSE PRACTITIONER | Admitting: NURSE PRACTITIONER
Payer: MEDICARE

## 2021-12-22 DIAGNOSIS — Z12.31 VISIT FOR SCREENING MAMMOGRAM: ICD-10-CM

## 2021-12-22 PROCEDURE — 77063 BREAST TOMOSYNTHESIS BI: CPT

## 2022-06-27 ENCOUNTER — OFFICE VISIT (OUTPATIENT)
Dept: FAMILY MEDICINE | Facility: OTHER | Age: 72
End: 2022-06-27
Attending: FAMILY MEDICINE
Payer: MEDICARE

## 2022-06-27 VITALS
WEIGHT: 176.4 LBS | HEART RATE: 76 BPM | OXYGEN SATURATION: 97 % | TEMPERATURE: 97.3 F | DIASTOLIC BLOOD PRESSURE: 88 MMHG | SYSTOLIC BLOOD PRESSURE: 142 MMHG | BODY MASS INDEX: 28.35 KG/M2 | RESPIRATION RATE: 16 BRPM

## 2022-06-27 DIAGNOSIS — R79.9 ABNORMAL FINDING OF BLOOD CHEMISTRY, UNSPECIFIED: ICD-10-CM

## 2022-06-27 DIAGNOSIS — R60.0 BILATERAL LEG EDEMA: Primary | ICD-10-CM

## 2022-06-27 DIAGNOSIS — E55.9 VITAMIN D DEFICIENCY: ICD-10-CM

## 2022-06-27 DIAGNOSIS — R20.2 BILATERAL LEG PARESTHESIA: ICD-10-CM

## 2022-06-27 LAB
ALBUMIN SERPL-MCNC: 4.5 G/DL (ref 3.5–5.7)
ALBUMIN UR-MCNC: NEGATIVE MG/DL
ALP SERPL-CCNC: 64 U/L (ref 34–104)
ALT SERPL W P-5'-P-CCNC: 18 U/L (ref 7–52)
ANION GAP SERPL CALCULATED.3IONS-SCNC: 8 MMOL/L (ref 3–14)
APPEARANCE UR: CLEAR
AST SERPL W P-5'-P-CCNC: 18 U/L (ref 13–39)
BILIRUB SERPL-MCNC: 0.6 MG/DL (ref 0.3–1)
BILIRUB UR QL STRIP: NEGATIVE
BUN SERPL-MCNC: 19 MG/DL (ref 7–25)
CALCIUM SERPL-MCNC: 9.8 MG/DL (ref 8.6–10.3)
CHLORIDE BLD-SCNC: 107 MMOL/L (ref 98–107)
CK SERPL-CCNC: 97 U/L (ref 30–223)
CO2 SERPL-SCNC: 27 MMOL/L (ref 21–31)
COLOR UR AUTO: ABNORMAL
CREAT SERPL-MCNC: 0.94 MG/DL (ref 0.6–1.2)
DEPRECATED CALCIDIOL+CALCIFEROL SERPL-MC: 41 UG/L (ref 30–100)
ERYTHROCYTE [DISTWIDTH] IN BLOOD BY AUTOMATED COUNT: 14.5 % (ref 10–15)
ERYTHROCYTE [SEDIMENTATION RATE] IN BLOOD BY WESTERGREN METHOD: 5 MM/HR (ref 0–30)
GFR SERPL CREATININE-BSD FRML MDRD: 65 ML/MIN/1.73M2
GLUCOSE BLD-MCNC: 102 MG/DL (ref 70–105)
GLUCOSE UR STRIP-MCNC: NEGATIVE MG/DL
HBA1C MFR BLD: 5.7 % (ref 4–6.2)
HCT VFR BLD AUTO: 45.4 % (ref 35–47)
HGB BLD-MCNC: 14.8 G/DL (ref 11.7–15.7)
HGB UR QL STRIP: ABNORMAL
KETONES UR STRIP-MCNC: 10 MG/DL
LEUKOCYTE ESTERASE UR QL STRIP: NEGATIVE
MCH RBC QN AUTO: 28.8 PG (ref 26.5–33)
MCHC RBC AUTO-ENTMCNC: 32.6 G/DL (ref 31.5–36.5)
MCV RBC AUTO: 89 FL (ref 78–100)
MUCOUS THREADS #/AREA URNS LPF: PRESENT /LPF
NITRATE UR QL: NEGATIVE
PH UR STRIP: 6 [PH] (ref 5–9)
PLATELET # BLD AUTO: 272 10E3/UL (ref 150–450)
POTASSIUM BLD-SCNC: 3.9 MMOL/L (ref 3.5–5.1)
PROT SERPL-MCNC: 7.1 G/DL (ref 6.4–8.9)
RBC # BLD AUTO: 5.13 10E6/UL (ref 3.8–5.2)
RBC URINE: 6 /HPF
SODIUM SERPL-SCNC: 142 MMOL/L (ref 134–144)
SP GR UR STRIP: 1.02 (ref 1–1.03)
TOTAL PROTEIN SERUM FOR ELP: 6.8 G/DL (ref 6.4–8.3)
TSH SERPL DL<=0.005 MIU/L-ACNC: 2.93 MU/L (ref 0.4–4)
UROBILINOGEN UR STRIP-MCNC: NORMAL MG/DL
VIT B12 SERPL-MCNC: >1500 PG/ML (ref 180–914)
WBC # BLD AUTO: 7.1 10E3/UL (ref 4–11)
WBC URINE: <1 /HPF

## 2022-06-27 PROCEDURE — 85027 COMPLETE CBC AUTOMATED: CPT | Mod: ZL | Performed by: FAMILY MEDICINE

## 2022-06-27 PROCEDURE — 84155 ASSAY OF PROTEIN SERUM: CPT | Mod: ZL | Performed by: FAMILY MEDICINE

## 2022-06-27 PROCEDURE — 83036 HEMOGLOBIN GLYCOSYLATED A1C: CPT | Mod: ZL | Performed by: FAMILY MEDICINE

## 2022-06-27 PROCEDURE — 82607 VITAMIN B-12: CPT | Mod: ZL | Performed by: FAMILY MEDICINE

## 2022-06-27 PROCEDURE — G0463 HOSPITAL OUTPT CLINIC VISIT: HCPCS

## 2022-06-27 PROCEDURE — 82550 ASSAY OF CK (CPK): CPT | Mod: ZL | Performed by: FAMILY MEDICINE

## 2022-06-27 PROCEDURE — 84443 ASSAY THYROID STIM HORMONE: CPT | Mod: ZL | Performed by: FAMILY MEDICINE

## 2022-06-27 PROCEDURE — 99214 OFFICE O/P EST MOD 30 MIN: CPT | Performed by: FAMILY MEDICINE

## 2022-06-27 PROCEDURE — 85652 RBC SED RATE AUTOMATED: CPT | Mod: ZL | Performed by: FAMILY MEDICINE

## 2022-06-27 PROCEDURE — 80053 COMPREHEN METABOLIC PANEL: CPT | Mod: ZL | Performed by: FAMILY MEDICINE

## 2022-06-27 PROCEDURE — 82306 VITAMIN D 25 HYDROXY: CPT | Mod: ZL | Performed by: FAMILY MEDICINE

## 2022-06-27 PROCEDURE — 36415 COLL VENOUS BLD VENIPUNCTURE: CPT | Mod: ZL | Performed by: FAMILY MEDICINE

## 2022-06-27 PROCEDURE — 84165 PROTEIN E-PHORESIS SERUM: CPT | Mod: ZL | Performed by: PATHOLOGY

## 2022-06-27 PROCEDURE — 81001 URINALYSIS AUTO W/SCOPE: CPT | Mod: ZL | Performed by: FAMILY MEDICINE

## 2022-06-27 RX ORDER — CHOLECALCIFEROL (VITAMIN D3) 50 MCG
1 TABLET ORAL DAILY
COMMUNITY
Start: 2022-06-27 | End: 2023-10-20

## 2022-06-27 ASSESSMENT — ENCOUNTER SYMPTOMS: NUMBNESS: 1

## 2022-06-27 ASSESSMENT — PAIN SCALES - GENERAL: PAINLEVEL: MODERATE PAIN (5)

## 2022-06-27 NOTE — NURSING NOTE
"Chief Complaint   Patient presents with     Leg Swelling     Numbness     Patient is here for leg swelling, pain, and numbness     Initial BP (!) 142/98   Pulse 76   Temp 97.3  F (36.3  C) (Tympanic)   Resp 16   Wt 80 kg (176 lb 6.4 oz)   LMP  (LMP Unknown)   SpO2 97%   Breastfeeding No   BMI 28.35 kg/m   Estimated body mass index is 28.35 kg/m  as calculated from the following:    Height as of 9/17/21: 1.68 m (5' 6.14\").    Weight as of this encounter: 80 kg (176 lb 6.4 oz).  Medication Reconciliation: complete    Monet Denny MA       FOOD SECURITY SCREENING QUESTIONS:    The next two questions are to help us understand your food security.  If you are feeling you need any assistance in this area, we have resources available to support you today.    Hunger Vital Signs:  Within the past 12 months we worried whether our food would run out before we got money to buy more. Never  Within the past 12 months the food we bought just didn't last and we didn't have money to get more. Never  Monet Denny MA,LPN on 6/27/2022 at 11:40 AM      "

## 2022-06-27 NOTE — PROGRESS NOTES
"  Assessment & Plan       ICD-10-CM    1. Bilateral leg edema  R60.0 CK Total     TSH     Vitamin B12     Comprehensive Metabolic Panel     Protein electrophoresis     Hemoglobin A1c     Vitamin D Total     CBC W PLT No Diff     Sedimentation Rate (ESR)     EMG     UA reflex to Microscopic     Sedimentation Rate (ESR)     CBC W PLT No Diff     Vitamin D Total     Hemoglobin A1c     Protein electrophoresis     Comprehensive Metabolic Panel     Vitamin B12     TSH     CK Total     CANCELED: UA reflex to Microscopic     CANCELED: UA reflex to Microscopic   2. Bilateral leg paresthesia  R20.2 CK Total     TSH     Vitamin B12     Comprehensive Metabolic Panel     Protein electrophoresis     Hemoglobin A1c     Vitamin D Total     CBC W PLT No Diff     Sedimentation Rate (ESR)     EMG     UA reflex to Microscopic     Sedimentation Rate (ESR)     CBC W PLT No Diff     Vitamin D Total     Hemoglobin A1c     Protein electrophoresis     Comprehensive Metabolic Panel     Vitamin B12     TSH     CK Total     CANCELED: UA reflex to Microscopic     CANCELED: UA reflex to Microscopic   3. Abnormal finding of blood chemistry, unspecified   R79.9 Hemoglobin A1c     UA reflex to Microscopic     Hemoglobin A1c   4. Vitamin D deficiency  E55.9 vitamin D3 (CHOLECALCIFEROL) 50 mcg (2000 units) tablet     Vitamin D Total     UA reflex to Microscopic     Vitamin D Total     1. Differential diagnosis of her symptoms discussed with her and her daughter.  This includes metabolic, degenerative, inflammatory, primary neuropathy, secondary neuropathy.  Conditions such as MS and ALS discussed.  Pending remainder of labs, EMG is scheduled.  No treatment started at this time.     Review of the result(s) of each unique test - EMG, labs  Ordering of each unique test         BMI:   Estimated body mass index is 28.35 kg/m  as calculated from the following:    Height as of 9/17/21: 1.68 m (5' 6.14\").    Weight as of this encounter: 80 kg (176 lb 6.4 oz). "       No follow-ups on file.    ARANZA FORTUNE MD  [facility]    Subjective   Libby is a 71 year old, presenting for the following health issues:  Leg Swelling and Numbness  Patient is in with her daughter   She had the onset about a month ago.  Her first symptom was swelling, this is from the knee down.  That seems to be lessened over the past 2 days.  However, the numbness symptoms has become more persistent.    History of vitamin B12 deficiency.  States most recent labs related to this have been normal.  She can't walk far until her legs get numb.  This is a sensation of not being able to feel.    Has been told she had the start of carpal tunnel.    No new medication this past month - neither any that she stopped.  She feels that her balance is a bit off.    She lost quite a bit of weight this year following the death of her .      Numbness  Associated symptoms include numbness.   History of Present Illness       Reason for visit:  Leg swelling and pain  Symptom onset:  More than a month  Symptoms include:  Numbness  Symptom intensity:  Moderate  Symptom progression:  Worsening  Had these symptoms before:  Yes  Has tried/received treatment for these symptoms:  No  What makes it worse:  No  What makes it better:  Rest    She eats 2-3 servings of fruits and vegetables daily.She consumes 1 sweetened beverage(s) daily.She exercises with enough effort to increase her heart rate 20 to 29 minutes per day.  She exercises with enough effort to increase her heart rate 3 or less days per week.   She is taking medications regularly.         Current Outpatient Medications   Medication     aspirin (ASA) 81 MG EC tablet     atorvastatin (LIPITOR) 10 MG tablet     Calcium Polycarbophil (FIBER) 625 MG tablet     cyanocobalamin (VITAMIN B-12) 1000 MCG tablet     MAGNESIUM PO     vitamin D3 (CHOLECALCIFEROL) 50 mcg (2000 units) tablet     No current facility-administered medications for  this visit.     Past Medical History:   Diagnosis Date     Asymptomatic menopausal state     approximately age 47         Review of Systems   Neurological: Positive for numbness.            Objective    BP (!) 142/98   Pulse 76   Temp 97.3  F (36.3  C) (Tympanic)   Resp 16   Wt 80 kg (176 lb 6.4 oz)   LMP  (LMP Unknown)   SpO2 97%   Breastfeeding No   BMI 28.35 kg/m    Body mass index is 28.35 kg/m .   Wt Readings from Last 4 Encounters:   06/27/22 80 kg (176 lb 6.4 oz)   09/17/21 78.2 kg (172 lb 6.4 oz)   11/26/19 78.3 kg (172 lb 9.6 oz)   10/01/19 76.3 kg (168 lb 3.2 oz)       Physical Exam   GENERAL: healthy, alert and no distress  Bilateral  foot exam: normal DP and PT pulses, no trophic changes or ulcerative lesions, normal sensory exam and normal monofilament exam  Tr ankle edema  Left patellar reflex absent, tr on the right     Results for orders placed or performed in visit on 06/27/22   UA reflex to Microscopic     Status: Abnormal   Result Value Ref Range    Color Urine Light Yellow Colorless, Straw, Light Yellow, Yellow    Appearance Urine Clear Clear    Glucose Urine Negative Negative mg/dL    Bilirubin Urine Negative Negative    Ketones Urine 10  (A) Negative mg/dL    Specific Gravity Urine 1.020 1.000 - 1.030    Blood Urine Trace (A) Negative    pH Urine 6.0 5.0 - 9.0    Protein Albumin Urine Negative Negative mg/dL    Urobilinogen Urine Normal Normal, 2.0 mg/dL    Nitrite Urine Negative Negative    Leukocyte Esterase Urine Negative Negative    RBC Urine 6 (H) <=2 /HPF    WBC Urine <1 <=5 /HPF    Mucus Urine Present (A) None Seen /LPF   Sedimentation Rate (ESR)     Status: Normal   Result Value Ref Range    Erythrocyte Sedimentation Rate 5 0 - 30 mm/hr   CBC W PLT No Diff     Status: Normal   Result Value Ref Range    WBC Count 7.1 4.0 - 11.0 10e3/uL    RBC Count 5.13 3.80 - 5.20 10e6/uL    Hemoglobin 14.8 11.7 - 15.7 g/dL    Hematocrit 45.4 35.0 - 47.0 %    MCV 89 78 - 100 fL    MCH 28.8 26.5  - 33.0 pg    MCHC 32.6 31.5 - 36.5 g/dL    RDW 14.5 10.0 - 15.0 %    Platelet Count 272 150 - 450 10e3/uL   Vitamin D Total     Status: Normal   Result Value Ref Range    Vitamin D, Total (25-Hydroxy) 41 30 - 100 ug/L   Hemoglobin A1c     Status: Normal   Result Value Ref Range    Hemoglobin A1C 5.7 4.0 - 6.2 %   Comprehensive Metabolic Panel     Status: Normal   Result Value Ref Range    Sodium 142 134 - 144 mmol/L    Potassium 3.9 3.5 - 5.1 mmol/L    Chloride 107 98 - 107 mmol/L    Carbon Dioxide (CO2) 27 21 - 31 mmol/L    Anion Gap 8 3 - 14 mmol/L    Urea Nitrogen 19 7 - 25 mg/dL    Creatinine 0.94 0.60 - 1.20 mg/dL    Calcium 9.8 8.6 - 10.3 mg/dL    Glucose 102 70 - 105 mg/dL    Alkaline Phosphatase 64 34 - 104 U/L    AST 18 13 - 39 U/L    ALT 18 7 - 52 U/L    Protein Total 7.1 6.4 - 8.9 g/dL    Albumin 4.5 3.5 - 5.7 g/dL    Bilirubin Total 0.6 0.3 - 1.0 mg/dL    GFR Estimate 65 >60 mL/min/1.73m2   Vitamin B12     Status: Abnormal   Result Value Ref Range    Vitamin B12 >1,500 (H) 180 - 914 pg/mL   TSH     Status: Normal   Result Value Ref Range    TSH 2.93 0.40 - 4.00 mU/L   CK Total     Status: Normal   Result Value Ref Range    CK 97 30 - 223 U/L   Protein electrophoresis     Status: None (In process)    Narrative    The following orders were created for panel order Protein electrophoresis.  Procedure                               Abnormality         Status                     ---------                               -----------         ------                     Total Protein, Serum for...[[id number]]                      In process                 Protein Electrophoresis,...[[id number]]                      In process                   Please view results for these tests on the individual orders.                   .  ..

## 2022-06-28 LAB
ALBUMIN SERPL ELPH-MCNC: 4.4 G/DL (ref 3.7–5.1)
ALPHA1 GLOB SERPL ELPH-MCNC: 0.3 G/DL (ref 0.2–0.4)
ALPHA2 GLOB SERPL ELPH-MCNC: 0.7 G/DL (ref 0.5–0.9)
B-GLOBULIN SERPL ELPH-MCNC: 0.7 G/DL (ref 0.6–1)
GAMMA GLOB SERPL ELPH-MCNC: 0.8 G/DL (ref 0.7–1.6)
M PROTEIN SERPL ELPH-MCNC: 0 G/DL
PROT PATTERN SERPL ELPH-IMP: NORMAL

## 2022-06-28 PROCEDURE — 84165 PROTEIN E-PHORESIS SERUM: CPT | Mod: 26 | Performed by: PATHOLOGY

## 2022-07-21 ENCOUNTER — OFFICE VISIT (OUTPATIENT)
Dept: NEUROLOGY | Facility: OTHER | Age: 72
End: 2022-07-21
Attending: PSYCHIATRY & NEUROLOGY
Payer: MEDICARE

## 2022-07-21 VITALS
WEIGHT: 176.4 LBS | SYSTOLIC BLOOD PRESSURE: 138 MMHG | HEART RATE: 48 BPM | DIASTOLIC BLOOD PRESSURE: 82 MMHG | TEMPERATURE: 97.6 F | RESPIRATION RATE: 12 BRPM | HEIGHT: 66 IN | OXYGEN SATURATION: 100 % | BODY MASS INDEX: 28.35 KG/M2

## 2022-07-21 DIAGNOSIS — M79.661 PAIN IN BOTH LOWER LEGS: Primary | ICD-10-CM

## 2022-07-21 DIAGNOSIS — M79.662 PAIN IN BOTH LOWER LEGS: Primary | ICD-10-CM

## 2022-07-21 DIAGNOSIS — G58.8 OTHER MONONEUROPATHY: ICD-10-CM

## 2022-07-21 PROCEDURE — 95912 NRV CNDJ TEST 11-12 STUDIES: CPT | Mod: 26 | Performed by: PSYCHIATRY & NEUROLOGY

## 2022-07-21 PROCEDURE — G0463 HOSPITAL OUTPT CLINIC VISIT: HCPCS | Performed by: PSYCHIATRY & NEUROLOGY

## 2022-07-21 PROCEDURE — 95886 MUSC TEST DONE W/N TEST COMP: CPT | Performed by: PSYCHIATRY & NEUROLOGY

## 2022-07-21 PROCEDURE — 99204 OFFICE O/P NEW MOD 45 MIN: CPT | Mod: 25 | Performed by: PSYCHIATRY & NEUROLOGY

## 2022-07-21 PROCEDURE — 95886 MUSC TEST DONE W/N TEST COMP: CPT | Mod: 26 | Performed by: PSYCHIATRY & NEUROLOGY

## 2022-07-21 PROCEDURE — 95912 NRV CNDJ TEST 11-12 STUDIES: CPT | Performed by: PSYCHIATRY & NEUROLOGY

## 2022-07-21 ASSESSMENT — PAIN SCALES - GENERAL: PAINLEVEL: NO PAIN (0)

## 2022-07-21 NOTE — LETTER
7/21/2022         RE: Libby Elliott  [address]  [place] [address]        Dear Colleague,    Thank you for referring your patient, Libby Elliott, to the [facility]. Please see a copy of my visit note below.    Visit Date: 07/21/2022    REFERRING PHYSICIAN:  Amna Barney MD.    HISTORY OF PRESENT ILLNESS:  The patient relates a history of recurrent sensory loss extending from the right foot up the anterior aspect of the right leg to the knee.  This has been present for at least a year.  She occasionally has milder sensory symptoms on the left side.  She has not appreciated any loss of strength in the lower extremities, she does not describe radicular quality pain, but she does have some chronic low back pain.  There has been no obvious injury or precipitating event.    PAST MEDICAL HISTORY:  Significant for B12 deficiency.  The patient has been on B12 replacement with a therapeutic level for about three years.  Her current lower extremity symptoms did not begin until about a year after she started taking B12.    REVIEW OF SYSTEMS:  Ten-system review of systems, other than the above, is negative.    FAMILY HISTORY:  Negative for neuropathy.    PHYSICAL EXAMINATION:  The patient is 65 inches tall and weighs 176 pounds.  She is afebrile.  She shows normal strength for the dorsi and plantar flexors of the feet, quadriceps, and hamstrings.  Reflexes are hypoactive, at the knees and ankles.  Vibratory perception is well within normal limits with extinction of a 128 Hz tuning fork at 15 seconds for each ankle.  Gait is normal.  There is no foot drop.    NERVE CONDUCTION STUDIES:  Motor nerve conduction testing was performed bilaterally for the peroneal and tibial nerves.  There was latency prolongation for the right peroneal nerve with amplitude reduction of the waveforms.  Conduction velocities and H reflexes were normal throughout.    Antidromic sensory nerve  conduction studies were performed for the bilateral sural and peroneal nerves.  No waveform could be obtained for the right peroneal nerve, but the other tested nerves, the latencies, amplitudes and conduction velocities were normal.    MONOPOLAR EMG NEEDLE EXAMINATION:  Monopolar EMG needle examination was performed bilaterally for the vastus lateralis, tibialis anterior, gastrocnemius, extensor hallucis longus, and peroneus brevis.  All of the tested muscles showed normal insertional activity.  Motor units were normal in size with normal recruitment and interference.    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  This is a relatively innocuous condition, but it probably does explain most of the patient's symptoms.  There is no evidence for this problem on the left.  The importance of avoiding pressure to the anterior ankle and dorsum of the foot was emphasized to the patient.  There is no evidence for other focal neuropathy and the patient does not have evidence for radiculopathy.    Balwinder Julian MD        D: 2022   T: 2022   MT: EMILY    Name:     JOSE APPLE  MRN:      [id number]        Account:    [id number]   :      1950           Visit Date: 2022     Document: [id number]      Again, thank you for allowing me to participate in the care of your patient.        Sincerely,        Balwinder Julian MD

## 2022-07-21 NOTE — PROGRESS NOTES
Visit Date: 07/21/2022    REFERRING PHYSICIAN:  Amna Barney MD.    HISTORY OF PRESENT ILLNESS:  The patient relates a history of recurrent sensory loss extending from the right foot up the anterior aspect of the right leg to the knee.  This has been present for at least a year.  She occasionally has milder sensory symptoms on the left side.  She has not appreciated any loss of strength in the lower extremities, she does not describe radicular quality pain, but she does have some chronic low back pain.  There has been no obvious injury or precipitating event.    PAST MEDICAL HISTORY:  Significant for B12 deficiency.  The patient has been on B12 replacement with a therapeutic level for about three years.  Her current lower extremity symptoms did not begin until about a year after she started taking B12.    REVIEW OF SYSTEMS:  Ten-system review of systems, other than the above, is negative.    FAMILY HISTORY:  Negative for neuropathy.    PHYSICAL EXAMINATION:  The patient is 65 inches tall and weighs 176 pounds.  She is afebrile.  She shows normal strength for the dorsi and plantar flexors of the feet, quadriceps, and hamstrings.  Reflexes are hypoactive, at the knees and ankles.  Vibratory perception is well within normal limits with extinction of a 128 Hz tuning fork at 15 seconds for each ankle.  Gait is normal.  There is no foot drop.    NERVE CONDUCTION STUDIES:  Motor nerve conduction testing was performed bilaterally for the peroneal and tibial nerves.  There was latency prolongation for the right peroneal nerve with amplitude reduction of the waveforms.  Conduction velocities and H reflexes were normal throughout.    Antidromic sensory nerve conduction studies were performed for the bilateral sural and peroneal nerves.  No waveform could be obtained for the right peroneal nerve, but the other tested nerves, the latencies, amplitudes and conduction velocities were normal.    MONOPOLAR EMG NEEDLE  EXAMINATION:  Monopolar EMG needle examination was performed bilaterally for the vastus lateralis, tibialis anterior, gastrocnemius, extensor hallucis longus, and peroneus brevis.  All of the tested muscles showed normal insertional activity.  Motor units were normal in size with normal recruitment and interference.    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  This is a relatively innocuous condition, but it probably does explain most of the patient's symptoms.  There is no evidence for this problem on the left.  The importance of avoiding pressure to the anterior ankle and dorsum of the foot was emphasized to the patient.  There is no evidence for other focal neuropathy and the patient does not have evidence for radiculopathy.    Balwinder Julian MD        D: 2022   T: 2022   MT: EMILY    Name:     JOSE APPLE  MRN:      [id number]        Account:    [id number]   :      1950           Visit Date: 2022     Document: [id number]

## 2022-07-21 NOTE — NURSING NOTE
Patient presents to clinic for Edema Paresthesia.  Melissa Byers LPN ....................  7/21/2022   1:10 PM

## 2022-09-09 ENCOUNTER — OFFICE VISIT (OUTPATIENT)
Dept: FAMILY MEDICINE | Facility: OTHER | Age: 72
End: 2022-09-09
Attending: FAMILY MEDICINE
Payer: MEDICARE

## 2022-09-09 VITALS
RESPIRATION RATE: 16 BRPM | TEMPERATURE: 97.8 F | HEART RATE: 69 BPM | WEIGHT: 180.6 LBS | SYSTOLIC BLOOD PRESSURE: 136 MMHG | BODY MASS INDEX: 29.15 KG/M2 | DIASTOLIC BLOOD PRESSURE: 84 MMHG | OXYGEN SATURATION: 99 %

## 2022-09-09 DIAGNOSIS — E78.2 MIXED HYPERLIPIDEMIA: ICD-10-CM

## 2022-09-09 DIAGNOSIS — R20.2 BILATERAL LEG PARESTHESIA: ICD-10-CM

## 2022-09-09 DIAGNOSIS — R60.0 BILATERAL LEG EDEMA: ICD-10-CM

## 2022-09-09 DIAGNOSIS — G57.01 COMMON PERONEAL NEUROPATHY OF RIGHT LOWER EXTREMITY: Primary | ICD-10-CM

## 2022-09-09 LAB
CHOLEST SERPL-MCNC: 152 MG/DL
FASTING STATUS PATIENT QL REPORTED: NO
HDLC SERPL-MCNC: 52 MG/DL (ref 23–92)
LDLC SERPL CALC-MCNC: 81 MG/DL
NONHDLC SERPL-MCNC: 100 MG/DL
TRIGL SERPL-MCNC: 93 MG/DL

## 2022-09-09 PROCEDURE — G0463 HOSPITAL OUTPT CLINIC VISIT: HCPCS

## 2022-09-09 PROCEDURE — 36415 COLL VENOUS BLD VENIPUNCTURE: CPT | Mod: ZL | Performed by: FAMILY MEDICINE

## 2022-09-09 PROCEDURE — 80061 LIPID PANEL: CPT | Mod: ZL | Performed by: FAMILY MEDICINE

## 2022-09-09 PROCEDURE — 99213 OFFICE O/P EST LOW 20 MIN: CPT | Performed by: FAMILY MEDICINE

## 2022-09-09 RX ORDER — ATORVASTATIN CALCIUM 10 MG/1
10 TABLET, FILM COATED ORAL DAILY
Qty: 90 TABLET | Refills: 3 | Status: SHIPPED | OUTPATIENT
Start: 2022-09-09 | End: 2023-08-16

## 2022-09-09 ASSESSMENT — PATIENT HEALTH QUESTIONNAIRE - PHQ9
10. IF YOU CHECKED OFF ANY PROBLEMS, HOW DIFFICULT HAVE THESE PROBLEMS MADE IT FOR YOU TO DO YOUR WORK, TAKE CARE OF THINGS AT HOME, OR GET ALONG WITH OTHER PEOPLE: NOT DIFFICULT AT ALL
SUM OF ALL RESPONSES TO PHQ QUESTIONS 1-9: 3
SUM OF ALL RESPONSES TO PHQ QUESTIONS 1-9: 3

## 2022-09-09 ASSESSMENT — PAIN SCALES - GENERAL: PAINLEVEL: MODERATE PAIN (4)

## 2022-09-09 NOTE — NURSING NOTE
"Chief Complaint   Patient presents with     Results     EMG done in July      Patient is here to discuss the results of her EMG that was done in July.     Initial /84   Pulse 69   Temp 97.8  F (36.6  C) (Tympanic)   Resp 16   Wt 81.9 kg (180 lb 9.6 oz)   LMP  (LMP Unknown)   SpO2 99%   Breastfeeding No   BMI 29.15 kg/m   Estimated body mass index is 29.15 kg/m  as calculated from the following:    Height as of 7/21/22: 1.676 m (5' 6\").    Weight as of this encounter: 81.9 kg (180 lb 9.6 oz).  Medication Reconciliation: complete    Monet Denny MA       FOOD SECURITY SCREENING QUESTIONS:    The next two questions are to help us understand your food security.  If you are feeling you need any assistance in this area, we have resources available to support you today.    Hunger Vital Signs:  Within the past 12 months we worried whether our food would run out before we got money to buy more. Never  Within the past 12 months the food we bought just didn't last and we didn't have money to get more. Never  Monet Denny MA,LPN on 9/9/2022 at 11:55 AM      "

## 2022-09-09 NOTE — PROGRESS NOTES
"  Assessment & Plan       ICD-10-CM    1. 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 Bilateral leg paresthesia  R20.2    3. Bilateral leg edema  R60.0    4. Mixed hyperlipidemia  E78.2 atorvastatin (LIPITOR) 10 MG tablet     Lipid Profile     Lipid Profile     1..I have personally reviewed the labs listed below.  2.  Reviewed EMG and current exam findings.  Initially, there was some concern that her numbness and tingling was related to B12 or other metabolic disorder or more progressive neurologic disorder.  EMG findings are localized peripheral nerve findings.  If symptoms persist/worsen, could consider additional imaging of her ankle to see if there is a surgical correction for her symptoms.  We also discussed some contrasting, heat/cold exposure, that can help with her symptoms.  3.  Continue same dose of statin therapy.    Review of the result(s) of each unique test - EMG  Ordering of each unique test  Prescription drug management         BMI:   Estimated body mass index is 29.15 kg/m  as calculated from the following:    Height as of 7/21/22: 1.676 m (5' 6\").    Weight as of this encounter: 81.9 kg (180 lb 9.6 oz).           Return if symptoms worsen or fail to improve.    ARANZA FORTUNE MD  [facility]    Subjective   Libby is a 71 year old, presenting for the following health issues:  Results (EMG done in July )  Libby Elliott is a 71 year old female in for follow up after her EMG.  This was done in evaluation of lower extremity tingling.  Since her visit with neurology, the symptoms in her right foot have gotten somewhat better, have not completely resolved.  She is making a conscious effort not to put pressure on/compress that nerve, such as not crossing her ankles.    Hyperlipidemia: She states she is in need of refill of her statin medication.  Last time she had her lipids checked was about 11 months ago.    History of Present Illness       Reason " for visit:  Follow up    She eats 2-3 servings of fruits and vegetables daily.She consumes 1 sweetened beverage(s) daily.She exercises with enough effort to increase her heart rate 20 to 29 minutes per day.  She exercises with enough effort to increase her heart rate 3 or less days per week.   She is taking medications regularly.    Today's PHQ-9         PHQ-9 Total Score: 3    PHQ-9 Q9 Thoughts of better off dead/self-harm past 2 weeks :   Not at all    How difficult have these problems made it for you to do your work, take care of things at home, or get along with other people: Not difficult at all         Current Outpatient Medications   Medication     aspirin (ASA) 81 MG EC tablet     atorvastatin (LIPITOR) 10 MG tablet     Calcium Polycarbophil (FIBER) 625 MG tablet     cyanocobalamin (VITAMIN B-12) 1000 MCG tablet     MAGNESIUM PO     vitamin D3 (CHOLECALCIFEROL) 50 mcg (2000 units) tablet     No current facility-administered medications for this visit.     Past Medical History:   Diagnosis Date     Asymptomatic menopausal state     approximately age 47     Past Surgical History:   Procedure Laterality Date     ANKLE SURGERY      achilles tendon repair and heel spur removal      SECTION      x three     COLONOSCOPY      08,Next colonoscopy due in 2018.     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         Review of Systems         Objective    /84   Pulse 69   Temp 97.8  F (36.6  C) (Tympanic)   Resp 16   Wt 81.9 kg (180 lb 9.6 oz)   LMP  (LMP Unknown)   SpO2 99%   Breastfeeding No   BMI 29.15 kg/m    Body mass index is 29.15 kg/m .  Physical Exam   GENERAL: healthy, alert and no distress  NEURO: Monofilament testing of right foot is normal.

## 2022-09-09 NOTE — LETTER
September 9, 2022      Libby K Earl  [address]  [place] [address]        Dear ,    We are writing to inform you of your test results.    Your test results fall within the expected range(s) or remain unchanged from previous results.  Please continue with current treatment plan.    Resulted Orders   Lipid Profile   Result Value Ref Range    Cholesterol 152 <200 mg/dL    Triglycerides 93 <150 mg/dL    Direct Measure HDL 52 23 - 92 mg/dL    LDL Cholesterol Calculated 81 <=100 mg/dL    Non HDL Cholesterol 100 <130 mg/dL    Patient Fasting > 8hrs? No     Narrative    Cholesterol  Desirable:  <200 mg/dL    Triglycerides  Normal:  Less than 150 mg/dL  Borderline High:  150-199 mg/dL  High:  200-499 mg/dL  Very High:  Greater than or equal to 500 mg/dL    Direct Measure HDL  Female:  Greater than or equal to 50 mg/dL   Male:  Greater than or equal to 40 mg/dL    LDL Cholesterol  Desirable:  <100mg/dL  Above Desirable:  100-129 mg/dL   Borderline High:  130-159 mg/dL   High:  160-189 mg/dL   Very High:  >= 190 mg/dL    Non HDL Cholesterol  Desirable:  130 mg/dL  Above Desirable:  130-159 mg/dL  Borderline High:  160-189 mg/dL  High:  190-219 mg/dL  Very High:  Greater than or equal to 220 mg/dL       If you have any questions or concerns, please call the clinic at the number listed above.       Sincerely,      Amna Barney MD

## 2022-09-10 DIAGNOSIS — E78.2 MIXED HYPERLIPIDEMIA: ICD-10-CM

## 2022-09-13 RX ORDER — ATORVASTATIN CALCIUM 10 MG/1
TABLET, FILM COATED ORAL
Qty: 90 TABLET | Refills: 0 | OUTPATIENT
Start: 2022-09-13

## 2022-09-13 NOTE — TELEPHONE ENCOUNTER
[facility] sent Rx request for the following:      Atorvastatin Calcium 10 MG Oral Tablet      Last Prescription Date:   9/9/2022  Last Fill Qty/Refills:         90, R-3        Prescription refused.  This is a duplicate request.  Ricki Gonzalez RN.......9/13/2022 10:40 AM

## 2023-01-03 ENCOUNTER — HOSPITAL ENCOUNTER (OUTPATIENT)
Dept: MAMMOGRAPHY | Facility: OTHER | Age: 73
Discharge: HOME | End: 2023-01-03
Attending: FAMILY MEDICINE | Admitting: FAMILY MEDICINE
Payer: MEDICARE

## 2023-01-03 DIAGNOSIS — Z12.31 VISIT FOR SCREENING MAMMOGRAM: ICD-10-CM

## 2023-01-03 PROCEDURE — 77067 SCR MAMMO BI INCL CAD: CPT

## 2023-08-16 ENCOUNTER — OFFICE VISIT (OUTPATIENT)
Dept: FAMILY MEDICINE | Facility: OTHER | Age: 73
End: 2023-08-16
Attending: FAMILY MEDICINE
Payer: MEDICARE

## 2023-08-16 VITALS
TEMPERATURE: 97.6 F | HEART RATE: 77 BPM | HEIGHT: 65 IN | DIASTOLIC BLOOD PRESSURE: 80 MMHG | SYSTOLIC BLOOD PRESSURE: 132 MMHG | RESPIRATION RATE: 14 BRPM | OXYGEN SATURATION: 96 % | BODY MASS INDEX: 30.26 KG/M2 | WEIGHT: 181.6 LBS

## 2023-08-16 DIAGNOSIS — Z12.11 COLON CANCER SCREENING: ICD-10-CM

## 2023-08-16 DIAGNOSIS — M16.12 PRIMARY OSTEOARTHRITIS OF LEFT HIP: ICD-10-CM

## 2023-08-16 DIAGNOSIS — Z23 NEED FOR DIPHTHERIA-TETANUS-PERTUSSIS (TDAP) VACCINE: ICD-10-CM

## 2023-08-16 DIAGNOSIS — R73.09 ELEVATED GLUCOSE: ICD-10-CM

## 2023-08-16 DIAGNOSIS — R20.2 BILATERAL LEG PARESTHESIA: ICD-10-CM

## 2023-08-16 DIAGNOSIS — E78.2 MIXED HYPERLIPIDEMIA: ICD-10-CM

## 2023-08-16 DIAGNOSIS — Z00.00 ENCOUNTER FOR MEDICARE ANNUAL WELLNESS EXAM: Primary | ICD-10-CM

## 2023-08-16 LAB
ALBUMIN SERPL BCG-MCNC: 4.4 G/DL (ref 3.5–5.2)
ALP SERPL-CCNC: 86 U/L (ref 35–104)
ALT SERPL W P-5'-P-CCNC: 37 U/L (ref 0–50)
ANION GAP SERPL CALCULATED.3IONS-SCNC: 8 MMOL/L (ref 7–15)
AST SERPL W P-5'-P-CCNC: 27 U/L (ref 0–45)
BILIRUB SERPL-MCNC: 0.6 MG/DL
BUN SERPL-MCNC: 20.2 MG/DL (ref 8–23)
CALCIUM SERPL-MCNC: 9.7 MG/DL (ref 8.8–10.2)
CHLORIDE SERPL-SCNC: 109 MMOL/L (ref 98–107)
CHOLEST SERPL-MCNC: 169 MG/DL
CREAT SERPL-MCNC: 0.95 MG/DL (ref 0.51–0.95)
DEPRECATED HCO3 PLAS-SCNC: 26 MMOL/L (ref 22–29)
ERYTHROCYTE [DISTWIDTH] IN BLOOD BY AUTOMATED COUNT: 14.7 % (ref 10–15)
GFR SERPL CREATININE-BSD FRML MDRD: 64 ML/MIN/1.73M2
GLUCOSE SERPL-MCNC: 105 MG/DL (ref 70–99)
HCT VFR BLD AUTO: 43.6 % (ref 35–47)
HDLC SERPL-MCNC: 55 MG/DL
HGB BLD-MCNC: 14.1 G/DL (ref 11.7–15.7)
LDLC SERPL CALC-MCNC: 99 MG/DL
MCH RBC QN AUTO: 28.5 PG (ref 26.5–33)
MCHC RBC AUTO-ENTMCNC: 32.3 G/DL (ref 31.5–36.5)
MCV RBC AUTO: 88 FL (ref 78–100)
NONHDLC SERPL-MCNC: 114 MG/DL
PLATELET # BLD AUTO: 259 10E3/UL (ref 150–450)
POTASSIUM SERPL-SCNC: 4.2 MMOL/L (ref 3.4–5.3)
PROT SERPL-MCNC: 6.9 G/DL (ref 6.4–8.3)
RBC # BLD AUTO: 4.94 10E6/UL (ref 3.8–5.2)
SODIUM SERPL-SCNC: 143 MMOL/L (ref 136–145)
TRIGL SERPL-MCNC: 75 MG/DL
WBC # BLD AUTO: 7.1 10E3/UL (ref 4–11)

## 2023-08-16 PROCEDURE — 80061 LIPID PANEL: CPT | Mod: ZL | Performed by: FAMILY MEDICINE

## 2023-08-16 PROCEDURE — G0463 HOSPITAL OUTPT CLINIC VISIT: HCPCS

## 2023-08-16 PROCEDURE — 83036 HEMOGLOBIN GLYCOSYLATED A1C: CPT | Mod: ZL | Performed by: FAMILY MEDICINE

## 2023-08-16 PROCEDURE — 36415 COLL VENOUS BLD VENIPUNCTURE: CPT | Mod: ZL | Performed by: FAMILY MEDICINE

## 2023-08-16 PROCEDURE — 80053 COMPREHEN METABOLIC PANEL: CPT | Mod: ZL | Performed by: FAMILY MEDICINE

## 2023-08-16 PROCEDURE — 85027 COMPLETE CBC AUTOMATED: CPT | Mod: ZL | Performed by: FAMILY MEDICINE

## 2023-08-16 PROCEDURE — 99213 OFFICE O/P EST LOW 20 MIN: CPT | Mod: 25 | Performed by: FAMILY MEDICINE

## 2023-08-16 PROCEDURE — G0439 PPPS, SUBSEQ VISIT: HCPCS | Performed by: FAMILY MEDICINE

## 2023-08-16 RX ORDER — ATORVASTATIN CALCIUM 10 MG/1
10 TABLET, FILM COATED ORAL DAILY
Qty: 90 TABLET | Refills: 3 | Status: SHIPPED | OUTPATIENT
Start: 2023-08-16 | End: 2024-08-07

## 2023-08-16 RX ORDER — TETANUS TOXOID, REDUCED DIPHTHERIA TOXOID AND ACELLULAR PERTUSSIS VACCINE, ADSORBED 5; 2.5; 8; 8; 2.5 [IU]/.5ML; [IU]/.5ML; UG/.5ML; UG/.5ML; UG/.5ML
0.5 SUSPENSION INTRAMUSCULAR ONCE
Qty: 0.5 ML | Refills: 0 | Status: SHIPPED | OUTPATIENT
Start: 2023-08-16 | End: 2023-08-16

## 2023-08-16 ASSESSMENT — ENCOUNTER SYMPTOMS
NERVOUS/ANXIOUS: 0
PALPITATIONS: 0
SHORTNESS OF BREATH: 0
EYE PAIN: 0
ARTHRALGIAS: 1
HEADACHES: 0
HEMATURIA: 0
WEAKNESS: 0
FEVER: 0
JOINT SWELLING: 0
PARESTHESIAS: 0
MYALGIAS: 0
BREAST MASS: 0
DYSURIA: 0
ABDOMINAL PAIN: 0
CONSTIPATION: 0
DIARRHEA: 0
SORE THROAT: 0
DIZZINESS: 0
HEMATOCHEZIA: 0
FREQUENCY: 0
HEARTBURN: 0
CHILLS: 0
NAUSEA: 0
COUGH: 0

## 2023-08-16 ASSESSMENT — PAIN SCALES - GENERAL: PAINLEVEL: EXTREME PAIN (8)

## 2023-08-16 ASSESSMENT — ACTIVITIES OF DAILY LIVING (ADL): CURRENT_FUNCTION: NO ASSISTANCE NEEDED

## 2023-08-16 NOTE — LETTER
August 18, 2023      Libby K Earl  [address] [address] [address]        Dear ,    We are writing to inform you of your test results.    Your test results fall within the expected range(s) or remain unchanged from previous results.  Please continue with current treatment plan.    Resulted Orders   CBC W PLT No Diff   Result Value Ref Range    WBC Count 7.1 4.0 - 11.0 10e3/uL    RBC Count 4.94 3.80 - 5.20 10e6/uL    Hemoglobin 14.1 11.7 - 15.7 g/dL    Hematocrit 43.6 35.0 - 47.0 %    MCV 88 78 - 100 fL    MCH 28.5 26.5 - 33.0 pg    MCHC 32.3 31.5 - 36.5 g/dL    RDW 14.7 10.0 - 15.0 %    Platelet Count 259 150 - 450 10e3/uL   Lipid Profile   Result Value Ref Range    Cholesterol 169 <200 mg/dL    Triglycerides 75 <150 mg/dL    Direct Measure HDL 55 >=50 mg/dL    LDL Cholesterol Calculated 99 <=100 mg/dL    Non HDL Cholesterol 114 <130 mg/dL    Narrative    Cholesterol  Desirable:  <200 mg/dL    Triglycerides  Normal:  Less than 150 mg/dL  Borderline High:  150-199 mg/dL  High:  200-499 mg/dL  Very High:  Greater than or equal to 500 mg/dL    Direct Measure HDL  Female:  Greater than or equal to 50 mg/dL   Male:  Greater than or equal to 40 mg/dL    LDL Cholesterol  Desirable:  <100mg/dL  Above Desirable:  100-129 mg/dL   Borderline High:  130-159 mg/dL   High:  160-189 mg/dL   Very High:  >= 190 mg/dL    Non HDL Cholesterol  Desirable:  130 mg/dL  Above Desirable:  130-159 mg/dL  Borderline High:  160-189 mg/dL  High:  190-219 mg/dL  Very High:  Greater than or equal to 220 mg/dL   Comprehensive Metabolic Panel   Result Value Ref Range    Sodium 143 136 - 145 mmol/L    Potassium 4.2 3.4 - 5.3 mmol/L    Chloride 109 (H) 98 - 107 mmol/L    Carbon Dioxide (CO2) 26 22 - 29 mmol/L    Anion Gap 8 7 - 15 mmol/L    Urea Nitrogen 20.2 8.0 - 23.0 mg/dL    Creatinine 0.95 0.51 - 0.95 mg/dL    Calcium 9.7 8.8 - 10.2 mg/dL    Glucose 105 (H) 70 - 99 mg/dL    Alkaline Phosphatase 86 35 - 104 U/L    AST 27 0  - 45 U/L      Comment:      Reference intervals for this test were updated on 6/12/2023 to more accurately reflect our healthy population. There may be differences in the flagging of prior results with similar values performed with this method. Interpretation of those prior results can be made in the context of the updated reference intervals.    ALT 37 0 - 50 U/L      Comment:      Reference intervals for this test were updated on 6/12/2023 to more accurately reflect our healthy population. There may be differences in the flagging of prior results with similar values performed with this method. Interpretation of those prior results can be made in the context of the updated reference intervals.      Protein Total 6.9 6.4 - 8.3 g/dL    Albumin 4.4 3.5 - 5.2 g/dL    Bilirubin Total 0.6 <=1.2 mg/dL    GFR Estimate 64 >60 mL/min/1.73m2       If you have any questions or concerns, please call the clinic at the number listed above.       Sincerely,      Amna Barney MD

## 2023-08-16 NOTE — PROGRESS NOTES
"SUBJECTIVE:   Libby is a 72 year old who presents for Preventive Visit.      8/16/2023     8:46 AM   Additional Questions   Roomed by Paty BAILEY LPN       Are you in the first 12 months of your Medicare coverage?  No    Healthy Habits:     In general, how would you rate your overall health?  Good    Frequency of exercise:  2-3 days/week    Duration of exercise:  15-30 minutes    Do you usually eat at least 4 servings of fruit and vegetables a day, include whole grains    & fiber and avoid regularly eating high fat or \"junk\" foods?  Yes    Taking medications regularly:  Yes    Medication side effects:  None    Ability to successfully perform activities of daily living:  No assistance needed    Home Safety:  No safety concerns identified    Hearing Impairment:  No hearing concerns    In the past 6 months, have you been bothered by leaking of urine?  No    In general, how would you rate your overall mental or emotional health?  Good    Additional concerns today:  No      Have you ever done Advance Care Planning? (For example, a Health Directive, POLST, or a discussion with a medical provider or your loved ones about your wishes): No, advance care planning information given to patient to review.  Patient plans to discuss their wishes with loved ones or provider.        Fall risk  Fallen 2 or more times in the past year?: No  Any fall with injury in the past year?: No      Patient declined cognitive screening    Cognitive Screening   1) Repeat 3 items (Leader, Season, Table)    2) Clock draw:   3) 3 item recall: 3  Results:  Patient declined    Mini-CogTM Copyright KAREN Martin. Licensed by the author for use in [facility]; reprinted with permission (carol@.[email or web address]). All rights reserved.      Do you have sleep apnea, excessive snoring or daytime drowsiness? : no    Reviewed and updated as needed this visit by clinical staff   Tobacco  Allergies  Meds      Soc Hx        Reviewed and updated as needed this visit " by Provider                 Social History     Tobacco Use    Smoking status: Never     Passive exposure: Past    Smokeless tobacco: Never    Tobacco comments:     no ecig   Substance Use Topics    Alcohol use: Not Currently         8/16/2023     8:33 AM   Alcohol Use   Prescreen: >3 drinks/day or >7 drinks/week? No     Do you have a current opioid prescription? No  Do you use any other controlled substances or medications that are not prescribed by a provider? None          PROBLEMS TO ADD ON...   Left hip osteoarthritis  - would like to see Dr Hamm - increasing pain with walking and activity   Bilateral leg numbness - EMG done last year showing a right sided peroneal nerve lesion, but not more proximal nor on the left   Colonoscopy needed  Lipids  - on Lipitor  - denies side effects     Current providers sharing in care for this patient include:   Patient Care Team:  Amna Mann MD as PCP - General (Family Medicine)  Balwinder Julian MD as Assigned Neuroscience Provider  Amna Mann MD as Assigned PCP    The following health maintenance items are reviewed in Epic and correct as of today:  Health Maintenance   Topic Date Due    DTAP/TDAP/TD IMMUNIZATION (1 - Tdap) Never done    MEDICARE ANNUAL WELLNESS VISIT  09/17/2022    COVID-19 Vaccine (5 - Moderna series) 03/09/2023    INFLUENZA VACCINE (1) 09/01/2023    COLORECTAL CANCER SCREENING  10/01/2023    FALL RISK ASSESSMENT  08/16/2024    ADVANCE CARE PLANNING  10/01/2024    MAMMO SCREENING  01/03/2025    LIPID  09/09/2027    DEXA  10/14/2036    HEPATITIS C SCREENING  Completed    PHQ-2 (once per calendar year)  Completed    Pneumococcal Vaccine: 65+ Years  Completed    ZOSTER IMMUNIZATION  Completed    IPV IMMUNIZATION  Aged Out    MENINGITIS IMMUNIZATION  Aged Out       Mammogram Screening: Mammogram Screening: Recommended mammography every 1-2 years with patient discussion and risk factor consideration        Review of Systems  "  Constitutional:  Negative for chills and fever.   HENT:  Negative for congestion, ear pain, hearing loss and sore throat.    Eyes:  Negative for pain and visual disturbance.   Respiratory:  Negative for cough and shortness of breath.    Cardiovascular:  Positive for peripheral edema. Negative for chest pain and palpitations.   Gastrointestinal:  Negative for abdominal pain, constipation, diarrhea, heartburn, hematochezia and nausea.   Breasts:  Negative for tenderness, breast mass and discharge.   Genitourinary:  Negative for dysuria, frequency, genital sores, hematuria, pelvic pain, vaginal bleeding and vaginal discharge.   Musculoskeletal:  Positive for arthralgias. Negative for joint swelling and myalgias.   Skin:  Negative for rash.   Neurological:  Negative for dizziness, weakness, headaches and paresthesias.   Psychiatric/Behavioral:  Negative for mood changes. The patient is not nervous/anxious.      Glasses - 2 years ago  Dentist - last month     Mole on her neck     OBJECTIVE:   /80 (BP Location: Right arm, Patient Position: Sitting, Cuff Size: Adult Regular)   Pulse 77   Temp 97.6  F (36.4  C) (Temporal)   Resp 14   Ht 1.651 m (5' 5\")   Wt 82.4 kg (181 lb 9.6 oz)   LMP 08/16/2003 (Approximate)   SpO2 96%   Breastfeeding No   BMI 30.22 kg/m   Estimated body mass index is 30.22 kg/m  as calculated from the following:    Height as of this encounter: 1.651 m (5' 5\").    Weight as of this encounter: 82.4 kg (181 lb 9.6 oz).  Physical Exam  GENERAL: healthy, alert and no distress  EYES: Eyes grossly normal to inspection, PERRL and conjunctivae and sclerae normal  HENT: ear canals and TM's normal, nose and mouth without ulcers or lesions  NECK: no adenopathy, no asymmetry, masses, or scars and thyroid normal to palpation  RESP: lungs clear to auscultation - no rales, rhonchi or wheezes  BREAST: normal without masses, tenderness or nipple discharge and no palpable axillary masses or adenopathy; " "prominent blood vessel right breast   CV: regular rate and rhythm, no murmur, LE varicose veins   ABDOMEN: soft, nontender, no hepatosplenomegaly, no masses and bowel sounds normal  MS: Decreased left hip range of motion  SKIN: mulitple moles and hemangioma   NEURO: Normal strength and tone, mentation intact and speech normal  PSYCH: mentation appears normal, affect normal/bright        ASSESSMENT / PLAN:       ICD-10-CM    1. Encounter for Medicare annual wellness exam  Z00.00       2. Mixed hyperlipidemia  E78.2 atorvastatin (LIPITOR) 10 MG tablet     Comprehensive Metabolic Panel     Lipid Profile      3. Primary osteoarthritis of left hip  M16.12 Orthopedic  Referral      4. Colon cancer screening  Z12.11 Colonoscopy Screening  Referral      5. Bilateral leg paresthesia  R20.2 Adult Neurology  Referral     CBC W PLT No Diff      6. Need for diphtheria-tetanus-pertussis (Tdap) vaccine  Z23 Tdap, tetanus-diptheria-acell pertussis, (BOOSTRIX) 5-2.5-18.5 LF-MCG/0.5 SUSP injection        1.  Referral for colonoscopy  2.  Orthopedic referral related to left hip osteoarthritis  3.  Neurology referral for ongoing evaluation of leg paresthesias.  Past initial metabolic work-up and EMG completed.  4.  Prescription is sent to her pharmacy for Boostrix  5.  Anticipate continuing same dose of atorvastatin  6.  Lab work to be obtained today, results pending.    Patient has been advised of split billing requirements and indicates understanding: Yes      COUNSELING:  Reviewed preventive health counseling, as reflected in patient instructions      BMI:   Estimated body mass index is 30.22 kg/m  as calculated from the following:    Height as of this encounter: 1.651 m (5' 5\").    Weight as of this encounter: 82.4 kg (181 lb 9.6 oz).         She reports that she has never smoked. She has been exposed to tobacco smoke. She has never used smokeless tobacco.      Appropriate preventive services were " discussed with this patient, including applicable screening as appropriate for cardiovascular disease, diabetes, osteopenia/osteoporosis, and glaucoma.  As appropriate for age/gender, discussed screening for colorectal cancer, prostate cancer, breast cancer, and cervical cancer. Checklist reviewing preventive services available has been given to the patient.    Reviewed patients plan of care and provided an AVS. The Intermediate Care Plan ( asthma action plan, low back pain action plan, and migraine action plan) for Libby meets the Care Plan requirement. This Care Plan has been established and reviewed with the Patient.          ARANZA FORTUNE MD  [facility]    Identified Health Risks:  I have reviewed Opioid Use Disorder and Substance Use Disorder risk factors and made any needed referrals.

## 2023-08-16 NOTE — PATIENT INSTRUCTIONS
Patient Education   Personalized Prevention Plan  You are due for the preventive services outlined below.  Your care team is available to assist you in scheduling these services.  If you have already completed any of these items, please share that information with your care team to update in your medical record.  Health Maintenance Due   Topic Date Due    Diptheria Tetanus Pertussis (DTAP/TDAP/TD) Vaccine (1 - Tdap) Never done    Annual Wellness Visit  09/17/2022    COVID-19 Vaccine (5 - Moderna series) 03/09/2023      Orders in for Dr Hamm, colonoscopy, neurology     Your mother is perfect - be thankful that you inherited half of your genes from her!  Amna Ordonez MD

## 2023-08-16 NOTE — NURSING NOTE
"Chief Complaint   Patient presents with    Medicare Visit     Annual well visit   Patient presents for annual well visit and states that she has been having Left Hip pain that has been ongoing for about 4 years, but is worsening.    Initial /80 (BP Location: Right arm, Patient Position: Sitting, Cuff Size: Adult Regular)   Pulse 77   Temp 97.6  F (36.4  C) (Temporal)   Resp 14   Ht 1.651 m (5' 5\")   Wt 82.4 kg (181 lb 9.6 oz)   LMP 08/16/2003 (Approximate)   SpO2 96%   Breastfeeding No   BMI 30.22 kg/m   Estimated body mass index is 30.22 kg/m  as calculated from the following:    Height as of this encounter: 1.651 m (5' 5\").    Weight as of this encounter: 82.4 kg (181 lb 9.6 oz).    Medication Reconciliation: complete      FOOD SECURITY SCREENING QUESTIONS:    The next two questions are to help us understand your food security.  If you are feeling you need any assistance in this area, we have resources available to support you today.    Hunger Vital Signs:  Within the past 12 months we worried whether our food would run out before we got money to buy more. Never  Within the past 12 months the food we bought just didn't last and we didn't have money to get more. Never        Advance care plan reviewed      Paty Feliciano LPN on 8/16/2023 at 8:51 AM      "

## 2023-08-17 DIAGNOSIS — Z86.0101 H/O ADENOMATOUS POLYP OF COLON: Primary | ICD-10-CM

## 2023-08-17 NOTE — TELEPHONE ENCOUNTER
Screening Questions for the Scheduling of Screening Colonoscopies   (If Colonoscopy is diagnostic, Provider should review the chart before scheduling.)  Are you younger than 50 or older than 80?  NO  Do you take aspirin or fish oil?  ASPIRIN (if yes, tell patient to stop 1 week prior to Colonoscopy)  Do you take warfarin (Coumadin), clopidogrel (Plavix), apixaban (Eliquis), dabigatram (Pradaxa), rivaroxaban (Xarelto) or any blood thinner? NO  Do you use oxygen at home?  NO  Do you have kidney disease? NO  Are you on dialysis? NO  Have you had a stroke or heart attack in the last year? NO  Have you had a stent in your heart or any blood vessel in the last year? NO  Have you had a transplant of any organ? NO  Have you had a colonoscopy or upper endoscopy (EGD) before? YES         When?  2018  Date of scheduled Colonoscopy. 11/06/2023  Provider MICHELL  Pharmacy [facility]

## 2023-08-18 LAB — HBA1C MFR BLD: 5.7 % (ref 4–6.2)

## 2023-08-18 RX ORDER — BISACODYL 5 MG
TABLET, DELAYED RELEASE (ENTERIC COATED) ORAL
Qty: 2 TABLET | Refills: 0 | Status: ON HOLD | OUTPATIENT
Start: 2023-08-18 | End: 2023-10-27

## 2023-08-18 RX ORDER — POLYETHYLENE GLYCOL 3350, SODIUM CHLORIDE, SODIUM BICARBONATE, POTASSIUM CHLORIDE 420; 11.2; 5.72; 1.48 G/4L; G/4L; G/4L; G/4L
4000 POWDER, FOR SOLUTION ORAL ONCE
Qty: 4000 ML | Refills: 0 | Status: SHIPPED | OUTPATIENT
Start: 2023-08-18 | End: 2023-08-18

## 2023-09-19 DIAGNOSIS — M25.552 PAIN OF LEFT HIP: Primary | ICD-10-CM

## 2023-09-20 ENCOUNTER — OFFICE VISIT (OUTPATIENT)
Dept: ORTHOPEDICS | Facility: OTHER | Age: 73
End: 2023-09-20
Attending: FAMILY MEDICINE
Payer: MEDICARE

## 2023-09-20 ENCOUNTER — HOSPITAL ENCOUNTER (OUTPATIENT)
Dept: GENERAL RADIOLOGY | Facility: OTHER | Age: 73
Discharge: HOME | End: 2023-09-20
Attending: ORTHOPAEDIC SURGERY
Payer: MEDICARE

## 2023-09-20 VITALS — DIASTOLIC BLOOD PRESSURE: 82 MMHG | HEART RATE: 69 BPM | SYSTOLIC BLOOD PRESSURE: 134 MMHG | OXYGEN SATURATION: 97 %

## 2023-09-20 DIAGNOSIS — M16.12 PRIMARY OSTEOARTHRITIS OF LEFT HIP: ICD-10-CM

## 2023-09-20 DIAGNOSIS — M25.552 PAIN OF LEFT HIP: ICD-10-CM

## 2023-09-20 PROCEDURE — 99213 OFFICE O/P EST LOW 20 MIN: CPT | Performed by: ORTHOPAEDIC SURGERY

## 2023-09-20 PROCEDURE — G0463 HOSPITAL OUTPT CLINIC VISIT: HCPCS | Mod: 25

## 2023-09-20 PROCEDURE — G0463 HOSPITAL OUTPT CLINIC VISIT: HCPCS

## 2023-09-20 PROCEDURE — 73502 X-RAY EXAM HIP UNI 2-3 VIEWS: CPT

## 2023-09-20 ASSESSMENT — PAIN SCALES - GENERAL: PAINLEVEL: SEVERE PAIN (6)

## 2023-09-20 NOTE — PROGRESS NOTES
Surgical Clinic Consult  Primary physician:     Amna Mann    Chief complaint:   Left hip pain    History of present illness:  This is a 72 year old female I am seeing in consultation for left hip pain chronic in nature.  Patient's been having pain for least the last 4 to 5 years.  There has been a decline in overall function noted at this point.  Patient did have EMG testing done fairly recently which was of normal nature.  Patient did have x-rays done here recently which revealed moderate protein and severe arthrosis of both hips at this time.  Patient's left hip is more significant than the right hip.  Patient reports pain with activity at this point.    Past medical history:   Past Medical History:   Diagnosis Date    Asymptomatic menopausal state     approximately age 47       Pastsurgical history:  Past Surgical History:   Procedure Laterality Date    ANKLE SURGERY      achilles tendon repair and heel spur removal     SECTION      x three    COLONOSCOPY      08,Next colonoscopy due in 2018.    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       Current medications:  Current Outpatient Medications   Medication Sig Dispense Refill    aspirin (ASA) 81 MG EC tablet Take 1 tablet (81 mg) by mouth daily      atorvastatin (LIPITOR) 10 MG tablet Take 1 tablet (10 mg) by mouth daily 90 tablet 3    bisacodyl (DULCOLAX) 5 MG EC tablet Use as directed per colonoscopy prep. 2 tablet 0    Calcium Polycarbophil (FIBER) 625 MG tablet Take by mouth daily      cyanocobalamin (VITAMIN B-12) 1000 MCG tablet Take 1 tablet (1,000 mcg) by mouth daily      MAGNESIUM PO Take 1 tablet by mouth daily      vitamin D3 (CHOLECALCIFEROL) 50 mcg (2000 units) tablet Take 1 tablet (50 mcg) by mouth daily          Allergies:  No Known Allergies    Family history:  Family History   Problem Relation Age of Onset    Breast Cancer Mother         Cancer-breast,living in her 80s    Cancer Mother         Cancer,kidney cancer    Heart Disease Father     Cancer Sister         Cancer,lung cancer, was a smoker    Cancer Sister         Cancer,lung CA    Other - See Comments Sister         hysterectomy    Family History Negative Sister         Good Health    Family History Negative Brother         Good Health    Family History Negative Brother         Good Health    Family History Negative Daughter         Good Health,80.    Family History Negative Daughter         Good Health,84.    Family History Negative Son         Good Health,72.    Family History Negative Other         Good Health,date of birth 53.    Thyroid Disease Maternal Grandmother        Social history:  Social History     Socioeconomic History    Marital status:      Spouse name: Not on file    Number of children: 3    Years of education: Not on file    Highest education level: Not on file   Occupational History    Not on file   Tobacco Use    Smoking status: Never     Passive exposure: Past    Smokeless tobacco: Never    Tobacco comments:     no ecig   Vaping Use    Vaping Use: Never used   Substance and Sexual Activity    Alcohol use: Not Currently    Drug use: No    Sexual activity: Not Currently   Other Topics Concern    Not on file   Social History Narrative    Marcela Child; 80.    Citlali Child; 84.    Dexter Child; 72.     Ronald Spouse; date of birth 53.  2021 heart attack     Social Determinants of Health     Financial Resource Strain: Not on file   Food Insecurity: Not on file   Transportation Needs: Not on file   Physical Activity: Not on file   Stress: Not on file   Social Connections: Not on file   Interpersonal Safety: Not on file   Housing Stability: Not on file       PROBLEM  LIST:  Patient Active Problem List   Diagnosis    Obesity    Healthcare maintenance    H/O adenomatous polyp of colon    GI bleed    Vitamin B12 deficiency    Osteoarthritis of left hip    Nevus of nose       Review of Systems:  COMPLETE 12 point REVIEW OF SYSTEMS is otherwise negative with the exception of which is stated above.    Physical exam: /82 (BP Location: Right arm, Patient Position: Sitting)   Pulse 69   LMP 2003 (Approximate)   SpO2 97%     General: this is a pleasant female patient in no acute distress.  Patient is awake alert and oriented x3 .   EXAM:  Chest/Respiratory Exam: Normal - Clear to auscultation without rales, rhonchi, or wheezing.  Cardiovascular Exam: normal  Musculoskeletal: Left hip examination shows hip flexed to 100.  Internal rotation to 10.  External rotation to 15.  Crepitation with flexion and extension is noted.  No pain with palpation across the bursa.  Neurovascular examination is intact.    Imagin views left hip show severe arthrosis left hip joint    Assessment:   Left hip osteoarthrosis severe    Plan:    Long discussion here today and we plan to proceed forward with left direct anterior total hip replacement.  All questions answered as a pertains to these intervention.  Timeframe for intervention likely will be in November timeframe.  Aspirin for DVT prophylaxis.      Billy Hamm MD

## 2023-09-21 ENCOUNTER — OFFICE VISIT (OUTPATIENT)
Dept: NEUROLOGY | Facility: OTHER | Age: 73
End: 2023-09-21
Attending: PSYCHIATRY & NEUROLOGY
Payer: MEDICARE

## 2023-09-21 ENCOUNTER — TELEPHONE (OUTPATIENT)
Dept: ORTHOPEDICS | Facility: OTHER | Age: 73
End: 2023-09-21
Payer: MEDICARE

## 2023-09-21 VITALS
SYSTOLIC BLOOD PRESSURE: 148 MMHG | BODY MASS INDEX: 30.52 KG/M2 | WEIGHT: 183.4 LBS | OXYGEN SATURATION: 97 % | HEART RATE: 73 BPM | TEMPERATURE: 98.4 F | DIASTOLIC BLOOD PRESSURE: 96 MMHG | RESPIRATION RATE: 18 BRPM

## 2023-09-21 DIAGNOSIS — G60.3 IDIOPATHIC PROGRESSIVE POLYNEUROPATHY: Primary | ICD-10-CM

## 2023-09-21 DIAGNOSIS — M54.16 LUMBAR RADICULOPATHY: ICD-10-CM

## 2023-09-21 DIAGNOSIS — M16.12 PRIMARY OSTEOARTHRITIS OF LEFT HIP: Primary | ICD-10-CM

## 2023-09-21 PROCEDURE — G0463 HOSPITAL OUTPT CLINIC VISIT: HCPCS | Mod: 25

## 2023-09-21 PROCEDURE — 95912 NRV CNDJ TEST 11-12 STUDIES: CPT | Mod: 26 | Performed by: PSYCHIATRY & NEUROLOGY

## 2023-09-21 PROCEDURE — 95886 MUSC TEST DONE W/N TEST COMP: CPT | Mod: 26 | Performed by: PSYCHIATRY & NEUROLOGY

## 2023-09-21 PROCEDURE — 99214 OFFICE O/P EST MOD 30 MIN: CPT | Mod: 25 | Performed by: PSYCHIATRY & NEUROLOGY

## 2023-09-21 PROCEDURE — 95912 NRV CNDJ TEST 11-12 STUDIES: CPT | Performed by: PSYCHIATRY & NEUROLOGY

## 2023-09-21 PROCEDURE — 95886 MUSC TEST DONE W/N TEST COMP: CPT | Performed by: PSYCHIATRY & NEUROLOGY

## 2023-09-21 ASSESSMENT — PAIN SCALES - GENERAL: PAINLEVEL: MODERATE PAIN (4)

## 2023-09-21 NOTE — LETTER
9/21/2023         RE: Libby Elliott  [address] [address] [place] [address]        Dear Colleague,    Thank you for referring your patient, Libby Elliott, to [facility]. Please see a copy of my visit note below.    Referring physician: Maryuri Barney MD    History: The patient was diagnosed in 2019 with mild large fiber sensory axonal neuropathy.  She had neurodiagnostic testing 14 months ago which also showed injury to the right peroneal nerve at the anterior ankle.  The patient has been very careful to avoid trauma to that region.  For the last year she has had an increasing problem with pain that radiates between the foot and the knee.  This is limited to the right side.  He has longstanding lumbar pain and has been chronically overweight.    Physical examination: There is mild weakness on the right for the dorsiflexors of the foot.  There is normal strength bilaterally for the plantar flexors, quadriceps and hamstrings.  Reflexes are absent at the ankles and hypoactive at the knees.  Vibratory extinction is 6 seconds for 128 Hz tuning fork applied to the right ankle and 5 seconds at the left.  Cold sensation is preserved in the feet.    Nerve conduction studies: Motor nerve conduction testing was performed bilaterally for the peroneal and tibial nerves.  Distal latencies and conduction velocities were normal.  Amplitude of the waveforms were was reduced for the right peroneal nerve this nerve failed to produce some H reflex, a finding most often associated with L5 radiculopathy.    Antidromic sensory nerve conduction studies were performed for the bilateral sural and peroneal nerves.  Amplitudes were reduced and in the case of the right peroneal nerve no waveform was produced.    Monopolar EMG needle examination: EMG was performed bilaterally for the vastus lateralis, tibialis anterior, gastrocnemius, extensor hallux longus, and flexor digitorum longus.  There were  moderate grade subacute to chronic denervation changes in the right L5 myotome.    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  This has progressed very minimally since identified in 2019.  She continues to show some evidence for injury to the right peroneal nerve at the anterior ankle but this has improved compared to the patient's evaluation 14 months ago.  The significant new finding, relative to her evaluation last year, is the presence of denervation in the right L5 myotome and the absence of the H reflex for the peroneal nerve.  She appears to have mild to moderate subacute to chronic right L5 radiculopathy.  MRI of the lumbar spine should be considered.    Findings were reviewed with the patient.    Again, thank you for allowing me to participate in the care of your patient.        Sincerely,        Balwinder Luna MD

## 2023-09-21 NOTE — PROGRESS NOTES
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  She had neurodiagnostic testing 14 months ago which also showed injury to the right peroneal nerve at the anterior ankle.  The patient has been very careful to avoid trauma to that region.  For the last year she has had an increasing problem with pain that radiates between the foot and the knee.  This is limited to the right side.  He has longstanding lumbar pain and has been chronically overweight.    Physical examination: There is mild weakness on the right for the dorsiflexors of the foot.  There is normal strength bilaterally for the plantar flexors, quadriceps and hamstrings.  Reflexes are absent at the ankles and hypoactive at the knees.  Vibratory extinction is 6 seconds for 128 Hz tuning fork applied to the right ankle and 5 seconds at the left.  Cold sensation is preserved in the feet.    Nerve conduction studies: Motor nerve conduction testing was performed bilaterally for the peroneal and tibial nerves.  Distal latencies and conduction velocities were normal.  Amplitude of the waveforms were was reduced for the right peroneal nerve this nerve failed to produce some H reflex, a finding most often associated with L5 radiculopathy.    Antidromic sensory nerve conduction studies were performed for the bilateral sural and peroneal nerves.  Amplitudes were reduced and in the case of the right peroneal nerve no waveform was produced.    Monopolar EMG needle examination: EMG was performed bilaterally for the vastus lateralis, tibialis anterior, gastrocnemius, extensor hallux longus, and flexor digitorum longus.  There were moderate grade subacute to chronic denervation changes in the right L5 myotome.    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  This  has progressed very minimally since identified in 2019.  She continues to show some evidence for injury to the right peroneal nerve at the anterior ankle but this has improved compared to the patient's evaluation 14 months ago.  The significant new finding, relative to her evaluation last year, is the presence of denervation in the right L5 myotome and the absence of the H reflex for the peroneal nerve.  She appears to have mild to moderate subacute to chronic right L5 radiculopathy.  MRI of the lumbar spine should be considered.    Findings were reviewed with the patient.

## 2023-09-21 NOTE — TELEPHONE ENCOUNTER
Let us know if this doesn't get better!   Needs OP PT to start 5-7 days post-surgery. Hansel  11/3/23  left total hip arthroplasty.

## 2023-09-21 NOTE — NURSING NOTE
Patient is here for EMG for numbness in BLE.  Angelita Martinez LPN .............9/21/2023     11:02 AM

## 2023-09-22 ENCOUNTER — TELEPHONE (OUTPATIENT)
Dept: FAMILY MEDICINE | Facility: OTHER | Age: 73
End: 2023-09-22
Payer: MEDICARE

## 2023-09-22 DIAGNOSIS — M54.17 LUMBOSACRAL RADICULOPATHY AT L5: ICD-10-CM

## 2023-09-22 DIAGNOSIS — R20.2 BILATERAL LEG PARESTHESIA: Primary | ICD-10-CM

## 2023-10-10 ENCOUNTER — HOSPITAL ENCOUNTER (OUTPATIENT)
Dept: MRI IMAGING | Facility: OTHER | Age: 73
Discharge: HOME | End: 2023-10-10
Attending: FAMILY MEDICINE | Admitting: FAMILY MEDICINE
Payer: MEDICARE

## 2023-10-10 DIAGNOSIS — M54.17 LUMBOSACRAL RADICULOPATHY AT L5: ICD-10-CM

## 2023-10-10 DIAGNOSIS — R20.2 BILATERAL LEG PARESTHESIA: ICD-10-CM

## 2023-10-10 PROCEDURE — G1010 CDSM STANSON: HCPCS

## 2023-10-11 DIAGNOSIS — M48.062 SPINAL STENOSIS OF LUMBAR REGION WITH NEUROGENIC CLAUDICATION: Primary | ICD-10-CM

## 2023-10-12 DIAGNOSIS — M16.12 PRIMARY OSTEOARTHRITIS OF LEFT HIP: Primary | ICD-10-CM

## 2023-10-13 ENCOUNTER — TELEPHONE (OUTPATIENT)
Dept: FAMILY MEDICINE | Facility: OTHER | Age: 73
End: 2023-10-13
Payer: MEDICARE

## 2023-10-13 NOTE — TELEPHONE ENCOUNTER
Patient called and requested a call back regarding several questions including MRI and what the ortho appointment for her back would specifically entail.    Okay to leave detailed message.        Arcelia Bhandari on 10/13/2023 at 2:58 PM

## 2023-10-16 NOTE — TELEPHONE ENCOUNTER
Returned patient call. No answer, will call back.    Paty Feliciano LPN on 10/16/2023 at 3:49 PM

## 2023-10-17 NOTE — TELEPHONE ENCOUNTER
Returned call to patient and sh got answers to her questions.    Paty Feliciano LPN on 10/17/2023 at 1:36 PM

## 2023-10-20 ENCOUNTER — OFFICE VISIT (OUTPATIENT)
Dept: FAMILY MEDICINE | Facility: OTHER | Age: 73
End: 2023-10-20
Attending: PHYSICIAN ASSISTANT
Payer: MEDICARE

## 2023-10-20 VITALS
OXYGEN SATURATION: 98 % | SYSTOLIC BLOOD PRESSURE: 126 MMHG | HEIGHT: 65 IN | WEIGHT: 181 LBS | BODY MASS INDEX: 30.16 KG/M2 | TEMPERATURE: 97.9 F | DIASTOLIC BLOOD PRESSURE: 80 MMHG | HEART RATE: 73 BPM | RESPIRATION RATE: 20 BRPM

## 2023-10-20 DIAGNOSIS — E66.09 CLASS 1 OBESITY DUE TO EXCESS CALORIES WITHOUT SERIOUS COMORBIDITY WITH BODY MASS INDEX (BMI) OF 30.0 TO 30.9 IN ADULT: ICD-10-CM

## 2023-10-20 DIAGNOSIS — E66.811 CLASS 1 OBESITY DUE TO EXCESS CALORIES WITHOUT SERIOUS COMORBIDITY WITH BODY MASS INDEX (BMI) OF 30.0 TO 30.9 IN ADULT: ICD-10-CM

## 2023-10-20 DIAGNOSIS — M16.12 PRIMARY OSTEOARTHRITIS OF LEFT HIP: ICD-10-CM

## 2023-10-20 DIAGNOSIS — E53.8 VITAMIN B12 DEFICIENCY: ICD-10-CM

## 2023-10-20 DIAGNOSIS — Z01.818 PREOP GENERAL PHYSICAL EXAM: Primary | ICD-10-CM

## 2023-10-20 PROBLEM — D22.39 NEVUS OF NOSE: Status: RESOLVED | Noted: 2019-07-17 | Resolved: 2023-10-20

## 2023-10-20 PROBLEM — K92.2 GI BLEED: Status: RESOLVED | Noted: 2018-10-06 | Resolved: 2023-10-20

## 2023-10-20 LAB
ALBUMIN UR-MCNC: NEGATIVE MG/DL
ANION GAP SERPL CALCULATED.3IONS-SCNC: 10 MMOL/L (ref 7–15)
APPEARANCE UR: CLEAR
BILIRUB UR QL STRIP: NEGATIVE
BUN SERPL-MCNC: 13.9 MG/DL (ref 8–23)
CALCIUM SERPL-MCNC: 9.5 MG/DL (ref 8.8–10.2)
CHLORIDE SERPL-SCNC: 103 MMOL/L (ref 98–107)
COLOR UR AUTO: YELLOW
CREAT SERPL-MCNC: 0.92 MG/DL (ref 0.51–0.95)
DEPRECATED HCO3 PLAS-SCNC: 25 MMOL/L (ref 22–29)
EGFRCR SERPLBLD CKD-EPI 2021: 67 ML/MIN/1.73M2
GLUCOSE SERPL-MCNC: 101 MG/DL (ref 70–99)
GLUCOSE UR STRIP-MCNC: NEGATIVE MG/DL
HGB BLD-MCNC: 14.7 G/DL (ref 11.7–15.7)
HGB UR QL STRIP: NEGATIVE
KETONES UR STRIP-MCNC: NEGATIVE MG/DL
LEUKOCYTE ESTERASE UR QL STRIP: NEGATIVE
NITRATE UR QL: NEGATIVE
PH UR STRIP: 5 [PH] (ref 5–9)
POTASSIUM SERPL-SCNC: 4 MMOL/L (ref 3.4–5.3)
SODIUM SERPL-SCNC: 138 MMOL/L (ref 135–145)
SP GR UR STRIP: 1.01 (ref 1–1.03)
UROBILINOGEN UR STRIP-MCNC: NORMAL MG/DL

## 2023-10-20 PROCEDURE — 99214 OFFICE O/P EST MOD 30 MIN: CPT | Performed by: PHYSICIAN ASSISTANT

## 2023-10-20 PROCEDURE — 85018 HEMOGLOBIN: CPT | Mod: ZL | Performed by: PHYSICIAN ASSISTANT

## 2023-10-20 PROCEDURE — 80048 BASIC METABOLIC PNL TOTAL CA: CPT | Mod: ZL | Performed by: PHYSICIAN ASSISTANT

## 2023-10-20 PROCEDURE — 36415 COLL VENOUS BLD VENIPUNCTURE: CPT | Mod: ZL | Performed by: PHYSICIAN ASSISTANT

## 2023-10-20 PROCEDURE — 93005 ELECTROCARDIOGRAM TRACING: CPT | Performed by: PHYSICIAN ASSISTANT

## 2023-10-20 PROCEDURE — G0463 HOSPITAL OUTPT CLINIC VISIT: HCPCS

## 2023-10-20 PROCEDURE — 93010 ELECTROCARDIOGRAM REPORT: CPT | Performed by: INTERNAL MEDICINE

## 2023-10-20 PROCEDURE — 81003 URINALYSIS AUTO W/O SCOPE: CPT | Mod: ZL | Performed by: PHYSICIAN ASSISTANT

## 2023-10-20 ASSESSMENT — PAIN SCALES - GENERAL: PAINLEVEL: MILD PAIN (3)

## 2023-10-20 NOTE — PROGRESS NOTES
[facility] \[facility]\""  [address]  [place] [address]  Phone: [phone]  Fax: [phone]  Primary Provider: Amna Mann  Pre-op Performing Provider: KONG PETERS      PREOPERATIVE EVALUATION:  Today's date: 10/20/2023    Libby is a 72 year old female who presents for a preoperative evaluation.      10/20/2023    12:52 PM   Additional Questions   Roomed by Lilliana GUZMAN LPN   Accompanied by self       Surgical Information:  Surgery/Procedure: left total hip arthroplasty  Surgery Location: [facility]  Surgeon: Dr. Hamm   Surgery Date: 11/03/23  Time of Surgery: tbd  Where patient plans to recover: At home with family  Fax number for surgical facility: Note does not need to be faxed, will be available electronically in Epic.    Assessment & Plan     The proposed surgical procedure is considered INTERMEDIATE risk.    Problem List Items Addressed This Visit          Digestive    Obesity    Vitamin B12 deficiency       Musculoskeletal and Integumentary    Osteoarthritis of left hip     Other Visit Diagnoses       Preop general physical exam    -  Primary    Relevant Orders    Hemoglobin (Completed)    Basic Metabolic Panel (Completed)    EKG 12-lead, tracing only (Completed)    UA reflex to Microscopic (Completed)          Complete hemoglobin, BMP, urinalysis, and EKG for preoperative clearance.  Testing is stable.  No acute concerns at this time prior to surgery.  Patient is approved for surgery.    History of vitamin B12 deficiency and obesity: Currently stable.  No acute concerns at this time.  No medication changes are warranted at this time.       Risks and Recommendations:  The patient has the following additional risks and recommendations for perioperative complications:   - No identified additional risk factors other than previously addressed    Antiplatelet or Anticoagulation Medication Instructions:   - aspirin: Discontinue aspirin 7-10 days prior to procedure to  reduce bleeding risk. It should be resumed postoperatively.     Additional Medication Instructions:  Patient Instructions   Patient should take the following medications the morning of surgery with a small sip of water: hold all meds.  Patient was instructed to hold the following medications the morning of surgery: hold all meds.     Patient was advised to call our office and the surgical services with any change in condition or new symptoms if they were to develop between today and their surgical date.  Especially any cardiopulmonary symptoms or symptoms concerning for an infection.     Discontinue aspirin, aleve, naproxen and ibuprofen 7 days prior to surgery to reduce bleeding risk.  It is fine to take tylenol the week before surgery.  Hold vitamins and herbal remedies for 14 days before surgery.     Preparing for Your Surgery  Getting started  A nurse will call you to review your health history and instructions. They will give you an arrival time based on your scheduled surgery time. Please be ready to share:  Your doctor's clinic name and phone number  Your medical, surgical, and anesthesia history  A list of allergies and sensitivities  A list of medicines, including herbal treatments and over-the-counter drugs  Whether the patient has a legal guardian (ask how to send us the papers in advance)  Please tell us if you're pregnant--or if there's any chance you might be pregnant. Some surgeries may injure a fetus (unborn baby), so they require a pregnancy test. Surgeries that are safe for a fetus don't always need a test, and you can choose whether to have one.   If you have a child who's having surgery, please ask for a copy of Preparing for Your Child's Surgery.    Preparing for surgery  Within 10 to 30 days of surgery: Have a pre-op exam (sometimes called an H&P, or History and Physical). This can be done at a clinic or pre-operative center.  If you're having a , you may not need this exam. Talk to  your care team.  At your pre-op exam, talk to your care team about all medicines you take. If you need to stop any medicines before surgery, ask when to start taking them again.  We do this for your safety. Many medicines can make you bleed too much during surgery. Some change how well surgery (anesthesia) drugs work.  Call your insurance company to let them know you're having surgery. (If you don't have insurance, call [phone].)  Call your clinic if there's any change in your health. This includes signs of a cold or flu (sore throat, runny nose, cough, rash, fever). It also includes a scrape or scratch near the surgery site.  If you have questions on the day of surgery, call your hospital or surgery center.  Eating and drinking guidelines  For your safety: Unless your surgeon tells you otherwise, follow the guidelines below.  Eat and drink as usual until 8 hours before you arrive for surgery. After that, no food or milk.  Drink clear liquids until 2 hours before you arrive. These are liquids you can see through, like water, Gatorade, and Propel Water. They also include plain black coffee and tea (no cream or milk), candy, and breath mints. You can spit out gum when you arrive.  If you drink alcohol: Stop drinking it the night before surgery.  If your care team tells you to take medicine on the morning of surgery, it's okay to take it with a sip of water.  Preventing infection  Shower or bathe the night before and morning of your surgery. Follow the instructions your clinic gave you. (If no instructions, use regular soap.)  Don't shave or clip hair near your surgery site. We'll remove the hair if needed.  Don't smoke or vape the morning of surgery. You may chew nicotine gum up to 2 hours before surgery. A nicotine patch is okay.  Note: Some surgeries require you to completely quit smoking and nicotine. Check with your surgeon.  Your care team will make every effort to keep you safe from infection. We  will:  Clean our hands often with soap and water (or an alcohol-based hand rub).  Clean the skin at your surgery site with a special soap that kills germs.  Give you a special gown to keep you warm. (Cold raises the risk of infection.)  Wear special hair covers, masks, gowns and gloves during surgery.  Give antibiotic medicine, if prescribed. Not all surgeries need antibiotics.  What to bring on the day of surgery  Photo ID and insurance card  Copy of your health care directive, if you have one  Glasses and hearing aids (bring cases)  You can't wear contacts during surgery  Inhaler and eye drops, if you use them (tell us about these when you arrive)  CPAP machine or breathing device, if you use them  A few personal items, if spending the night  If you have . . .  A pacemaker, ICD (cardiac defibrillator) or other implant: Bring the ID card.  An implanted stimulator: Bring the remote control.  A legal guardian: Bring a copy of the certified (court-stamped) guardianship papers.  Please remove any jewelry, including body piercings. Leave jewelry and other valuables at home.  If you're going home the day of surgery  You must have a responsible adult drive you home. They should stay with you overnight as well.  If you don't have someone to stay with you, and you aren't safe to go home alone, we may keep you overnight. Insurance often won't pay for this.  After surgery  If it's hard to control your pain or you need more pain medicine, please call your surgeon's office.  Questions?   If you have any questions for your care team, list them here: _________________________________________________________________________________________________________________________________________________________________________ ____________________________________ ____________________________________ ____________________________________  For informational purposes only. Not to replace the advice of your health care provider. Copyright    2003, 2019 [facility] [place]. All rights reserved. Clinically reviewed by Padmini Jackman MD. [employer] [id number] - REV 12/22.    How to Take Your Medication Before Surgery  - Take all of your medications before surgery except as noted below  - HOLD (do not take) Aspirin for 10 days  - STOP taking all vitamins and herbal supplements 14 days before surgery.     RECOMMENDATION:  APPROVAL GIVEN to proceed with proposed procedure, without further diagnostic evaluation.    Ordering of each unique test  30 minutes spent by me on the date of the encounter doing chart review, history and exam, documentation and further activities per the note      Subjective       HPI related to upcoming procedure:   Patient has history of having chronic left-sided hip pain.  Uses ice as needed for symptomatic relief.  Pain is present on the left lateral hip.  Patient states that pain has been present for at least 3 years.  Has had a decline in overall function due to the hip pain.  Recent x-ray showed moderately severe degenerative changes in both hips.        10/13/2023    10:31 AM   Preop Questions   1. Have you ever had a heart attack or stroke? No   2. Have you ever had surgery on your heart or blood vessels, such as a stent placement, a coronary artery bypass, or surgery on an artery in your head, neck, heart, or legs? No   3. Do you have chest pain with activity? No   4. Do you have a history of  heart failure? No   5. Do you currently have a cold, bronchitis or symptoms of other infection? No   6. Do you have a cough, shortness of breath, or wheezing? No   7. Do you or anyone in your family have previous history of blood clots? UNKNOWN - not that she knows of   8. Do you or does anyone in your family have a serious bleeding problem such as prolonged bleeding following surgeries or cuts? No   9. Have you ever had problems with anemia or been told to take iron pills? No   10. Have you had any abnormal blood loss such as black, tarry  or bloody stools, or abnormal vaginal bleeding? No   11. Have you ever had a blood transfusion? No   12. Are you willing to have a blood transfusion if it is medically needed before, during, or after your surgery? Yes   13. Have you or any of your relatives ever had problems with anesthesia? UNKNOWN - not that she knows of   14. Do you have sleep apnea, excessive snoring or daytime drowsiness? No   15. Do you have any artifical heart valves or other implanted medical devices like a pacemaker, defibrillator, or continuous glucose monitor? No   16. Do you have artificial joints? No   17. Are you allergic to latex? No       Health Care Directive:  Patient has a Health Care Directive on file      Preoperative Review of :   reviewed - no record of controlled substances prescribed.      Status of Chronic Conditions:  Patient has been doing well.  History of vitamin B12 deficiency.  Currently takes vitamin B12 supplementation for maintenance.  History of obesity.  Stable.  No acute concerns at this time.  Patient has tolerated surgery well in the past.  No side effects noted.    Patient has tolerated surgery well in the past.  Patient has no recent cough or cold symptoms.  No recent fevers, chills, sore throat, ear pain, chest pain, palpitations, problems breathing, stomachaches, nausea, vomiting, diarrhea, constipation, blood in stool or urine, dysuria, frequency, urgency.    Patient can walk up a flight of stairs without becoming short of breath or having chest pain: YES   Patient is able to tolerate greater than 4 METs of activity without any cardiopulmonary symptoms.    Review of Systems  CONSTITUTIONAL: NEGATIVE for fever, chills, change in weight  INTEGUMENTARY/SKIN: NEGATIVE for worrisome rashes, moles or lesions  EYES: NEGATIVE for vision changes or irritation  ENT/MOUTH: NEGATIVE for ear, mouth and throat problems  RESP: NEGATIVE for significant cough or SOB  CV: NEGATIVE for chest pain, palpitations or  peripheral edema  GI: NEGATIVE for nausea, abdominal pain, heartburn, or change in bowel habits  : NEGATIVE for frequency, dysuria, or hematuria  MUSCULOSKELETAL: NEGATIVE for significant arthralgias or myalgia  NEURO: NEGATIVE for weakness, dizziness or paresthesias  ENDOCRINE: NEGATIVE for temperature intolerance, skin/hair changes  HEME: NEGATIVE for bleeding problems  PSYCHIATRIC: NEGATIVE for changes in mood or affect    Patient Active Problem List    Diagnosis Date Noted    Vitamin B12 deficiency 2019     Priority: Medium    Osteoarthritis of left hip 2019     Priority: Medium    H/O adenomatous polyp of colon 10/01/2018     Priority: Medium    Healthcare maintenance 2018     Priority: Medium    Obesity 2018     Priority: Medium      Past Medical History:   Diagnosis Date    Asymptomatic menopausal state     approximately age 47    GI bleed     Nevus of nose      Past Surgical History:   Procedure Laterality Date    ANKLE SURGERY      achilles tendon repair and heel spur removal     SECTION      x three    COLONOSCOPY      10,Next colonoscopy due in 2018.    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     Current Outpatient Medications   Medication Sig Dispense Refill    aspirin (ASA) 81 MG EC tablet Take 1 tablet (81 mg) by mouth daily      atorvastatin (LIPITOR) 10 MG tablet Take 1 tablet (10 mg) by mouth daily 90 tablet 3    bisacodyl (DULCOLAX) 5 MG EC tablet Use as directed per colonoscopy prep. 2 tablet 0    Calcium Polycarbophil (FIBER) 625 MG tablet Take by mouth daily      cyanocobalamin (VITAMIN B-12) 1000 MCG tablet Take 1 tablet (1,000 mcg) by mouth daily      MAGNESIUM PO Take 1 tablet by mouth daily         No Known Allergies     Social History  "    Tobacco Use    Smoking status: Never     Passive exposure: Past    Smokeless tobacco: Never    Tobacco comments:     no ecig   Substance Use Topics    Alcohol use: Not Currently     Family History   Problem Relation Age of Onset    Breast Cancer Mother         Cancer-breast,living in her 80s    Cancer Mother         Cancer,kidney cancer    Other - See Comments Mother         eye cancer    Heart Disease Father         MI    Heart Failure Father     Cancer Sister         Cancer,lung cancer, was a smoker    Cancer Sister         Cancer,lung CA    Other - See Comments Sister         hysterectomy    Family History Negative Sister         Good Health    Lung Cancer Sister     Lung Cancer Sister     Heart Disease Brother         enlarged heart    Family History Negative Brother         Good Health    Thyroid Disease Maternal Grandmother     Family History Negative Daughter         Good Health,02/28/80.    Family History Negative Daughter         Good Health,01/24/84.    Family History Negative Son         Good Health,03/21/72.    Family History Negative Other         Good Health,date of birth 12/04[birth year]51.     History   Drug Use No         Objective     /80   Pulse 73   Temp 97.9  F (36.6  C) (Tympanic)   Resp 20   Ht 1.651 m (5' 5\")   Wt 82.1 kg (181 lb)   LMP 08/16/2003 (Approximate)   SpO2 98%   BMI 30.12 kg/m      Physical Exam    GENERAL APPEARANCE: healthy, alert and no distress     EYES: EOMI, PERRL     HENT: ear canals and TM's normal and nose and mouth without ulcers or lesions     NECK: no adenopathy, no asymmetry, masses, or scars and thyroid normal to palpation     RESP: lungs clear to auscultation - no rales, rhonchi or wheezes     CV: regular rates and rhythm, normal S1 S2, no S3 or S4 and no murmur, click or rub     ABDOMEN:  soft, nontender, no HSM or masses and bowel sounds normal     MS: extremities normal- no gross deformities noted, no evidence of inflammation in joints, FROM in all " extremities.     SKIN: no suspicious lesions or rashes     NEURO: Normal strength and tone, sensory exam grossly normal, mentation intact and speech normal     PSYCH: mentation appears normal. and affect normal/bright     LYMPHATICS: No cervical adenopathy    Recent Labs   Lab Test 08/16/23  0959 06/27/22  1226   HGB 14.1 14.8    272    142   POTASSIUM 4.2 3.9   CR 0.95 0.94   A1C 5.7 5.7        Diagnostics:  Recent Results (from the past 720 hour(s))   UA reflex to Microscopic    Collection Time: 10/20/23  1:31 PM   Result Value Ref Range    Color Urine Yellow Colorless, Straw, Light Yellow, Yellow    Appearance Urine Clear Clear    Glucose Urine Negative Negative mg/dL    Bilirubin Urine Negative Negative    Ketones Urine Negative Negative mg/dL    Specific Gravity Urine 1.006 1.000 - 1.030    Blood Urine Negative Negative    pH Urine 5.0 5.0 - 9.0    Protein Albumin Urine Negative Negative mg/dL    Urobilinogen Urine Normal Normal, 2.0 mg/dL    Nitrite Urine Negative Negative    Leukocyte Esterase Urine Negative Negative   Hemoglobin    Collection Time: 10/20/23  1:31 PM   Result Value Ref Range    Hemoglobin 14.7 11.7 - 15.7 g/dL   Basic Metabolic Panel    Collection Time: 10/20/23  1:31 PM   Result Value Ref Range    Sodium 138 135 - 145 mmol/L    Potassium 4.0 3.4 - 5.3 mmol/L    Chloride 103 98 - 107 mmol/L    Carbon Dioxide (CO2) 25 22 - 29 mmol/L    Anion Gap 10 7 - 15 mmol/L    Urea Nitrogen 13.9 8.0 - 23.0 mg/dL    Creatinine 0.92 0.51 - 0.95 mg/dL    GFR Estimate 67 >60 mL/min/1.73m2    Calcium 9.5 8.8 - 10.2 mg/dL    Glucose 101 (H) 70 - 99 mg/dL   EKG 12-lead, tracing only    Collection Time: 10/20/23  1:40 PM   Result Value Ref Range    Systolic Blood Pressure  mmHg    Diastolic Blood Pressure  mmHg    Ventricular Rate 66 BPM    Atrial Rate 66 BPM    KY Interval 134 ms    QRS Duration 92 ms     ms    QTc 417 ms    P Axis 54 degrees    R AXIS -62 degrees    T Axis 25 degrees     Interpretation ECG       Sinus rhythm  Left anterior fascicular block  Abnormal ECG  No previous ECGs available        EKG: Normal Sinus Rhythm, left anterior fascicular block, unchanged from previous tracings    Revised Cardiac Risk Index (RCRI):  The patient has the following serious cardiovascular risks for perioperative complications:   - No serious cardiac risks = 0 points     RCRI Interpretation: 0 points: Class I (very low risk - 0.4% complication rate)         Signed Electronically by: Eva Esqueda PA-C  Copy of this evaluation report is provided to requesting physician.

## 2023-10-20 NOTE — H&P (VIEW-ONLY)
[facility]  [address]  [place] [address]  Phone: [phone]  Fax: [phone]  Primary Provider: Amna Mann  Pre-op Performing Provider: KONG PETERS      PREOPERATIVE EVALUATION:  Today's date: 10/20/2023    Libby is a 72 year old female who presents for a preoperative evaluation.      10/20/2023    12:52 PM   Additional Questions   Roomed by Lilliana GUZMAN LPN   Accompanied by self       Surgical Information:  Surgery/Procedure: left total hip arthroplasty  Surgery Location: [facility]  Surgeon: Dr. Hamm   Surgery Date: 11/03/23  Time of Surgery: tbd  Where patient plans to recover: At home with family  Fax number for surgical facility: Note does not need to be faxed, will be available electronically in Epic.    Assessment & Plan     The proposed surgical procedure is considered INTERMEDIATE risk.    Problem List Items Addressed This Visit          Digestive    Obesity    Vitamin B12 deficiency       Musculoskeletal and Integumentary    Osteoarthritis of left hip     Other Visit Diagnoses       Preop general physical exam    -  Primary    Relevant Orders    Hemoglobin (Completed)    Basic Metabolic Panel (Completed)    EKG 12-lead, tracing only (Completed)    UA reflex to Microscopic (Completed)          Complete hemoglobin, BMP, urinalysis, and EKG for preoperative clearance.  Testing is stable.  No acute concerns at this time prior to surgery.  Patient is approved for surgery.    History of vitamin B12 deficiency and obesity: Currently stable.  No acute concerns at this time.  No medication changes are warranted at this time.       Risks and Recommendations:  The patient has the following additional risks and recommendations for perioperative complications:   - No identified additional risk factors other than previously addressed    Antiplatelet or Anticoagulation Medication Instructions:   - aspirin: Discontinue aspirin 7-10 days prior to procedure to  reduce bleeding risk. It should be resumed postoperatively.     Additional Medication Instructions:  Patient Instructions   Patient should take the following medications the morning of surgery with a small sip of water: hold all meds.  Patient was instructed to hold the following medications the morning of surgery: hold all meds.     Patient was advised to call our office and the surgical services with any change in condition or new symptoms if they were to develop between today and their surgical date.  Especially any cardiopulmonary symptoms or symptoms concerning for an infection.     Discontinue aspirin, aleve, naproxen and ibuprofen 7 days prior to surgery to reduce bleeding risk.  It is fine to take tylenol the week before surgery.  Hold vitamins and herbal remedies for 14 days before surgery.     Preparing for Your Surgery  Getting started  A nurse will call you to review your health history and instructions. They will give you an arrival time based on your scheduled surgery time. Please be ready to share:  Your doctor's clinic name and phone number  Your medical, surgical, and anesthesia history  A list of allergies and sensitivities  A list of medicines, including herbal treatments and over-the-counter drugs  Whether the patient has a legal guardian (ask how to send us the papers in advance)  Please tell us if you're pregnant--or if there's any chance you might be pregnant. Some surgeries may injure a fetus (unborn baby), so they require a pregnancy test. Surgeries that are safe for a fetus don't always need a test, and you can choose whether to have one.   If you have a child who's having surgery, please ask for a copy of Preparing for Your Child's Surgery.    Preparing for surgery  Within 10 to 30 days of surgery: Have a pre-op exam (sometimes called an H&P, or History and Physical). This can be done at a clinic or pre-operative center.  If you're having a , you may not need this exam. Talk to  your care team.  At your pre-op exam, talk to your care team about all medicines you take. If you need to stop any medicines before surgery, ask when to start taking them again.  We do this for your safety. Many medicines can make you bleed too much during surgery. Some change how well surgery (anesthesia) drugs work.  Call your insurance company to let them know you're having surgery. (If you don't have insurance, call [phone].)  Call your clinic if there's any change in your health. This includes signs of a cold or flu (sore throat, runny nose, cough, rash, fever). It also includes a scrape or scratch near the surgery site.  If you have questions on the day of surgery, call your hospital or surgery center.  Eating and drinking guidelines  For your safety: Unless your surgeon tells you otherwise, follow the guidelines below.  Eat and drink as usual until 8 hours before you arrive for surgery. After that, no food or milk.  Drink clear liquids until 2 hours before you arrive. These are liquids you can see through, like water, Gatorade, and Propel Water. They also include plain black coffee and tea (no cream or milk), candy, and breath mints. You can spit out gum when you arrive.  If you drink alcohol: Stop drinking it the night before surgery.  If your care team tells you to take medicine on the morning of surgery, it's okay to take it with a sip of water.  Preventing infection  Shower or bathe the night before and morning of your surgery. Follow the instructions your clinic gave you. (If no instructions, use regular soap.)  Don't shave or clip hair near your surgery site. We'll remove the hair if needed.  Don't smoke or vape the morning of surgery. You may chew nicotine gum up to 2 hours before surgery. A nicotine patch is okay.  Note: Some surgeries require you to completely quit smoking and nicotine. Check with your surgeon.  Your care team will make every effort to keep you safe from infection. We  will:  Clean our hands often with soap and water (or an alcohol-based hand rub).  Clean the skin at your surgery site with a special soap that kills germs.  Give you a special gown to keep you warm. (Cold raises the risk of infection.)  Wear special hair covers, masks, gowns and gloves during surgery.  Give antibiotic medicine, if prescribed. Not all surgeries need antibiotics.  What to bring on the day of surgery  Photo ID and insurance card  Copy of your health care directive, if you have one  Glasses and hearing aids (bring cases)  You can't wear contacts during surgery  Inhaler and eye drops, if you use them (tell us about these when you arrive)  CPAP machine or breathing device, if you use them  A few personal items, if spending the night  If you have . . .  A pacemaker, ICD (cardiac defibrillator) or other implant: Bring the ID card.  An implanted stimulator: Bring the remote control.  A legal guardian: Bring a copy of the certified (court-stamped) guardianship papers.  Please remove any jewelry, including body piercings. Leave jewelry and other valuables at home.  If you're going home the day of surgery  You must have a responsible adult drive you home. They should stay with you overnight as well.  If you don't have someone to stay with you, and you aren't safe to go home alone, we may keep you overnight. Insurance often won't pay for this.  After surgery  If it's hard to control your pain or you need more pain medicine, please call your surgeon's office.  Questions?   If you have any questions for your care team, list them here: _________________________________________________________________________________________________________________________________________________________________________ ____________________________________ ____________________________________ ____________________________________  For informational purposes only. Not to replace the advice of your health care provider. Copyright    2003, 2019 [facility]. All rights reserved. Clinically reviewed by Padmini Jackman MD. Trigemina [id number] - REV 12/22.    How to Take Your Medication Before Surgery  - Take all of your medications before surgery except as noted below  - HOLD (do not take) Aspirin for 10 days  - STOP taking all vitamins and herbal supplements 14 days before surgery.     RECOMMENDATION:  APPROVAL GIVEN to proceed with proposed procedure, without further diagnostic evaluation.    Ordering of each unique test  30 minutes spent by me on the date of the encounter doing chart review, history and exam, documentation and further activities per the note      Subjective       HPI related to upcoming procedure:   Patient has history of having chronic left-sided hip pain.  Uses ice as needed for symptomatic relief.  Pain is present on the left lateral hip.  Patient states that pain has been present for at least 3 years.  Has had a decline in overall function due to the hip pain.  Recent x-ray showed moderately severe degenerative changes in both hips.        10/13/2023    10:31 AM   Preop Questions   1. Have you ever had a heart attack or stroke? No   2. Have you ever had surgery on your heart or blood vessels, such as a stent placement, a coronary artery bypass, or surgery on an artery in your head, neck, heart, or legs? No   3. Do you have chest pain with activity? No   4. Do you have a history of  heart failure? No   5. Do you currently have a cold, bronchitis or symptoms of other infection? No   6. Do you have a cough, shortness of breath, or wheezing? No   7. Do you or anyone in your family have previous history of blood clots? UNKNOWN - not that she knows of   8. Do you or does anyone in your family have a serious bleeding problem such as prolonged bleeding following surgeries or cuts? No   9. Have you ever had problems with anemia or been told to take iron pills? No   10. Have you had any abnormal blood loss such as black, tarry  or bloody stools, or abnormal vaginal bleeding? No   11. Have you ever had a blood transfusion? No   12. Are you willing to have a blood transfusion if it is medically needed before, during, or after your surgery? Yes   13. Have you or any of your relatives ever had problems with anesthesia? UNKNOWN - not that she knows of   14. Do you have sleep apnea, excessive snoring or daytime drowsiness? No   15. Do you have any artifical heart valves or other implanted medical devices like a pacemaker, defibrillator, or continuous glucose monitor? No   16. Do you have artificial joints? No   17. Are you allergic to latex? No       Health Care Directive:  Patient has a Health Care Directive on file      Preoperative Review of :   reviewed - no record of controlled substances prescribed.      Status of Chronic Conditions:  Patient has been doing well.  History of vitamin B12 deficiency.  Currently takes vitamin B12 supplementation for maintenance.  History of obesity.  Stable.  No acute concerns at this time.  Patient has tolerated surgery well in the past.  No side effects noted.    Patient has tolerated surgery well in the past.  Patient has no recent cough or cold symptoms.  No recent fevers, chills, sore throat, ear pain, chest pain, palpitations, problems breathing, stomachaches, nausea, vomiting, diarrhea, constipation, blood in stool or urine, dysuria, frequency, urgency.    Patient can walk up a flight of stairs without becoming short of breath or having chest pain: YES   Patient is able to tolerate greater than 4 METs of activity without any cardiopulmonary symptoms.    Review of Systems  CONSTITUTIONAL: NEGATIVE for fever, chills, change in weight  INTEGUMENTARY/SKIN: NEGATIVE for worrisome rashes, moles or lesions  EYES: NEGATIVE for vision changes or irritation  ENT/MOUTH: NEGATIVE for ear, mouth and throat problems  RESP: NEGATIVE for significant cough or SOB  CV: NEGATIVE for chest pain, palpitations or  peripheral edema  GI: NEGATIVE for nausea, abdominal pain, heartburn, or change in bowel habits  : NEGATIVE for frequency, dysuria, or hematuria  MUSCULOSKELETAL: NEGATIVE for significant arthralgias or myalgia  NEURO: NEGATIVE for weakness, dizziness or paresthesias  ENDOCRINE: NEGATIVE for temperature intolerance, skin/hair changes  HEME: NEGATIVE for bleeding problems  PSYCHIATRIC: NEGATIVE for changes in mood or affect    Patient Active Problem List    Diagnosis Date Noted    Vitamin B12 deficiency 2019     Priority: Medium    Osteoarthritis of left hip 2019     Priority: Medium    H/O adenomatous polyp of colon 10/01/2018     Priority: Medium    Healthcare maintenance 2018     Priority: Medium    Obesity 2018     Priority: Medium      Past Medical History:   Diagnosis Date    Asymptomatic menopausal state     approximately age 47    GI bleed     Nevus of nose      Past Surgical History:   Procedure Laterality Date    ANKLE SURGERY      achilles tendon repair and heel spur removal     SECTION      x three    COLONOSCOPY      10,Next colonoscopy due in 2018.    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     Current Outpatient Medications   Medication Sig Dispense Refill    aspirin (ASA) 81 MG EC tablet Take 1 tablet (81 mg) by mouth daily      atorvastatin (LIPITOR) 10 MG tablet Take 1 tablet (10 mg) by mouth daily 90 tablet 3    bisacodyl (DULCOLAX) 5 MG EC tablet Use as directed per colonoscopy prep. 2 tablet 0    Calcium Polycarbophil (FIBER) 625 MG tablet Take by mouth daily      cyanocobalamin (VITAMIN B-12) 1000 MCG tablet Take 1 tablet (1,000 mcg) by mouth daily      MAGNESIUM PO Take 1 tablet by mouth daily         No Known Allergies     Social History  "    Tobacco Use    Smoking status: Never     Passive exposure: Past    Smokeless tobacco: Never    Tobacco comments:     no ecig   Substance Use Topics    Alcohol use: Not Currently     Family History   Problem Relation Age of Onset    Breast Cancer Mother         Cancer-breast,living in her 80s    Cancer Mother         Cancer,kidney cancer    Other - See Comments Mother         eye cancer    Heart Disease Father         MI    Heart Failure Father     Cancer Sister         Cancer,lung cancer, was a smoker    Cancer Sister         Cancer,lung CA    Other - See Comments Sister         hysterectomy    Family History Negative Sister         Good Health    Lung Cancer Sister     Lung Cancer Sister     Heart Disease Brother         enlarged heart    Family History Negative Brother         Good Health    Thyroid Disease Maternal Grandmother     Family History Negative Daughter         Good Health,02/28/80.    Family History Negative Daughter         Good Health,01/24/84.    Family History Negative Son         Good Health,03/21/72.    Family History Negative Other         Good Health,date of birth 12/04[birth year]51.     History   Drug Use No         Objective     /80   Pulse 73   Temp 97.9  F (36.6  C) (Tympanic)   Resp 20   Ht 1.651 m (5' 5\")   Wt 82.1 kg (181 lb)   LMP 08/16/2003 (Approximate)   SpO2 98%   BMI 30.12 kg/m      Physical Exam    GENERAL APPEARANCE: healthy, alert and no distress     EYES: EOMI, PERRL     HENT: ear canals and TM's normal and nose and mouth without ulcers or lesions     NECK: no adenopathy, no asymmetry, masses, or scars and thyroid normal to palpation     RESP: lungs clear to auscultation - no rales, rhonchi or wheezes     CV: regular rates and rhythm, normal S1 S2, no S3 or S4 and no murmur, click or rub     ABDOMEN:  soft, nontender, no HSM or masses and bowel sounds normal     MS: extremities normal- no gross deformities noted, no evidence of inflammation in joints, FROM in all " extremities.     SKIN: no suspicious lesions or rashes     NEURO: Normal strength and tone, sensory exam grossly normal, mentation intact and speech normal     PSYCH: mentation appears normal. and affect normal/bright     LYMPHATICS: No cervical adenopathy    Recent Labs   Lab Test 08/16/23  0959 06/27/22  1226   HGB 14.1 14.8    272    142   POTASSIUM 4.2 3.9   CR 0.95 0.94   A1C 5.7 5.7        Diagnostics:  Recent Results (from the past 720 hour(s))   UA reflex to Microscopic    Collection Time: 10/20/23  1:31 PM   Result Value Ref Range    Color Urine Yellow Colorless, Straw, Light Yellow, Yellow    Appearance Urine Clear Clear    Glucose Urine Negative Negative mg/dL    Bilirubin Urine Negative Negative    Ketones Urine Negative Negative mg/dL    Specific Gravity Urine 1.006 1.000 - 1.030    Blood Urine Negative Negative    pH Urine 5.0 5.0 - 9.0    Protein Albumin Urine Negative Negative mg/dL    Urobilinogen Urine Normal Normal, 2.0 mg/dL    Nitrite Urine Negative Negative    Leukocyte Esterase Urine Negative Negative   Hemoglobin    Collection Time: 10/20/23  1:31 PM   Result Value Ref Range    Hemoglobin 14.7 11.7 - 15.7 g/dL   Basic Metabolic Panel    Collection Time: 10/20/23  1:31 PM   Result Value Ref Range    Sodium 138 135 - 145 mmol/L    Potassium 4.0 3.4 - 5.3 mmol/L    Chloride 103 98 - 107 mmol/L    Carbon Dioxide (CO2) 25 22 - 29 mmol/L    Anion Gap 10 7 - 15 mmol/L    Urea Nitrogen 13.9 8.0 - 23.0 mg/dL    Creatinine 0.92 0.51 - 0.95 mg/dL    GFR Estimate 67 >60 mL/min/1.73m2    Calcium 9.5 8.8 - 10.2 mg/dL    Glucose 101 (H) 70 - 99 mg/dL   EKG 12-lead, tracing only    Collection Time: 10/20/23  1:40 PM   Result Value Ref Range    Systolic Blood Pressure  mmHg    Diastolic Blood Pressure  mmHg    Ventricular Rate 66 BPM    Atrial Rate 66 BPM    MS Interval 134 ms    QRS Duration 92 ms     ms    QTc 417 ms    P Axis 54 degrees    R AXIS -62 degrees    T Axis 25 degrees     Interpretation ECG       Sinus rhythm  Left anterior fascicular block  Abnormal ECG  No previous ECGs available        EKG: Normal Sinus Rhythm, left anterior fascicular block, unchanged from previous tracings    Revised Cardiac Risk Index (RCRI):  The patient has the following serious cardiovascular risks for perioperative complications:   - No serious cardiac risks = 0 points     RCRI Interpretation: 0 points: Class I (very low risk - 0.4% complication rate)         Signed Electronically by: Eva Esqueda PA-C  Copy of this evaluation report is provided to requesting physician.

## 2023-10-20 NOTE — PATIENT INSTRUCTIONS
Patient should take the following medications the morning of surgery with a small sip of water: hold all meds.  Patient was instructed to hold the following medications the morning of surgery: hold all meds.     Patient was advised to call our office and the surgical services with any change in condition or new symptoms if they were to develop between today and their surgical date.  Especially any cardiopulmonary symptoms or symptoms concerning for an infection.     Discontinue aspirin, aleve, naproxen and ibuprofen 7 days prior to surgery to reduce bleeding risk.  It is fine to take tylenol the week before surgery.  Hold vitamins and herbal remedies for 14 days before surgery.     Preparing for Your Surgery  Getting started  A nurse will call you to review your health history and instructions. They will give you an arrival time based on your scheduled surgery time. Please be ready to share:  Your doctor's clinic name and phone number  Your medical, surgical, and anesthesia history  A list of allergies and sensitivities  A list of medicines, including herbal treatments and over-the-counter drugs  Whether the patient has a legal guardian (ask how to send us the papers in advance)  Please tell us if you're pregnant--or if there's any chance you might be pregnant. Some surgeries may injure a fetus (unborn baby), so they require a pregnancy test. Surgeries that are safe for a fetus don't always need a test, and you can choose whether to have one.   If you have a child who's having surgery, please ask for a copy of Preparing for Your Child's Surgery.    Preparing for surgery  Within 10 to 30 days of surgery: Have a pre-op exam (sometimes called an H&P, or History and Physical). This can be done at a clinic or pre-operative center.  If you're having a , you may not need this exam. Talk to your care team.  At your pre-op exam, talk to your care team about all medicines you take. If you need to stop any medicines  before surgery, ask when to start taking them again.  We do this for your safety. Many medicines can make you bleed too much during surgery. Some change how well surgery (anesthesia) drugs work.  Call your insurance company to let them know you're having surgery. (If you don't have insurance, call [phone].)  Call your clinic if there's any change in your health. This includes signs of a cold or flu (sore throat, runny nose, cough, rash, fever). It also includes a scrape or scratch near the surgery site.  If you have questions on the day of surgery, call your hospital or surgery center.  Eating and drinking guidelines  For your safety: Unless your surgeon tells you otherwise, follow the guidelines below.  Eat and drink as usual until 8 hours before you arrive for surgery. After that, no food or milk.  Drink clear liquids until 2 hours before you arrive. These are liquids you can see through, like water, Gatorade, and Propel Water. They also include plain black coffee and tea (no cream or milk), candy, and breath mints. You can spit out gum when you arrive.  If you drink alcohol: Stop drinking it the night before surgery.  If your care team tells you to take medicine on the morning of surgery, it's okay to take it with a sip of water.  Preventing infection  Shower or bathe the night before and morning of your surgery. Follow the instructions your clinic gave you. (If no instructions, use regular soap.)  Don't shave or clip hair near your surgery site. We'll remove the hair if needed.  Don't smoke or vape the morning of surgery. You may chew nicotine gum up to 2 hours before surgery. A nicotine patch is okay.  Note: Some surgeries require you to completely quit smoking and nicotine. Check with your surgeon.  Your care team will make every effort to keep you safe from infection. We will:  Clean our hands often with soap and water (or an alcohol-based hand rub).  Clean the skin at your surgery site with a special  soap that kills germs.  Give you a special gown to keep you warm. (Cold raises the risk of infection.)  Wear special hair covers, masks, gowns and gloves during surgery.  Give antibiotic medicine, if prescribed. Not all surgeries need antibiotics.  What to bring on the day of surgery  Photo ID and insurance card  Copy of your health care directive, if you have one  Glasses and hearing aids (bring cases)  You can't wear contacts during surgery  Inhaler and eye drops, if you use them (tell us about these when you arrive)  CPAP machine or breathing device, if you use them  A few personal items, if spending the night  If you have . . .  A pacemaker, ICD (cardiac defibrillator) or other implant: Bring the ID card.  An implanted stimulator: Bring the remote control.  A legal guardian: Bring a copy of the certified (court-stamped) guardianship papers.  Please remove any jewelry, including body piercings. Leave jewelry and other valuables at home.  If you're going home the day of surgery  You must have a responsible adult drive you home. They should stay with you overnight as well.  If you don't have someone to stay with you, and you aren't safe to go home alone, we may keep you overnight. Insurance often won't pay for this.  After surgery  If it's hard to control your pain or you need more pain medicine, please call your surgeon's office.  Questions?   If you have any questions for your care team, list them here: _________________________________________________________________________________________________________________________________________________________________________ ____________________________________ ____________________________________ ____________________________________  For informational purposes only. Not to replace the advice of your health care provider. Copyright   2003, 2019 [facility]. All rights reserved. Clinically reviewed by Padmini Jackman MD. [employer] 834757 - REV 12/22.    How  to Take Your Medication Before Surgery  - Take all of your medications before surgery except as noted below  - HOLD (do not take) Aspirin for 10 days  - STOP taking all vitamins and herbal supplements 14 days before surgery.

## 2023-10-20 NOTE — H&P (VIEW-ONLY)
[facility]  [address]  [place] [address]  Phone: [phone]  Fax: [phone]  Primary Provider: Amna Mann  Pre-op Performing Provider: KONG PETERS      PREOPERATIVE EVALUATION:  Today's date: 10/20/2023    Libby is a 72 year old female who presents for a preoperative evaluation.      10/20/2023    12:52 PM   Additional Questions   Roomed by Lilliana GUZMAN LPN   Accompanied by self       Surgical Information:  Surgery/Procedure: left total hip arthroplasty  Surgery Location: [facility]  Surgeon: Dr. Hamm   Surgery Date: 11/03/23  Time of Surgery: tbd  Where patient plans to recover: At home with family  Fax number for surgical facility: Note does not need to be faxed, will be available electronically in Epic.    Assessment & Plan     The proposed surgical procedure is considered INTERMEDIATE risk.    Problem List Items Addressed This Visit          Digestive    Obesity    Vitamin B12 deficiency       Musculoskeletal and Integumentary    Osteoarthritis of left hip     Other Visit Diagnoses       Preop general physical exam    -  Primary    Relevant Orders    Hemoglobin (Completed)    Basic Metabolic Panel (Completed)    EKG 12-lead, tracing only (Completed)    UA reflex to Microscopic (Completed)          Complete hemoglobin, BMP, urinalysis, and EKG for preoperative clearance.  Testing is stable.  No acute concerns at this time prior to surgery.  Patient is approved for surgery.    History of vitamin B12 deficiency and obesity: Currently stable.  No acute concerns at this time.  No medication changes are warranted at this time.       Risks and Recommendations:  The patient has the following additional risks and recommendations for perioperative complications:   - No identified additional risk factors other than previously addressed    Antiplatelet or Anticoagulation Medication Instructions:   - aspirin: Discontinue aspirin 7-10 days prior to procedure to  reduce bleeding risk. It should be resumed postoperatively.     Additional Medication Instructions:  Patient Instructions   Patient should take the following medications the morning of surgery with a small sip of water: hold all meds.  Patient was instructed to hold the following medications the morning of surgery: hold all meds.     Patient was advised to call our office and the surgical services with any change in condition or new symptoms if they were to develop between today and their surgical date.  Especially any cardiopulmonary symptoms or symptoms concerning for an infection.     Discontinue aspirin, aleve, naproxen and ibuprofen 7 days prior to surgery to reduce bleeding risk.  It is fine to take tylenol the week before surgery.  Hold vitamins and herbal remedies for 14 days before surgery.     Preparing for Your Surgery  Getting started  A nurse will call you to review your health history and instructions. They will give you an arrival time based on your scheduled surgery time. Please be ready to share:  Your doctor's clinic name and phone number  Your medical, surgical, and anesthesia history  A list of allergies and sensitivities  A list of medicines, including herbal treatments and over-the-counter drugs  Whether the patient has a legal guardian (ask how to send us the papers in advance)  Please tell us if you're pregnant--or if there's any chance you might be pregnant. Some surgeries may injure a fetus (unborn baby), so they require a pregnancy test. Surgeries that are safe for a fetus don't always need a test, and you can choose whether to have one.   If you have a child who's having surgery, please ask for a copy of Preparing for Your Child's Surgery.    Preparing for surgery  Within 10 to 30 days of surgery: Have a pre-op exam (sometimes called an H&P, or History and Physical). This can be done at a clinic or pre-operative center.  If you're having a , you may not need this exam. Talk to  your care team.  At your pre-op exam, talk to your care team about all medicines you take. If you need to stop any medicines before surgery, ask when to start taking them again.  We do this for your safety. Many medicines can make you bleed too much during surgery. Some change how well surgery (anesthesia) drugs work.  Call your insurance company to let them know you're having surgery. (If you don't have insurance, call [phone].)  Call your clinic if there's any change in your health. This includes signs of a cold or flu (sore throat, runny nose, cough, rash, fever). It also includes a scrape or scratch near the surgery site.  If you have questions on the day of surgery, call your hospital or surgery center.  Eating and drinking guidelines  For your safety: Unless your surgeon tells you otherwise, follow the guidelines below.  Eat and drink as usual until 8 hours before you arrive for surgery. After that, no food or milk.  Drink clear liquids until 2 hours before you arrive. These are liquids you can see through, like water, Gatorade, and Propel Water. They also include plain black coffee and tea (no cream or milk), candy, and breath mints. You can spit out gum when you arrive.  If you drink alcohol: Stop drinking it the night before surgery.  If your care team tells you to take medicine on the morning of surgery, it's okay to take it with a sip of water.  Preventing infection  Shower or bathe the night before and morning of your surgery. Follow the instructions your clinic gave you. (If no instructions, use regular soap.)  Don't shave or clip hair near your surgery site. We'll remove the hair if needed.  Don't smoke or vape the morning of surgery. You may chew nicotine gum up to 2 hours before surgery. A nicotine patch is okay.  Note: Some surgeries require you to completely quit smoking and nicotine. Check with your surgeon.  Your care team will make every effort to keep you safe from infection. We  will:  Clean our hands often with soap and water (or an alcohol-based hand rub).  Clean the skin at your surgery site with a special soap that kills germs.  Give you a special gown to keep you warm. (Cold raises the risk of infection.)  Wear special hair covers, masks, gowns and gloves during surgery.  Give antibiotic medicine, if prescribed. Not all surgeries need antibiotics.  What to bring on the day of surgery  Photo ID and insurance card  Copy of your health care directive, if you have one  Glasses and hearing aids (bring cases)  You can't wear contacts during surgery  Inhaler and eye drops, if you use them (tell us about these when you arrive)  CPAP machine or breathing device, if you use them  A few personal items, if spending the night  If you have . . .  A pacemaker, ICD (cardiac defibrillator) or other implant: Bring the ID card.  An implanted stimulator: Bring the remote control.  A legal guardian: Bring a copy of the certified (court-stamped) guardianship papers.  Please remove any jewelry, including body piercings. Leave jewelry and other valuables at home.  If you're going home the day of surgery  You must have a responsible adult drive you home. They should stay with you overnight as well.  If you don't have someone to stay with you, and you aren't safe to go home alone, we may keep you overnight. Insurance often won't pay for this.  After surgery  If it's hard to control your pain or you need more pain medicine, please call your surgeon's office.  Questions?   If you have any questions for your care team, list them here: _________________________________________________________________________________________________________________________________________________________________________ ____________________________________ ____________________________________ ____________________________________  For informational purposes only. Not to replace the advice of your health care provider. Copyright    2003, 2019 [facility]. All rights reserved. Clinically reviewed by Padmini Jackman MD. [employer] [id number] - REV 12/22.    How to Take Your Medication Before Surgery  - Take all of your medications before surgery except as noted below  - HOLD (do not take) Aspirin for 10 days  - STOP taking all vitamins and herbal supplements 14 days before surgery.     RECOMMENDATION:  APPROVAL GIVEN to proceed with proposed procedure, without further diagnostic evaluation.    Ordering of each unique test  30 minutes spent by me on the date of the encounter doing chart review, history and exam, documentation and further activities per the note      Subjective       HPI related to upcoming procedure:   Patient has history of having chronic left-sided hip pain.  Uses ice as needed for symptomatic relief.  Pain is present on the left lateral hip.  Patient states that pain has been present for at least 3 years.  Has had a decline in overall function due to the hip pain.  Recent x-ray showed moderately severe degenerative changes in both hips.        10/13/2023    10:31 AM   Preop Questions   1. Have you ever had a heart attack or stroke? No   2. Have you ever had surgery on your heart or blood vessels, such as a stent placement, a coronary artery bypass, or surgery on an artery in your head, neck, heart, or legs? No   3. Do you have chest pain with activity? No   4. Do you have a history of  heart failure? No   5. Do you currently have a cold, bronchitis or symptoms of other infection? No   6. Do you have a cough, shortness of breath, or wheezing? No   7. Do you or anyone in your family have previous history of blood clots? UNKNOWN - not that she knows of   8. Do you or does anyone in your family have a serious bleeding problem such as prolonged bleeding following surgeries or cuts? No   9. Have you ever had problems with anemia or been told to take iron pills? No   10. Have you had any abnormal blood loss such as black, tarry  or bloody stools, or abnormal vaginal bleeding? No   11. Have you ever had a blood transfusion? No   12. Are you willing to have a blood transfusion if it is medically needed before, during, or after your surgery? Yes   13. Have you or any of your relatives ever had problems with anesthesia? UNKNOWN - not that she knows of   14. Do you have sleep apnea, excessive snoring or daytime drowsiness? No   15. Do you have any artifical heart valves or other implanted medical devices like a pacemaker, defibrillator, or continuous glucose monitor? No   16. Do you have artificial joints? No   17. Are you allergic to latex? No       Health Care Directive:  Patient has a Health Care Directive on file      Preoperative Review of :   reviewed - no record of controlled substances prescribed.      Status of Chronic Conditions:  Patient has been doing well.  History of vitamin B12 deficiency.  Currently takes vitamin B12 supplementation for maintenance.  History of obesity.  Stable.  No acute concerns at this time.  Patient has tolerated surgery well in the past.  No side effects noted.    Patient has tolerated surgery well in the past.  Patient has no recent cough or cold symptoms.  No recent fevers, chills, sore throat, ear pain, chest pain, palpitations, problems breathing, stomachaches, nausea, vomiting, diarrhea, constipation, blood in stool or urine, dysuria, frequency, urgency.    Patient can walk up a flight of stairs without becoming short of breath or having chest pain: YES   Patient is able to tolerate greater than 4 METs of activity without any cardiopulmonary symptoms.    Review of Systems  CONSTITUTIONAL: NEGATIVE for fever, chills, change in weight  INTEGUMENTARY/SKIN: NEGATIVE for worrisome rashes, moles or lesions  EYES: NEGATIVE for vision changes or irritation  ENT/MOUTH: NEGATIVE for ear, mouth and throat problems  RESP: NEGATIVE for significant cough or SOB  CV: NEGATIVE for chest pain, palpitations or  peripheral edema  GI: NEGATIVE for nausea, abdominal pain, heartburn, or change in bowel habits  : NEGATIVE for frequency, dysuria, or hematuria  MUSCULOSKELETAL: NEGATIVE for significant arthralgias or myalgia  NEURO: NEGATIVE for weakness, dizziness or paresthesias  ENDOCRINE: NEGATIVE for temperature intolerance, skin/hair changes  HEME: NEGATIVE for bleeding problems  PSYCHIATRIC: NEGATIVE for changes in mood or affect    Patient Active Problem List    Diagnosis Date Noted    Vitamin B12 deficiency 2019     Priority: Medium    Osteoarthritis of left hip 2019     Priority: Medium    H/O adenomatous polyp of colon 10/01/2018     Priority: Medium    Healthcare maintenance 2018     Priority: Medium    Obesity 2018     Priority: Medium      Past Medical History:   Diagnosis Date    Asymptomatic menopausal state     approximately age 47    GI bleed     Nevus of nose      Past Surgical History:   Procedure Laterality Date    ANKLE SURGERY      achilles tendon repair and heel spur removal     SECTION      x three    COLONOSCOPY      08,Next colonoscopy due in 2018.    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     Current Outpatient Medications   Medication Sig Dispense Refill    aspirin (ASA) 81 MG EC tablet Take 1 tablet (81 mg) by mouth daily      atorvastatin (LIPITOR) 10 MG tablet Take 1 tablet (10 mg) by mouth daily 90 tablet 3    bisacodyl (DULCOLAX) 5 MG EC tablet Use as directed per colonoscopy prep. 2 tablet 0    Calcium Polycarbophil (FIBER) 625 MG tablet Take by mouth daily      cyanocobalamin (VITAMIN B-12) 1000 MCG tablet Take 1 tablet (1,000 mcg) by mouth daily      MAGNESIUM PO Take 1 tablet by mouth daily         No Known Allergies     Social History  "    Tobacco Use    Smoking status: Never     Passive exposure: Past    Smokeless tobacco: Never    Tobacco comments:     no ecig   Substance Use Topics    Alcohol use: Not Currently     Family History   Problem Relation Age of Onset    Breast Cancer Mother         Cancer-breast,living in her 80s    Cancer Mother         Cancer,kidney cancer    Other - See Comments Mother         eye cancer    Heart Disease Father         MI    Heart Failure Father     Cancer Sister         Cancer,lung cancer, was a smoker    Cancer Sister         Cancer,lung CA    Other - See Comments Sister         hysterectomy    Family History Negative Sister         Good Health    Lung Cancer Sister     Lung Cancer Sister     Heart Disease Brother         enlarged heart    Family History Negative Brother         Good Health    Thyroid Disease Maternal Grandmother     Family History Negative Daughter         Good Health,02/28/80.    Family History Negative Daughter         Good Health,01/24/84.    Family History Negative Son         Good Health,03/21/72.    Family History Negative Other         Good Health,date of birth 12/04[birth year]51.     History   Drug Use No         Objective     /80   Pulse 73   Temp 97.9  F (36.6  C) (Tympanic)   Resp 20   Ht 1.651 m (5' 5\")   Wt 82.1 kg (181 lb)   LMP 08/16/2003 (Approximate)   SpO2 98%   BMI 30.12 kg/m      Physical Exam    GENERAL APPEARANCE: healthy, alert and no distress     EYES: EOMI, PERRL     HENT: ear canals and TM's normal and nose and mouth without ulcers or lesions     NECK: no adenopathy, no asymmetry, masses, or scars and thyroid normal to palpation     RESP: lungs clear to auscultation - no rales, rhonchi or wheezes     CV: regular rates and rhythm, normal S1 S2, no S3 or S4 and no murmur, click or rub     ABDOMEN:  soft, nontender, no HSM or masses and bowel sounds normal     MS: extremities normal- no gross deformities noted, no evidence of inflammation in joints, FROM in all " extremities.     SKIN: no suspicious lesions or rashes     NEURO: Normal strength and tone, sensory exam grossly normal, mentation intact and speech normal     PSYCH: mentation appears normal. and affect normal/bright     LYMPHATICS: No cervical adenopathy    Recent Labs   Lab Test 08/16/23  0959 06/27/22  1226   HGB 14.1 14.8    272    142   POTASSIUM 4.2 3.9   CR 0.95 0.94   A1C 5.7 5.7        Diagnostics:  Recent Results (from the past 720 hour(s))   UA reflex to Microscopic    Collection Time: 10/20/23  1:31 PM   Result Value Ref Range    Color Urine Yellow Colorless, Straw, Light Yellow, Yellow    Appearance Urine Clear Clear    Glucose Urine Negative Negative mg/dL    Bilirubin Urine Negative Negative    Ketones Urine Negative Negative mg/dL    Specific Gravity Urine 1.006 1.000 - 1.030    Blood Urine Negative Negative    pH Urine 5.0 5.0 - 9.0    Protein Albumin Urine Negative Negative mg/dL    Urobilinogen Urine Normal Normal, 2.0 mg/dL    Nitrite Urine Negative Negative    Leukocyte Esterase Urine Negative Negative   Hemoglobin    Collection Time: 10/20/23  1:31 PM   Result Value Ref Range    Hemoglobin 14.7 11.7 - 15.7 g/dL   Basic Metabolic Panel    Collection Time: 10/20/23  1:31 PM   Result Value Ref Range    Sodium 138 135 - 145 mmol/L    Potassium 4.0 3.4 - 5.3 mmol/L    Chloride 103 98 - 107 mmol/L    Carbon Dioxide (CO2) 25 22 - 29 mmol/L    Anion Gap 10 7 - 15 mmol/L    Urea Nitrogen 13.9 8.0 - 23.0 mg/dL    Creatinine 0.92 0.51 - 0.95 mg/dL    GFR Estimate 67 >60 mL/min/1.73m2    Calcium 9.5 8.8 - 10.2 mg/dL    Glucose 101 (H) 70 - 99 mg/dL   EKG 12-lead, tracing only    Collection Time: 10/20/23  1:40 PM   Result Value Ref Range    Systolic Blood Pressure  mmHg    Diastolic Blood Pressure  mmHg    Ventricular Rate 66 BPM    Atrial Rate 66 BPM    MS Interval 134 ms    QRS Duration 92 ms     ms    QTc 417 ms    P Axis 54 degrees    R AXIS -62 degrees    T Axis 25 degrees     Interpretation ECG       Sinus rhythm  Left anterior fascicular block  Abnormal ECG  No previous ECGs available        EKG: Normal Sinus Rhythm, left anterior fascicular block, unchanged from previous tracings    Revised Cardiac Risk Index (RCRI):  The patient has the following serious cardiovascular risks for perioperative complications:   - No serious cardiac risks = 0 points     RCRI Interpretation: 0 points: Class I (very low risk - 0.4% complication rate)         Signed Electronically by: Eva Esqueda PA-C  Copy of this evaluation report is provided to requesting physician.

## 2023-10-20 NOTE — NURSING NOTE
"Chief Complaint   Patient presents with    Pre-Op Exam     DOS 11/03/23 /[facility]/ left total hip arthroplasty/ Dr. Hamm       Initial /80   Pulse 73   Temp 97.9  F (36.6  C) (Tympanic)   Resp 20   Ht 1.651 m (5' 5\")   Wt 82.1 kg (181 lb)   LMP 08/16/2003 (Approximate)   SpO2 98%   BMI 30.12 kg/m   Estimated body mass index is 30.12 kg/m  as calculated from the following:    Height as of this encounter: 1.651 m (5' 5\").    Weight as of this encounter: 82.1 kg (181 lb).  Medication Review: complete    The next two questions are to help us understand your food security.  If you are feeling you need any assistance in this area, we have resources available to support you today.          10/13/2023   SDOH- Food Insecurity   Within the past 12 months, did you worry that your food would run out before you got money to buy more? N   Within the past 12 months, did the food you bought just not last and you didn t have money to get more? N         Health Care Directive:  Patient has a Health Care Directive on file      Nia Gifford LPN      "

## 2023-10-23 LAB
ATRIAL RATE - MUSE: 66 BPM
DIASTOLIC BLOOD PRESSURE - MUSE: NORMAL MMHG
INTERPRETATION ECG - MUSE: NORMAL
P AXIS - MUSE: 54 DEGREES
PR INTERVAL - MUSE: 134 MS
QRS DURATION - MUSE: 92 MS
QT - MUSE: 398 MS
QTC - MUSE: 417 MS
R AXIS - MUSE: -62 DEGREES
SYSTOLIC BLOOD PRESSURE - MUSE: NORMAL MMHG
T AXIS - MUSE: 25 DEGREES
VENTRICULAR RATE- MUSE: 66 BPM

## 2023-10-27 ENCOUNTER — ANESTHESIA (OUTPATIENT)
Dept: SURGERY | Facility: OTHER | Age: 73
End: 2023-10-27
Payer: MEDICARE

## 2023-10-27 ENCOUNTER — ANESTHESIA EVENT (OUTPATIENT)
Dept: SURGERY | Facility: OTHER | Age: 73
End: 2023-10-27
Payer: MEDICARE

## 2023-10-27 ENCOUNTER — HOSPITAL ENCOUNTER (OUTPATIENT)
Facility: OTHER | Age: 73
Discharge: HOME | End: 2023-10-27
Attending: SURGERY | Admitting: SURGERY
Payer: MEDICARE

## 2023-10-27 ENCOUNTER — DOCUMENTATION ONLY (OUTPATIENT)
Dept: OTHER | Facility: CLINIC | Age: 73
End: 2023-10-27

## 2023-10-27 VITALS
BODY MASS INDEX: 29.99 KG/M2 | OXYGEN SATURATION: 95 % | HEART RATE: 76 BPM | RESPIRATION RATE: 12 BRPM | DIASTOLIC BLOOD PRESSURE: 87 MMHG | TEMPERATURE: 97.2 F | SYSTOLIC BLOOD PRESSURE: 136 MMHG | HEIGHT: 65 IN | WEIGHT: 180 LBS

## 2023-10-27 PROCEDURE — 45385 COLONOSCOPY W/LESION REMOVAL: CPT | Mod: PT,XU | Performed by: SURGERY

## 2023-10-27 PROCEDURE — 45382 COLONOSCOPY W/CONTROL BLEED: CPT | Mod: PT | Performed by: SURGERY

## 2023-10-27 PROCEDURE — 45385 COLONOSCOPY W/LESION REMOVAL: CPT

## 2023-10-27 PROCEDURE — 250N000009 HC RX 250

## 2023-10-27 PROCEDURE — 258N000003 HC RX IP 258 OP 636: Performed by: SURGERY

## 2023-10-27 PROCEDURE — 45382 COLONOSCOPY W/CONTROL BLEED: CPT | Mod: PT

## 2023-10-27 PROCEDURE — 250N000011 HC RX IP 250 OP 636

## 2023-10-27 PROCEDURE — 999N000010 HC STATISTIC ANES STAT CODE-CRNA PER MINUTE: Performed by: SURGERY

## 2023-10-27 PROCEDURE — 45385 COLONOSCOPY W/LESION REMOVAL: CPT | Mod: PT | Performed by: SURGERY

## 2023-10-27 PROCEDURE — 45378 DIAGNOSTIC COLONOSCOPY: CPT | Performed by: SURGERY

## 2023-10-27 PROCEDURE — 88305 TISSUE EXAM BY PATHOLOGIST: CPT

## 2023-10-27 RX ORDER — LIDOCAINE 40 MG/G
CREAM TOPICAL
Status: DISCONTINUED | OUTPATIENT
Start: 2023-10-27 | End: 2023-10-27 | Stop reason: HOSPADM

## 2023-10-27 RX ORDER — NALOXONE HYDROCHLORIDE 0.4 MG/ML
0.4 INJECTION, SOLUTION INTRAMUSCULAR; INTRAVENOUS; SUBCUTANEOUS
Status: DISCONTINUED | OUTPATIENT
Start: 2023-10-27 | End: 2023-10-27 | Stop reason: HOSPADM

## 2023-10-27 RX ORDER — LIDOCAINE HYDROCHLORIDE 20 MG/ML
INJECTION, SOLUTION INFILTRATION; PERINEURAL PRN
Status: DISCONTINUED | OUTPATIENT
Start: 2023-10-27 | End: 2023-10-27

## 2023-10-27 RX ORDER — NALOXONE HYDROCHLORIDE 0.4 MG/ML
0.2 INJECTION, SOLUTION INTRAMUSCULAR; INTRAVENOUS; SUBCUTANEOUS
Status: DISCONTINUED | OUTPATIENT
Start: 2023-10-27 | End: 2023-10-27 | Stop reason: HOSPADM

## 2023-10-27 RX ORDER — PROPOFOL 10 MG/ML
INJECTION, EMULSION INTRAVENOUS PRN
Status: DISCONTINUED | OUTPATIENT
Start: 2023-10-27 | End: 2023-10-27

## 2023-10-27 RX ORDER — SODIUM CHLORIDE, SODIUM LACTATE, POTASSIUM CHLORIDE, CALCIUM CHLORIDE 600; 310; 30; 20 MG/100ML; MG/100ML; MG/100ML; MG/100ML
INJECTION, SOLUTION INTRAVENOUS CONTINUOUS
Status: DISCONTINUED | OUTPATIENT
Start: 2023-10-27 | End: 2023-10-27 | Stop reason: HOSPADM

## 2023-10-27 RX ORDER — PROPOFOL 10 MG/ML
INJECTION, EMULSION INTRAVENOUS CONTINUOUS PRN
Status: DISCONTINUED | OUTPATIENT
Start: 2023-10-27 | End: 2023-10-27

## 2023-10-27 RX ORDER — FLUMAZENIL 0.1 MG/ML
0.2 INJECTION, SOLUTION INTRAVENOUS
Status: DISCONTINUED | OUTPATIENT
Start: 2023-10-27 | End: 2023-10-27 | Stop reason: HOSPADM

## 2023-10-27 RX ADMIN — SODIUM CHLORIDE, POTASSIUM CHLORIDE, SODIUM LACTATE AND CALCIUM CHLORIDE: 600; 310; 30; 20 INJECTION, SOLUTION INTRAVENOUS at 08:08

## 2023-10-27 RX ADMIN — LIDOCAINE HYDROCHLORIDE 80 MG: 20 INJECTION, SOLUTION INFILTRATION; PERINEURAL at 08:23

## 2023-10-27 RX ADMIN — PROPOFOL 50 MG: 10 INJECTION, EMULSION INTRAVENOUS at 08:23

## 2023-10-27 RX ADMIN — PROPOFOL 150 MCG/KG/MIN: 10 INJECTION, EMULSION INTRAVENOUS at 08:23

## 2023-10-27 ASSESSMENT — ACTIVITIES OF DAILY LIVING (ADL): ADLS_ACUITY_SCORE: 35

## 2023-10-27 NOTE — OP NOTE
PROCEDURE NOTE    SURGEON:Lance Pearson MD    PRE-OP DIAGNOSIS:  Screening Colonoscopy      POST-OP DIAGNOSIS: Rectal polyp    PROCEDURE:  Colon with snare and bleeding control    SPECIMEN:      ID Type Source Tests Collected by Time Destination   1 : RECTAL POLYP Polyp Rectum SURGICAL PATHOLOGY EXAM Lance Pearson MD 10/27/2023  8:40 AM        ANESTHESIA:  MAC CRNA Independent: Iraj Cohen APRN CRNA   Coverage requested due to age over 70    ESTIMATED BLOOD LOSS: none    COMPLICATIONS:  None    INDICATION FOR THE PROCEDURE: The patient is a 72 year old female. The patient presents with  hx of polyps. I explained to the patient the risks, benefits and alternatives to screening colonoscopy for evaluating for cancer or polyps. We discussed the risks including bleeding, perforation, potential inability to reach the cecum and the risks of sedation. The patient's questions were answered and the patient wished to proceed. Informed consent paperwork was completed.    PROCEDURE: The patient was taken to the endoscopy suite. Appropriate monitors were attached. The patient was placed in the left lateral decubitus position. Timeout was performed confirming the patient's identity and procedure to be performed.  After appropriate sedation was confirmed, digital rectal exam was performed.  There was normal tone and no gross abnormality was noted.  The lubricated colonoscope was introduced into the anus the colon was insufflated with air. The prep quality was adequate. Under direct visualization the scope was advanced to the cecum. The mucosa of the colon was inspected while withdrawing the scope.  No abnormalities were noted. The scope was retroflexed in the rectum and the anorectal junction was inspected. A 6 mm polyp was removed from the distal rectum with snare. This oozed blood and required monopolar cautery.  The scope was returned to a neutral position and the colon was decompressed. The scope was removed. The  patient tolerated the procedure with no immediately apparent complication. The patient was taken to recovery in stable condition.    FOLLOW UP: RECOMMEND high fiber diet, will call with pathology results.     Lance Pearson MD on 10/27/2023 at 8:51 AM

## 2023-10-27 NOTE — DISCHARGE INSTRUCTIONS
[facility]  Adult Discharge Orders & Instructions    ________________________________________________________________          For 12 hours after surgery  Get plenty of rest.  A responsible adult must stay with you for at least 12 hours after you leave the hospital.   You may feel lightheaded.  IF so, sit for a few minutes before standing.  Have someone help you get up.   You may have a slight fever. Call the doctor if your fever is over 101 F (38.3 C) (taken under the tongue) or lasts longer than 24 hours.  You may have a dry mouth, a sore throat, muscle aches or trouble sleeping.  These should go away after 24 hours.  Do not make important or legal decisions.  6.   Do not drive or use heavy equipment.  If you have weakness or tingling, don't drive or use heavy equipment until this feeling goes away.    To contact a doctor, call   [phone]_______________________

## 2023-10-27 NOTE — ANESTHESIA POSTPROCEDURE EVALUATION
Patient: Libby Elliott    Procedure: Procedure(s):  Colonoscopy WITH POLYPECTOMY       Anesthesia Type:  MAC    Note:  Disposition: Outpatient   Postop Pain Control: Uneventful            Sign Out: Well controlled pain   PONV: No   Neuro/Psych: Uneventful            Sign Out: Acceptable/Baseline neuro status   Airway/Respiratory: Uneventful            Sign Out: Acceptable/Baseline resp. status   CV/Hemodynamics: Uneventful            Sign Out: Acceptable CV status; No obvious hypovolemia; No obvious fluid overload   Other NRE: NONE   DID A NON-ROUTINE EVENT OCCUR? No           Last vitals:  Vitals Value Taken Time   /87 10/27/23 0930   Temp 97.2  F (36.2  C) 10/27/23 0852   Pulse 76 10/27/23 0930   Resp 12 10/27/23 0855   SpO2 95 % 10/27/23 0915       Electronically Signed By: ELEANOR Carlisle CRNA  October 27, 2023  10:36 AM

## 2023-10-27 NOTE — INTERVAL H&P NOTE
"I have reviewed the surgical (or preoperative) H&P that is linked to this encounter, and examined the patient. There are no significant changes    Clinical Conditions Present on Arrival:  Clinically Significant Risk Factors Present on Admission                 # Drug Induced Platelet Defect: home medication list includes an antiplatelet medication  # Overweight: Estimated body mass index is 29.95 kg/m  as calculated from the following:    Height as of this encounter: 1.651 m (5' 5\").    Weight as of this encounter: 81.6 kg (180 lb).       "

## 2023-10-27 NOTE — ANESTHESIA CARE TRANSFER NOTE
Patient: Libby THAKUR Elliott    Procedure: Procedure(s):  Colonoscopy WITH POLYPECTOMY       Diagnosis: Encounter for screening colonoscopy [Z12.11]  Diagnosis Additional Information: No value filed.    Anesthesia Type:   MAC     Note:    Oropharynx: oropharynx clear of all foreign objects and spontaneously breathing  Level of Consciousness: drowsy and awake  Oxygen Supplementation: room air    Independent Airway: airway patency satisfactory and stable  Dentition: dentition unchanged  Vital Signs Stable: post-procedure vital signs reviewed and stable  Report to RN Given: handoff report given  Patient transferred to: Phase II    Handoff Report: Identifed the Patient, Identified the Reponsible Provider, Reviewed the pertinent medical history, Discussed the surgical course, Reviewed Intra-OP anesthesia mangement and issues during anesthesia, Set expectations for post-procedure period and Allowed opportunity for questions and acknowledgement of understanding      Vitals:  Vitals Value Taken Time   /83 10/27/23 0852   Temp     Pulse 77 10/27/23 0852   Resp     SpO2 94 % 10/27/23 0853   Vitals shown include unfiled device data.    Electronically Signed By: ELEANOR Carlisle CRNA  October 27, 2023  8:55 AM

## 2023-10-27 NOTE — ANESTHESIA PREPROCEDURE EVALUATION
Anesthesia Pre-Procedure Evaluation    Patient: Libby Elliott   MRN: [id number] : 1950        Procedure : Procedure(s):  Colonoscopy          Past Medical History:   Diagnosis Date    Asymptomatic menopausal state     approximately age 47    GI bleed     Nevus of nose       Past Surgical History:   Procedure Laterality Date    ANKLE SURGERY      achilles tendon repair and heel spur removal     SECTION      x three    COLONOSCOPY      08,Next colonoscopy due in 2018.    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      No Known Allergies   Social History     Tobacco Use    Smoking status: Never     Passive exposure: Past    Smokeless tobacco: Never    Tobacco comments:     no ecig   Substance Use Topics    Alcohol use: Not Currently      Wt Readings from Last 1 Encounters:   10/27/23 81.6 kg (180 lb)        Anesthesia Evaluation   Pt has had prior anesthetic. Type: General.    No history of anesthetic complications       ROS/MED HX  ENT/Pulmonary:  - neg pulmonary ROS     Neurologic:  - neg neurologic ROS     Cardiovascular:     (+)  - -   -  - -                                 Previous cardiac testing   Echo: Date: Results:    Stress Test:  Date: Results:    ECG Reviewed:  Date: 10/20/2023 Results:  SR 66  Cath:  Date: Results:      METS/Exercise Tolerance: >4 METS    Hematologic:  - neg hematologic  ROS     Musculoskeletal:  - neg musculoskeletal ROS     GI/Hepatic:  - neg GI/hepatic ROS     Renal/Genitourinary:  - neg Renal ROS     Endo:  - neg endo ROS   (+)               Obesity,       Psychiatric/Substance Use:  - neg psychiatric ROS     Infectious Disease:  - neg infectious disease ROS     Malignancy:  - neg malignancy ROS     Other:  - neg other ROS   (-) Any chance pregnant  "      Physical Exam    Airway        Mallampati: II   TM distance: > 3 FB   Neck ROM: full   Mouth opening: > 3 cm    Respiratory Devices and Support         Dental       (+) Completely normal teeth      Cardiovascular   cardiovascular exam normal       Rhythm and rate: regular and normal     Pulmonary   pulmonary exam normal        breath sounds clear to auscultation           OUTSIDE LABS:  CBC:   Lab Results   Component Value Date    WBC 7.1 08/16/2023    WBC 7.1 06/27/2022    HGB 14.7 10/20/2023    HGB 14.1 08/16/2023    HCT 43.6 08/16/2023    HCT 45.4 06/27/2022     08/16/2023     06/27/2022     BMP:   Lab Results   Component Value Date     10/20/2023     08/16/2023    POTASSIUM 4.0 10/20/2023    POTASSIUM 4.2 08/16/2023    CHLORIDE 103 10/20/2023    CHLORIDE 109 (H) 08/16/2023    CO2 25 10/20/2023    CO2 26 08/16/2023    BUN 13.9 10/20/2023    BUN 20.2 08/16/2023    CR 0.92 10/20/2023    CR 0.95 08/16/2023     (H) 10/20/2023     (H) 08/16/2023     COAGS: No results found for: \"PTT\", \"INR\", \"FIBR\"  POC: No results found for: \"BGM\", \"HCG\", \"HCGS\"  HEPATIC:   Lab Results   Component Value Date    ALBUMIN 4.4 08/16/2023    PROTTOTAL 6.9 08/16/2023    ALT 37 08/16/2023    AST 27 08/16/2023    ALKPHOS 86 08/16/2023    BILITOTAL 0.6 08/16/2023     OTHER:   Lab Results   Component Value Date    A1C 5.7 08/16/2023    ANITA 9.5 10/20/2023    MAG 2.5 06/17/2019    TSH 2.93 06/27/2022    SED 5 06/27/2022       Anesthesia Plan    ASA Status:  2    NPO Status:  NPO Appropriate    Anesthesia Type: MAC.     - Reason for MAC: straight local not clinically adequate   Induction: Intravenous, Propofol.   Maintenance: TIVA.        Consents    Anesthesia Plan(s) and associated risks, benefits, and realistic alternatives discussed. Questions answered and patient/representative(s) expressed understanding.     - Discussed: Risks, Benefits and Alternatives for BOTH SEDATION and the PROCEDURE were " discussed     - Discussed with:  Patient, Healthcare Power of       - Extended Intubation/Ventilatory Support Discussed: No.      - Patient is DNR/DNI Status: No     Use of blood products discussed: No .     Postoperative Care            Comments:                ELEANOR Carlisle CRNA

## 2023-10-31 LAB
PATH REPORT.COMMENTS IMP SPEC: NORMAL
PATH REPORT.FINAL DX SPEC: NORMAL
PATH REPORT.RELEVANT HX SPEC: NORMAL
PHOTO IMAGE: NORMAL

## 2023-11-02 ENCOUNTER — ANESTHESIA EVENT (OUTPATIENT)
Dept: SURGERY | Facility: OTHER | Age: 73
End: 2023-11-02
Payer: MEDICARE

## 2023-11-03 ENCOUNTER — HOSPITAL ENCOUNTER (OUTPATIENT)
Dept: GENERAL RADIOLOGY | Facility: OTHER | Age: 73
Discharge: HOME | End: 2023-11-03
Attending: ORTHOPAEDIC SURGERY | Admitting: ORTHOPAEDIC SURGERY
Payer: MEDICARE

## 2023-11-03 ENCOUNTER — APPOINTMENT (OUTPATIENT)
Dept: GENERAL RADIOLOGY | Facility: OTHER | Age: 73
End: 2023-11-03
Attending: ORTHOPAEDIC SURGERY
Payer: MEDICARE

## 2023-11-03 ENCOUNTER — ANESTHESIA (OUTPATIENT)
Dept: SURGERY | Facility: OTHER | Age: 73
End: 2023-11-03
Payer: MEDICARE

## 2023-11-03 ENCOUNTER — HOSPITAL ENCOUNTER (OUTPATIENT)
Facility: OTHER | Age: 73
Discharge: HOME | End: 2023-11-05
Attending: ORTHOPAEDIC SURGERY | Admitting: ORTHOPAEDIC SURGERY
Payer: MEDICARE

## 2023-11-03 DIAGNOSIS — M25.552 LEFT HIP PAIN: Primary | ICD-10-CM

## 2023-11-03 DIAGNOSIS — M16.12 PRIMARY OSTEOARTHRITIS OF LEFT HIP: ICD-10-CM

## 2023-11-03 LAB
GLUCOSE BLDC GLUCOMTR-MCNC: 85 MG/DL (ref 70–99)
GLUCOSE BLDC GLUCOMTR-MCNC: 91 MG/DL (ref 70–99)

## 2023-11-03 PROCEDURE — 258N000001 HC RX 258: Performed by: ORTHOPAEDIC SURGERY

## 2023-11-03 PROCEDURE — 258N000003 HC RX IP 258 OP 636: Performed by: NURSE ANESTHETIST, CERTIFIED REGISTERED

## 2023-11-03 PROCEDURE — 999N000157 HC STATISTIC RCP TIME EA 10 MIN

## 2023-11-03 PROCEDURE — 370N000017 HC ANESTHESIA TECHNICAL FEE, PER MIN: Performed by: ORTHOPAEDIC SURGERY

## 2023-11-03 PROCEDURE — 999N000141 HC STATISTIC PRE-PROCEDURE NURSING ASSESSMENT: Performed by: ORTHOPAEDIC SURGERY

## 2023-11-03 PROCEDURE — 99213 OFFICE O/P EST LOW 20 MIN: CPT | Mod: 25 | Performed by: INTERNAL MEDICINE

## 2023-11-03 PROCEDURE — 250N000011 HC RX IP 250 OP 636: Mod: JZ | Performed by: ORTHOPAEDIC SURGERY

## 2023-11-03 PROCEDURE — 360N000084 HC SURGERY LEVEL 4 W/ FLUORO, PER MIN: Performed by: ORTHOPAEDIC SURGERY

## 2023-11-03 PROCEDURE — 250N000011 HC RX IP 250 OP 636

## 2023-11-03 PROCEDURE — 250N000011 HC RX IP 250 OP 636: Performed by: NURSE ANESTHETIST, CERTIFIED REGISTERED

## 2023-11-03 PROCEDURE — 27130 TOTAL HIP ARTHROPLASTY: CPT

## 2023-11-03 PROCEDURE — 258N000003 HC RX IP 258 OP 636: Performed by: ORTHOPAEDIC SURGERY

## 2023-11-03 PROCEDURE — C1776 JOINT DEVICE (IMPLANTABLE): HCPCS | Performed by: ORTHOPAEDIC SURGERY

## 2023-11-03 PROCEDURE — 999N000063 XR PELVIS PORT 1/2 VIEWS

## 2023-11-03 PROCEDURE — 999N000180 XR SURGERY CARM FLUORO LESS THAN 5 MIN

## 2023-11-03 PROCEDURE — 96374 THER/PROPH/DIAG INJ IV PUSH: CPT | Mod: XU

## 2023-11-03 PROCEDURE — 710N000010 HC RECOVERY PHASE 1, LEVEL 2, PER MIN: Performed by: ORTHOPAEDIC SURGERY

## 2023-11-03 PROCEDURE — 82962 GLUCOSE BLOOD TEST: CPT

## 2023-11-03 PROCEDURE — 250N000013 HC RX MED GY IP 250 OP 250 PS 637: Performed by: ORTHOPAEDIC SURGERY

## 2023-11-03 PROCEDURE — 96375 TX/PRO/DX INJ NEW DRUG ADDON: CPT | Mod: XU

## 2023-11-03 PROCEDURE — 250N000009 HC RX 250

## 2023-11-03 PROCEDURE — 64447 NJX AA&/STRD FEMORAL NRV IMG: CPT | Mod: LT

## 2023-11-03 PROCEDURE — 27130 TOTAL HIP ARTHROPLASTY: CPT | Mod: LT | Performed by: ORTHOPAEDIC SURGERY

## 2023-11-03 PROCEDURE — 272N000001 HC OR GENERAL SUPPLY STERILE: Performed by: ORTHOPAEDIC SURGERY

## 2023-11-03 DEVICE — IMPLANTABLE DEVICE
Type: IMPLANTABLE DEVICE | Site: HIP | Status: FUNCTIONAL
Brand: CONTINUUM® TRABECULAR METAL™

## 2023-11-03 DEVICE — IMPLANTABLE DEVICE
Type: IMPLANTABLE DEVICE | Site: HIP | Status: FUNCTIONAL
Brand: BIOLOX® DELTA OPTION

## 2023-11-03 DEVICE — IMPLANTABLE DEVICE
Type: IMPLANTABLE DEVICE | Site: HIP | Status: FUNCTIONAL
Brand: TAPERLOC COMPLETE PRIMARY FEMORAL

## 2023-11-03 DEVICE — IMPLANTABLE DEVICE
Type: IMPLANTABLE DEVICE | Site: HIP | Status: FUNCTIONAL
Brand: CONTINUUM® TRILOGY® ALLOFIT® VIVACIT-E®

## 2023-11-03 RX ORDER — ASPIRIN 81 MG/1
81 TABLET ORAL 2 TIMES DAILY
Qty: 60 TABLET | Refills: 0 | Status: SHIPPED | OUTPATIENT
Start: 2023-11-03 | End: 2024-08-07

## 2023-11-03 RX ORDER — NALOXONE HYDROCHLORIDE 0.4 MG/ML
0.4 INJECTION, SOLUTION INTRAMUSCULAR; INTRAVENOUS; SUBCUTANEOUS
Status: DISCONTINUED | OUTPATIENT
Start: 2023-11-03 | End: 2023-11-05 | Stop reason: HOSPADM

## 2023-11-03 RX ORDER — SODIUM CHLORIDE, SODIUM LACTATE, POTASSIUM CHLORIDE, CALCIUM CHLORIDE 600; 310; 30; 20 MG/100ML; MG/100ML; MG/100ML; MG/100ML
INJECTION, SOLUTION INTRAVENOUS CONTINUOUS
Status: DISCONTINUED | OUTPATIENT
Start: 2023-11-03 | End: 2023-11-04

## 2023-11-03 RX ORDER — ATORVASTATIN CALCIUM 10 MG/1
10 TABLET, FILM COATED ORAL DAILY
Status: DISCONTINUED | OUTPATIENT
Start: 2023-11-04 | End: 2023-11-05 | Stop reason: HOSPADM

## 2023-11-03 RX ORDER — ONDANSETRON 2 MG/ML
4 INJECTION INTRAMUSCULAR; INTRAVENOUS EVERY 6 HOURS PRN
Status: DISCONTINUED | OUTPATIENT
Start: 2023-11-03 | End: 2023-11-05 | Stop reason: HOSPADM

## 2023-11-03 RX ORDER — ASPIRIN 81 MG/1
81 TABLET ORAL 2 TIMES DAILY
Status: DISCONTINUED | OUTPATIENT
Start: 2023-11-04 | End: 2023-11-05 | Stop reason: HOSPADM

## 2023-11-03 RX ORDER — PROCHLORPERAZINE MALEATE 5 MG
5 TABLET ORAL EVERY 6 HOURS PRN
Status: DISCONTINUED | OUTPATIENT
Start: 2023-11-03 | End: 2023-11-05 | Stop reason: HOSPADM

## 2023-11-03 RX ORDER — FENTANYL CITRATE 50 UG/ML
25 INJECTION, SOLUTION INTRAMUSCULAR; INTRAVENOUS EVERY 5 MIN PRN
Status: DISCONTINUED | OUTPATIENT
Start: 2023-11-03 | End: 2023-11-03 | Stop reason: HOSPADM

## 2023-11-03 RX ORDER — PROPOFOL 10 MG/ML
INJECTION, EMULSION INTRAVENOUS CONTINUOUS PRN
Status: DISCONTINUED | OUTPATIENT
Start: 2023-11-03 | End: 2023-11-03

## 2023-11-03 RX ORDER — AMOXICILLIN 250 MG
1 CAPSULE ORAL 2 TIMES DAILY
Status: DISCONTINUED | OUTPATIENT
Start: 2023-11-03 | End: 2023-11-05 | Stop reason: HOSPADM

## 2023-11-03 RX ORDER — PROPOFOL 10 MG/ML
INJECTION, EMULSION INTRAVENOUS PRN
Status: DISCONTINUED | OUTPATIENT
Start: 2023-11-03 | End: 2023-11-03

## 2023-11-03 RX ORDER — ACETAMINOPHEN 325 MG/1
650 TABLET ORAL EVERY 4 HOURS PRN
Qty: 100 TABLET | Refills: 0 | Status: SHIPPED | OUTPATIENT
Start: 2023-11-03 | End: 2024-08-07

## 2023-11-03 RX ORDER — ACETAMINOPHEN 325 MG/1
975 TABLET ORAL EVERY 8 HOURS
Qty: 27 TABLET | Refills: 0 | Status: DISCONTINUED | OUTPATIENT
Start: 2023-11-03 | End: 2023-11-05 | Stop reason: HOSPADM

## 2023-11-03 RX ORDER — SODIUM CHLORIDE, SODIUM LACTATE, POTASSIUM CHLORIDE, CALCIUM CHLORIDE 600; 310; 30; 20 MG/100ML; MG/100ML; MG/100ML; MG/100ML
INJECTION, SOLUTION INTRAVENOUS CONTINUOUS
Status: DISCONTINUED | OUTPATIENT
Start: 2023-11-03 | End: 2023-11-03 | Stop reason: HOSPADM

## 2023-11-03 RX ORDER — CALCIUM POLYCARBOPHIL 625 MG
1 TABLET ORAL DAILY
Status: DISCONTINUED | OUTPATIENT
Start: 2023-11-03 | End: 2023-11-03

## 2023-11-03 RX ORDER — HYDROMORPHONE HCL IN WATER/PF 6 MG/30 ML
0.2 PATIENT CONTROLLED ANALGESIA SYRINGE INTRAVENOUS EVERY 5 MIN PRN
Status: DISCONTINUED | OUTPATIENT
Start: 2023-11-03 | End: 2023-11-03 | Stop reason: HOSPADM

## 2023-11-03 RX ORDER — ONDANSETRON 4 MG/1
4 TABLET, ORALLY DISINTEGRATING ORAL EVERY 8 HOURS PRN
Qty: 10 TABLET | Refills: 0 | Status: SHIPPED | OUTPATIENT
Start: 2023-11-03 | End: 2024-08-07

## 2023-11-03 RX ORDER — HYDROXYZINE HYDROCHLORIDE 10 MG/1
10 TABLET, FILM COATED ORAL EVERY 6 HOURS PRN
Qty: 30 TABLET | Refills: 0 | Status: SHIPPED | OUTPATIENT
Start: 2023-11-03 | End: 2024-08-07

## 2023-11-03 RX ORDER — NALOXONE HYDROCHLORIDE 0.4 MG/ML
0.2 INJECTION, SOLUTION INTRAMUSCULAR; INTRAVENOUS; SUBCUTANEOUS
Status: DISCONTINUED | OUTPATIENT
Start: 2023-11-03 | End: 2023-11-05 | Stop reason: HOSPADM

## 2023-11-03 RX ORDER — LIDOCAINE 40 MG/G
CREAM TOPICAL
Status: DISCONTINUED | OUTPATIENT
Start: 2023-11-03 | End: 2023-11-05 | Stop reason: HOSPADM

## 2023-11-03 RX ORDER — CEFAZOLIN SODIUM/WATER 2 G/20 ML
2 SYRINGE (ML) INTRAVENOUS SEE ADMIN INSTRUCTIONS
Status: DISCONTINUED | OUTPATIENT
Start: 2023-11-03 | End: 2023-11-03 | Stop reason: HOSPADM

## 2023-11-03 RX ORDER — AMOXICILLIN 250 MG
1-2 CAPSULE ORAL 2 TIMES DAILY
Qty: 30 TABLET | Refills: 0 | Status: SHIPPED | OUTPATIENT
Start: 2023-11-03 | End: 2024-08-07

## 2023-11-03 RX ORDER — CEFAZOLIN SODIUM/WATER 2 G/20 ML
2 SYRINGE (ML) INTRAVENOUS EVERY 8 HOURS
Qty: 40 ML | Refills: 0 | Status: COMPLETED | OUTPATIENT
Start: 2023-11-03 | End: 2023-11-04

## 2023-11-03 RX ORDER — ASPIRIN 81 MG/1
81 TABLET ORAL DAILY
Status: DISCONTINUED | OUTPATIENT
Start: 2023-11-03 | End: 2023-11-03

## 2023-11-03 RX ORDER — HYDROXYZINE HYDROCHLORIDE 10 MG/1
10 TABLET, FILM COATED ORAL EVERY 6 HOURS PRN
Status: DISCONTINUED | OUTPATIENT
Start: 2023-11-03 | End: 2023-11-05 | Stop reason: HOSPADM

## 2023-11-03 RX ORDER — BISACODYL 10 MG
10 SUPPOSITORY, RECTAL RECTAL DAILY PRN
Status: DISCONTINUED | OUTPATIENT
Start: 2023-11-03 | End: 2023-11-05 | Stop reason: HOSPADM

## 2023-11-03 RX ORDER — TRANEXAMIC ACID 650 MG/1
1950 TABLET ORAL ONCE
Status: COMPLETED | OUTPATIENT
Start: 2023-11-03 | End: 2023-11-03

## 2023-11-03 RX ORDER — CEFAZOLIN SODIUM/WATER 2 G/20 ML
2 SYRINGE (ML) INTRAVENOUS
Status: COMPLETED | OUTPATIENT
Start: 2023-11-03 | End: 2023-11-03

## 2023-11-03 RX ORDER — OXYCODONE HYDROCHLORIDE 5 MG/1
10 TABLET ORAL EVERY 4 HOURS PRN
Status: DISCONTINUED | OUTPATIENT
Start: 2023-11-03 | End: 2023-11-05 | Stop reason: HOSPADM

## 2023-11-03 RX ORDER — ACETAMINOPHEN 325 MG/1
975 TABLET ORAL ONCE
Status: COMPLETED | OUTPATIENT
Start: 2023-11-03 | End: 2023-11-03

## 2023-11-03 RX ORDER — LIDOCAINE 40 MG/G
CREAM TOPICAL
Status: DISCONTINUED | OUTPATIENT
Start: 2023-11-03 | End: 2023-11-03 | Stop reason: HOSPADM

## 2023-11-03 RX ORDER — UREA 10 %
1000 LOTION (ML) TOPICAL DAILY
Status: DISCONTINUED | OUTPATIENT
Start: 2023-11-03 | End: 2023-11-03

## 2023-11-03 RX ORDER — ACETAMINOPHEN 325 MG/1
650 TABLET ORAL EVERY 4 HOURS PRN
Status: DISCONTINUED | OUTPATIENT
Start: 2023-11-06 | End: 2023-11-05 | Stop reason: HOSPADM

## 2023-11-03 RX ORDER — OXYCODONE HYDROCHLORIDE 5 MG/1
5 TABLET ORAL EVERY 4 HOURS PRN
Status: DISCONTINUED | OUTPATIENT
Start: 2023-11-03 | End: 2023-11-05 | Stop reason: HOSPADM

## 2023-11-03 RX ORDER — FENTANYL CITRATE 50 UG/ML
50 INJECTION, SOLUTION INTRAMUSCULAR; INTRAVENOUS EVERY 5 MIN PRN
Status: DISCONTINUED | OUTPATIENT
Start: 2023-11-03 | End: 2023-11-03 | Stop reason: HOSPADM

## 2023-11-03 RX ORDER — OXYCODONE HYDROCHLORIDE 5 MG/1
5 TABLET ORAL EVERY 4 HOURS PRN
Qty: 20 TABLET | Refills: 0 | Status: SHIPPED | OUTPATIENT
Start: 2023-11-03 | End: 2024-08-07

## 2023-11-03 RX ORDER — ONDANSETRON 2 MG/ML
4 INJECTION INTRAMUSCULAR; INTRAVENOUS EVERY 30 MIN PRN
Status: DISCONTINUED | OUTPATIENT
Start: 2023-11-03 | End: 2023-11-03 | Stop reason: HOSPADM

## 2023-11-03 RX ORDER — ONDANSETRON 4 MG/1
4 TABLET, ORALLY DISINTEGRATING ORAL EVERY 30 MIN PRN
Status: DISCONTINUED | OUTPATIENT
Start: 2023-11-03 | End: 2023-11-03 | Stop reason: HOSPADM

## 2023-11-03 RX ORDER — HYDROMORPHONE HCL IN WATER/PF 6 MG/30 ML
0.2 PATIENT CONTROLLED ANALGESIA SYRINGE INTRAVENOUS
Status: DISCONTINUED | OUTPATIENT
Start: 2023-11-03 | End: 2023-11-05 | Stop reason: HOSPADM

## 2023-11-03 RX ORDER — HYDROMORPHONE HCL IN WATER/PF 6 MG/30 ML
0.4 PATIENT CONTROLLED ANALGESIA SYRINGE INTRAVENOUS
Status: DISCONTINUED | OUTPATIENT
Start: 2023-11-03 | End: 2023-11-05 | Stop reason: HOSPADM

## 2023-11-03 RX ORDER — ONDANSETRON 4 MG/1
4 TABLET, ORALLY DISINTEGRATING ORAL EVERY 6 HOURS PRN
Status: DISCONTINUED | OUTPATIENT
Start: 2023-11-03 | End: 2023-11-05 | Stop reason: HOSPADM

## 2023-11-03 RX ORDER — MAGNESIUM 30 MG
30 TABLET ORAL DAILY
Status: DISCONTINUED | OUTPATIENT
Start: 2023-11-03 | End: 2023-11-03

## 2023-11-03 RX ORDER — DEXAMETHASONE SODIUM PHOSPHATE 10 MG/ML
INJECTION, SOLUTION INTRAMUSCULAR; INTRAVENOUS
Status: COMPLETED | OUTPATIENT
Start: 2023-11-03 | End: 2023-11-03

## 2023-11-03 RX ORDER — LIDOCAINE HYDROCHLORIDE 20 MG/ML
INJECTION, SOLUTION INFILTRATION; PERINEURAL PRN
Status: DISCONTINUED | OUTPATIENT
Start: 2023-11-03 | End: 2023-11-03

## 2023-11-03 RX ORDER — HYDROMORPHONE HCL IN WATER/PF 6 MG/30 ML
0.4 PATIENT CONTROLLED ANALGESIA SYRINGE INTRAVENOUS EVERY 5 MIN PRN
Status: DISCONTINUED | OUTPATIENT
Start: 2023-11-03 | End: 2023-11-03 | Stop reason: HOSPADM

## 2023-11-03 RX ORDER — FENTANYL CITRATE 50 UG/ML
50 INJECTION, SOLUTION INTRAMUSCULAR; INTRAVENOUS ONCE
Status: DISCONTINUED | OUTPATIENT
Start: 2023-11-03 | End: 2023-11-04

## 2023-11-03 RX ORDER — CALCIUM CARBONATE/VITAMIN D3 500-10/5ML
1 LIQUID (ML) ORAL DAILY
COMMUNITY

## 2023-11-03 RX ORDER — POLYETHYLENE GLYCOL 3350 17 G/17G
17 POWDER, FOR SOLUTION ORAL DAILY
Status: DISCONTINUED | OUTPATIENT
Start: 2023-11-04 | End: 2023-11-05 | Stop reason: HOSPADM

## 2023-11-03 RX ORDER — BUPIVACAINE HYDROCHLORIDE 2.5 MG/ML
INJECTION, SOLUTION EPIDURAL; INFILTRATION; INTRACAUDAL
Status: COMPLETED | OUTPATIENT
Start: 2023-11-03 | End: 2023-11-03

## 2023-11-03 RX ORDER — KETOROLAC TROMETHAMINE 15 MG/ML
15 INJECTION, SOLUTION INTRAMUSCULAR; INTRAVENOUS EVERY 6 HOURS
Qty: 4 ML | Refills: 0 | Status: COMPLETED | OUTPATIENT
Start: 2023-11-03 | End: 2023-11-04

## 2023-11-03 RX ADMIN — SENNOSIDES AND DOCUSATE SODIUM 1 TABLET: 50; 8.6 TABLET ORAL at 22:14

## 2023-11-03 RX ADMIN — SODIUM CHLORIDE, SODIUM LACTATE, POTASSIUM CHLORIDE, AND CALCIUM CHLORIDE: 600; 310; 30; 20 INJECTION, SOLUTION INTRAVENOUS at 11:41

## 2023-11-03 RX ADMIN — KETOROLAC TROMETHAMINE 15 MG: 15 INJECTION, SOLUTION INTRAMUSCULAR; INTRAVENOUS at 22:14

## 2023-11-03 RX ADMIN — LIDOCAINE HYDROCHLORIDE 20 MG: 20 INJECTION, SOLUTION INFILTRATION; PERINEURAL at 12:46

## 2023-11-03 RX ADMIN — ACETAMINOPHEN 975 MG: 325 TABLET, FILM COATED ORAL at 11:08

## 2023-11-03 RX ADMIN — SODIUM CHLORIDE, SODIUM LACTATE, POTASSIUM CHLORIDE, AND CALCIUM CHLORIDE: 600; 310; 30; 20 INJECTION, SOLUTION INTRAVENOUS at 13:00

## 2023-11-03 RX ADMIN — ACETAMINOPHEN 975 MG: 325 TABLET, FILM COATED ORAL at 20:01

## 2023-11-03 RX ADMIN — KETOROLAC TROMETHAMINE 15 MG: 15 INJECTION, SOLUTION INTRAMUSCULAR; INTRAVENOUS at 16:01

## 2023-11-03 RX ADMIN — PROPOFOL 20 MG: 10 INJECTION, EMULSION INTRAVENOUS at 12:46

## 2023-11-03 RX ADMIN — MEPIVACAINE HYDROCHLORIDE 2.5 ML: 20 INJECTION, SOLUTION INFILTRATION at 12:57

## 2023-11-03 RX ADMIN — BUPIVACAINE HYDROCHLORIDE 20 ML: 2.5 INJECTION, SOLUTION EPIDURAL; INFILTRATION; INTRACAUDAL; PERINEURAL at 12:04

## 2023-11-03 RX ADMIN — HYDROMORPHONE HYDROCHLORIDE 0.4 MG: 0.2 INJECTION, SOLUTION INTRAMUSCULAR; INTRAVENOUS; SUBCUTANEOUS at 16:30

## 2023-11-03 RX ADMIN — PROPOFOL 150 MCG/KG/MIN: 10 INJECTION, EMULSION INTRAVENOUS at 12:46

## 2023-11-03 RX ADMIN — OXYCODONE HYDROCHLORIDE 5 MG: 5 TABLET ORAL at 17:25

## 2023-11-03 RX ADMIN — OXYCODONE HYDROCHLORIDE 10 MG: 5 TABLET ORAL at 22:14

## 2023-11-03 RX ADMIN — Medication 2 G: at 12:29

## 2023-11-03 RX ADMIN — MIDAZOLAM 2 MG: 1 INJECTION INTRAMUSCULAR; INTRAVENOUS at 12:04

## 2023-11-03 RX ADMIN — SODIUM CHLORIDE, SODIUM LACTATE, POTASSIUM CHLORIDE, AND CALCIUM CHLORIDE: 600; 310; 30; 20 INJECTION, SOLUTION INTRAVENOUS at 16:04

## 2023-11-03 RX ADMIN — TRANEXAMIC ACID 1950 MG: 650 TABLET ORAL at 11:08

## 2023-11-03 RX ADMIN — SODIUM CHLORIDE, SODIUM LACTATE, POTASSIUM CHLORIDE, AND CALCIUM CHLORIDE: 600; 310; 30; 20 INJECTION, SOLUTION INTRAVENOUS at 22:33

## 2023-11-03 RX ADMIN — Medication 2 G: at 20:01

## 2023-11-03 RX ADMIN — SODIUM CHLORIDE, SODIUM LACTATE, POTASSIUM CHLORIDE, AND CALCIUM CHLORIDE: 600; 310; 30; 20 INJECTION, SOLUTION INTRAVENOUS at 12:40

## 2023-11-03 RX ADMIN — DEXAMETHASONE SODIUM PHOSPHATE 10 MG: 10 INJECTION, SOLUTION INTRAMUSCULAR; INTRAVENOUS at 12:04

## 2023-11-03 ASSESSMENT — ACTIVITIES OF DAILY LIVING (ADL)
ADLS_ACUITY_SCORE: 24

## 2023-11-03 NOTE — ANESTHESIA POSTPROCEDURE EVALUATION
Patient: Libby Elliott    Procedure: Procedure(s):  ARTHROPLASTY, HIP, TOTAL, DIRECT ANTERIOR APPROACH       Anesthesia Type:  No value filed.    Note:  Disposition: Admission   Postop Pain Control: Uneventful            Sign Out: Well controlled pain   PONV: No   Neuro/Psych: Uneventful            Sign Out: Acceptable/Baseline neuro status   Airway/Respiratory: Uneventful            Sign Out: Acceptable/Baseline resp. status   CV/Hemodynamics: Uneventful            Sign Out: Acceptable CV status; No obvious hypovolemia; No obvious fluid overload   Other NRE: NONE   DID A NON-ROUTINE EVENT OCCUR?            Last vitals:  Vitals Value Taken Time   /65 11/03/23 1455   Temp 96.7  F (35.9  C) 11/03/23 1445   Pulse 56 11/03/23 1500   Resp 4 11/03/23 1500   SpO2 100 % 11/03/23 1500   Vitals shown include unfiled device data.    Electronically Signed By: ELEANOR De Leon CRNA  November 3, 2023  3:02 PM

## 2023-11-03 NOTE — OP NOTE
Preoperative Diagnosis: Left Hip Osteoarthritis    Postoperative Diagnosis: Left Hip Osteoarthritis    Procedure: Left Direct Anterior Total Hip Arthroplasty    Surgeon: Billy Hamm MD    Assistants: James SANTIAGO    A skilled first assistant was required for patient positioning, retracting, and carrying out the procedure in a safe and effective manner    Anesthesia:   1) Spinal with Sedation  2) Local Injection around surgical site    Findings:    1) Satisfactory KENNY with near equal restoration of leg length and offset postoperatively    2) Adequate hemostasis     Implants:    1) Mikel taperloc Femoral Stem Size 12 Standard Offset   2) Size 50 mm mikel continum Shell   3) Size 36 mm standard poly acetabular insert   4) Size 0 mm 36 ceramicFemoral Head      Estimated Blood Loss: 200 ml    Specimens: None    Drains: None    Complications: None    Indications:   This is a 72 year old patient with long standing Left hip pain and severe osteoarthritis.  They have failed nonoperative treatment including use of NSAIDs; Tylenol; injections and exercise.  Given the continued symptoms they wished to proceed with a hip replacement.  The risks including pain, bleeding, infection, deep vein thrombosis, pulmonary embolism, myocardial infarction, component failure, need for revision operation was discussed with the patient.  The surgical consent was signed and surgical site was marked.  All questions regarding postoperative disposition were answered.      Description of Procedure:   The patient was taken to the operating room and placed under spinal anesthesia.  They were then moved to the [facility] bed and positioned in standard fashion.  The C-arm was used to make a templating radiograph for post reconstruction comparison.  The Left hip was prepped with a Chloraprep wipe and sponge then draped in sterile fashion.  A timeout was called to identify the correct patient and surgical site.  An anterolateral incision and direct  anterior approach to the hip was utilized.  Care was taken to cauterize the circumflex vessels.  A capsulotomy was completed and tag sutures were placed.  The femoral neck was exposed and an osteotomy was completed.  A secondary cut was made to make removing the femoral head/neck easier.  The hip was externally rotated and the labrum excised.  Further release on the proximal femur was completed to improve visualization of the proximal femur later in the case.  The C-arm was used during reaming the acetabulum to check alignment and depth of reaming.  The acetabulum was reamed to a size 49 mm and the acetabular shell was placed.  .  The final 32 mm poly insert was placed and checked for a secure fit.  The traction was released and standard capsular releases were completed on the proximal femur.  The leg was externally rotated 120 degrees and brought into adduction and extension.  Standard retractor placement was utilized.  The femur was prepared with a box osteotome, canal finder and broaching up to a size 12.  The trial implants were placed and the hip was reduced.  C-arm was used to compare to preoperative leg length and offset.  The final size 12 Femoral stem was placed with a size 0 32mm ceramic femoral head and the hip was reduced.  Final C-arm images were used to confirm positioning.  No fractures were identified.  Pulse lavage and betadine irrigation was used.  The capsule was closed with #1 Vicryl sutures.  The tensor fascia was closed with a #1 Vicryl suture in running fashion.  The subcutaneous tissue was closed with a 2-0 Vicryl and staples. An Aquacel dressing was applied.  The patient was awakened and transferred to PACU in stable condition.  A post operative x-ray was taken in PACU.        Postoperative Plan:   Routine KENNY protocol (Weightbearing as tolerated, No hip ROM precautions, PT, Pain control, DVT prophylaxis with Aspirin).  Anticipate discharge in 1-2 days.  Follow-up in  2 weeks in [facility]       Physician assistant statement    A skilled first assistant was necessary for completion of the procedure in a technically feliberto and efficient manner.  He was integral prepping draping retraction and assisting in patient transfers.

## 2023-11-03 NOTE — OR NURSING
PACU Transfer Note    Libby Elliott was transferred to [facility] via bed.  Equipment used for transport:  none.  Accompanied by:  Gary  Prescriptions were: n/a    PACU Respiratory Event Documentation     1) Episodes of Apnea greater than or equal to 10 seconds: 0    2) Bradypnea - less than 8 breaths per minute: 0    3) Pain score on 0 to 10 scale: 0    4) Pain-sedation mismatch (yes or no): no    5) Repeated 02 desaturation less than 90% (yes or no): no    Anesthesia notified? (yes or no): no    Any of the above events occuring repeatedly in separate 30 minute intervals may be considered recurrent PACU respiratory events.    Patient stable and meets phase 1 discharge criteria for transport from PACU.

## 2023-11-03 NOTE — PROVIDER NOTIFICATION
11/03/23 1501   Initial Information   Patient Belongings remains with patient   Patient Belongings Remaining with Patient clothing;cell phone/electronics;vision aids  (. 2 pairs of shoes)   Did you bring any home meds/supplements to the hospital?  No     A               Admission:  I am responsible for any personal items that are not sent to the safe or pharmacy.  [facility] is not responsible for loss, theft or damage of any property in my possession.    Signature:  _________________________________ Date: _______  Time: _____                                              Staff Signature:  ____________________________ Date: ________  Time: _____      2nd Staff person, if patient is unable/unwilling to sign:    Signature: ________________________________ Date: ________  Time: _____     Discharge:  [facility] has returned all of my personal belongings:    Signature: _________________________________ Date: ________  Time: _____                                          Staff Signature:  ____________________________ Date: ________  Time: _____

## 2023-11-03 NOTE — ANESTHESIA CARE TRANSFER NOTE
Patient: Libby Elliott    Procedure: Procedure(s):  ARTHROPLASTY, HIP, TOTAL, DIRECT ANTERIOR APPROACH       Diagnosis: Primary osteoarthritis of left hip [M16.12]  Diagnosis Additional Information: No value filed.    Anesthesia Type:   No value filed.     Note:    Oropharynx: oropharynx clear of all foreign objects and spontaneously breathing  Level of Consciousness: awake and drowsy  Oxygen Supplementation: room air    Independent Airway: airway patency satisfactory and stable  Dentition: dentition unchanged  Vital Signs Stable: post-procedure vital signs reviewed and stable  Report to RN Given: handoff report given  Patient transferred to: PACU    Handoff Report: Identifed the Patient, Identified the Reponsible Provider, Reviewed the pertinent medical history, Discussed the surgical course, Reviewed Intra-OP anesthesia mangement and issues during anesthesia, Set expectations for post-procedure period and Allowed opportunity for questions and acknowledgement of understanding      Vitals:  Vitals Value Taken Time   /66 11/03/23 1428   Temp 96.1  F (35.6  C) 11/03/23 1430   Pulse 63 11/03/23 1430   Resp 10 11/03/23 1430   SpO2 96 % 11/03/23 1430   Vitals shown include unfiled device data.    Electronically Signed By: ELEANOR Carlisle CRNA  November 3, 2023  2:30 PM

## 2023-11-03 NOTE — INTERVAL H&P NOTE
Brief History of Illness:   Libby Elliott is a 72 year old female who was admitted for left hip pain    H&P reviewed and patient examined with no new updates from prior exam

## 2023-11-03 NOTE — PROGRESS NOTES
"[facility]    Medicine Progress Note - Hospitalist Service    Date of Admission:  11/3/2023    Assessment & Plan   Status post left total hip arthroplasty  -PT OT  -Pain management  -Encourage oral-possible discharge in the morning    Hyperlipidemia  -Statin    Osteoarthritis  -Pain control    Monitor and treat all chronic conditions            Diet: Advance Diet as Tolerated: Regular Diet Adult  Discharge Instruction - Regular Diet Adult    DVT Prophylaxis: Per Ortho  Rdz Catheter: Not present  Lines: None     Cardiac Monitoring: None  Code Status: Full Code      Clinically Significant Risk Factors Present on Admission                # Drug Induced Platelet Defect: home medication list includes an antiplatelet medication        # Overweight: Estimated body mass index is 29.95 kg/m  as calculated from the following:    Height as of this encounter: 1.651 m (5' 5\").    Weight as of this encounter: 81.6 kg (180 lb).              Disposition Plan      Expected Discharge Date: 11/04/2023                    Jean Carlos Donahue MD  Hospitalist Service  [facility]  Securely message with [employer] (more info)  Text page via [employer] Paging/Directory   ______________________________________________________________________    Interval History   Patient is a pleasant 72-year-old lady with history of arthritis.  Patient was admitted following left total hip arthroplasty.  Indication for surgery was arthritis and bony spurs.  Patient tolerated procedure very well.  Pain is controlled.  Patient is tolerating orals.    Physical Exam   Vital Signs: Temp: 98.5  F (36.9  C) Temp src: Tympanic BP: 132/77 Pulse: 77   Resp: 16 SpO2: 95 % O2 Device: None (Room air)    Weight: 180 lbs 0 oz    General Appearance: Patient lying in bed not in acute distress  Respiratory: Good air entry bilaterally without any added sounds  Cardiovascular: Regular rate and rhythm with first and second heart sound  GI: " Abdomen is soft with positive bowel sounds  Skin: Left hip dressing clean  Other:        Medical Decision Making       40 MINUTES SPENT BY ME on the date of service doing chart review, history, exam, documentation & further activities per the note.      Data

## 2023-11-03 NOTE — PROGRESS NOTES
Patient able to achieve 1200 mls on the IS.  Currently on room air and requiring nothing from Respiratory at this time.

## 2023-11-03 NOTE — ANESTHESIA PROCEDURE NOTES
PENG Procedure Note    Pre-Procedure   Staff -        CRNA: Iraj Cohen APRN CRNA       Other Anesthesia Staff: Cruz Avina APRN CRNA       Performed By: CRNA and with CRNAs       Location: pre-op       Procedure Start/Stop Times: 11/3/2023 12:04 PM and 11/3/2023 12:09 PM       Pre-Anesthestic Checklist: patient identified, IV checked, site marked, risks and benefits discussed, informed consent, monitors and equipment checked, pre-op evaluation, at physician/surgeon's request and post-op pain management  Timeout:       Correct Patient: Yes        Correct Procedure: Yes        Correct Site: Yes        Correct Position: Yes        Correct Laterality: Yes        Site Marked: Yes  Procedure Documentation  Procedure: PENG       Diagnosis: POST OPERATIVE PAIN MANAGEMENT       Laterality: left       Patient Position: supine       Patient Prep/Sterile Barriers: sterile gloves, mask       Skin prep: Chloraprep       Local skin infiltrated with 0.5 mL of 1% lidocaine.        Needle Type: insulated and short bevel       Needle Gauge: 20.        Needle Length (Inches): 6        Ultrasound guided       1. Ultrasound was used to identify targeted nerve, plexus, vascular marker, or fascial plane and place a needle adjacent to it in real-time.       2. Ultrasound was used to visualize the spread of anesthetic in close proximity to the above referenced structure.       3. A permanent image is entered into the patient's record.       4. The visualized anatomic structures appeared normal.       5. There were no apparent abnormal pathologic findings.    Assessment/Narrative         The placement was negative for: blood aspirated, painful injection and site bleeding       Paresthesias: No.       Bolus given via needle..        Secured via.        Insertion/Infusion Method: Single Shot       Complications: none       Injection made incrementally with aspirations every 5 mL.    Medication(s) Administered   Bupivacaine 0.25% PF  "(Infiltration) - Infiltration   20 mL - 11/3/2023 12:04:00 PM  Dexamethasone 10 mg/mL PF (Perineural) - Perineural   10 mg - 11/3/2023 12:04:00 PM  Medication Administration Time: 11/3/2023 12:04 PM      FOR [facility] ONLY:   Pain Team Contact information: please page the Pain Team Via [employer]. Search \"Pain\". During daytime hours, please page the attending first. At night please page the resident first.      "

## 2023-11-03 NOTE — PLAN OF CARE
Goal Outcome Evaluation:  Patient here due to total left hip arthroplasty. Patient has had increasing pain since transfer to floor. Tolerating solids and liquids. LS clear. CMS intact. Dressing clean and intact to left hip. Patient had episode of dizziness when sitting up to use commode, BP 84/50 and increased to 99/55. Pt used bed pan and had minimal amount of output.       Plan of Care Reviewed With: patient    Overall Patient Progress: improvingOverall Patient Progress: improving

## 2023-11-03 NOTE — PROGRESS NOTES
Admission Note    Data:  Libby Elliott admitted to 352 from surgery at 1515.      Action:  Dr. Hamm has been notified of admission. Pt oriented to unit, call light in reach.     Response:  Patient tolerated transfer well.

## 2023-11-03 NOTE — BRIEF OP NOTE
[facility]    Brief Operative Note    Pre-operative diagnosis: Primary osteoarthritis of left hip [M16.12]  Post-operative diagnosis Same as pre-operative diagnosis    Procedure: ARTHROPLASTY, HIP, TOTAL, DIRECT ANTERIOR APPROACH, Left - Hip    Surgeon: Surgeon(s) and Role:     * Billy Hamm MD - Primary     * James Tse PA-C - Assisting  Anesthesia: Spinal with Block   Estimated Blood Loss: 200 ml    Drains: None  Specimens: * No specimens in log *  Findings:   None.  Complications: None.  Implants:   Implant Name Type Inv. Item Serial No.  Lot No. LRB No. Used Action   IMP SHELL ZIM CONTINUUM CLSTR-HL 50MM [facility] -856-01 - [id number] Total Joint Component/Insert IMP SHELL ZIM CONTINUUM CLSTR-HL 50MM [facility] -394-01  [employer] [id number] Left 1 Implanted   IMP LINER ZIM CNTINM VIVACIT-E [facility] 32X50 -154-32 - [id number] Total Joint Component/Insert IMP LINER ZIM CNTINM VIVACIT-E [facility] 32X50 -907-32  [employer] [id number] Left 1 Implanted   IMP STEM FEM BIOM TAPERLOC STD OFFSET TYPE 1 [facility] -120 - [id number] Total Joint Component/Insert IMP STEM FEM BIOM TAPERLOC STD OFFSET TYPE 1 [facility] -120  [employer] [id number] Left 1 Implanted   taper sleeve standard neck    [employer] [id number] Left 1 Implanted   ceramic head 32mm    [employer] [id number] Left 1 Implanted

## 2023-11-03 NOTE — ANESTHESIA PREPROCEDURE EVALUATION
Anesthesia Pre-Procedure Evaluation    Patient: Libby THAKUR [place]   MRN: [id number] : 1950        Procedure : Procedure(s):  ARTHROPLASTY, HIP, TOTAL, DIRECT ANTERIOR APPROACH          Past Medical History:   Diagnosis Date    Asymptomatic menopausal state     approximately age 47    GI bleed     Nevus of nose       Past Surgical History:   Procedure Laterality Date    ANKLE SURGERY      achilles tendon repair and heel spur removal     SECTION      x three    COLONOSCOPY      10,Next colonoscopy due in 2018.    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      No Known Allergies   Social History     Tobacco Use    Smoking status: Never     Passive exposure: Past    Smokeless tobacco: Never    Tobacco comments:     no ecig   Substance Use Topics    Alcohol use: Not Currently      Wt Readings from Last 1 Encounters:   23 81.6 kg (180 lb)        Anesthesia Evaluation   Pt has had prior anesthetic. Type: MAC.        ROS/MED HX  ENT/Pulmonary:  - neg pulmonary ROS     Neurologic:  - neg neurologic ROS     Cardiovascular:  - neg cardiovascular ROS   (+)  - -   -  - -                                 Previous cardiac testing   Echo: Date: Results:    Stress Test:  Date: Results:    ECG Reviewed:  Date: 10-23-23 Results:  Sinus rhythm  Left anterior fascicular block  Abnormal ECG  No previous ECGs available  Confirmed by MD LUTHER, MEETA ([id number]) on 10/23/2023 11:26:15 AM      Cath:  Date: Results:      METS/Exercise Tolerance: >4 METS    Hematologic:  - neg hematologic  ROS     Musculoskeletal: Comment: Spinal stenosis  (+)  arthritis,             GI/Hepatic:  - neg GI/hepatic ROS     Renal/Genitourinary:  - neg Renal ROS    "  Endo:  - neg endo ROS   (+)               Obesity,       Psychiatric/Substance Use:  - neg psychiatric ROS     Infectious Disease:  - neg infectious disease ROS     Malignancy:  - neg malignancy ROS     Other:  - neg other ROS   (-) Any chance pregnant       Physical Exam    Airway        Mallampati: II   TM distance: > 3 FB   Neck ROM: full   Mouth opening: > 3 cm    Respiratory Devices and Support         Dental       (+) Completely normal teeth      Cardiovascular   cardiovascular exam normal       Rhythm and rate: regular and normal     Pulmonary   pulmonary exam normal        breath sounds clear to auscultation           OUTSIDE LABS:  CBC:   Lab Results   Component Value Date    WBC 7.1 08/16/2023    WBC 7.1 06/27/2022    HGB 14.7 10/20/2023    HGB 14.1 08/16/2023    HCT 43.6 08/16/2023    HCT 45.4 06/27/2022     08/16/2023     06/27/2022     BMP:   Lab Results   Component Value Date     10/20/2023     08/16/2023    POTASSIUM 4.0 10/20/2023    POTASSIUM 4.2 08/16/2023    CHLORIDE 103 10/20/2023    CHLORIDE 109 (H) 08/16/2023    CO2 25 10/20/2023    CO2 26 08/16/2023    BUN 13.9 10/20/2023    BUN 20.2 08/16/2023    CR 0.92 10/20/2023    CR 0.95 08/16/2023     (H) 10/20/2023     (H) 08/16/2023     COAGS: No results found for: \"PTT\", \"INR\", \"FIBR\"  POC: No results found for: \"BGM\", \"HCG\", \"HCGS\"  HEPATIC:   Lab Results   Component Value Date    ALBUMIN 4.4 08/16/2023    PROTTOTAL 6.9 08/16/2023    ALT 37 08/16/2023    AST 27 08/16/2023    ALKPHOS 86 08/16/2023    BILITOTAL 0.6 08/16/2023     OTHER:   Lab Results   Component Value Date    A1C 5.7 08/16/2023    ANITA 9.5 10/20/2023    MAG 2.5 06/17/2019    TSH 2.93 06/27/2022    SED 5 06/27/2022       Anesthesia Plan    ASA Status:  2    NPO Status:  NPO Appropriate    Anesthesia Type: Spinal.   Induction: Intravenous, Propofol.   Maintenance: TIVA.        Consents    Anesthesia Plan(s) and associated risks, benefits, and " realistic alternatives discussed. Questions answered and patient/representative(s) expressed understanding.     - Discussed: Risks, Benefits and Alternatives for BOTH SEDATION and the PROCEDURE were discussed     - Discussed with:  Patient, Healthcare Power of       - Extended Intubation/Ventilatory Support Discussed: No.      - Patient is DNR/DNI Status: No     Use of blood products discussed: No .     Postoperative Care    Pain management: Peripheral nerve block (Single Shot), Multi-modal analgesia (Ultrasound guided PENG block).        Comments:                ELEANOR De Leon CRNA

## 2023-11-03 NOTE — ANESTHESIA PROCEDURE NOTES
"Intrathecal injection Procedure Note    Pre-Procedure   Staff -        CRNA: Iraj Cohen APRN CRNA       Performed By: CRNA       Location: OR       Procedure Start/Stop Times: 11/3/2023 12:40 PM and 11/3/2023 12:46 PM       Pre-Anesthestic Checklist: patient identified, IV checked, risks and benefits discussed, informed consent, monitors and equipment checked, pre-op evaluation, at physician/surgeon's request and post-op pain management  Timeout:       Correct Patient: Yes        Correct Procedure: Yes        Correct Site: Yes        Correct Position: Yes   Procedure Documentation  Procedure: intrathecal injection       Diagnosis: Surgical analgesia       Patient Position: sitting       Patient Prep/Sterile Barriers: sterile gloves, mask, patient draped       Skin prep: Chloraprep       Insertion Site: L3-4. (midline approach).       Needle Gauge: 25.        Needle Length (Inches): 3.5        Spinal Needle Type: Parveen tip       Introducer used       Introducer: 20 G       # of attempts: 1 and  # of redirects:  0    Assessment/Narrative         Paresthesias: No.       CSF fluid: clear.       Opening pressure was cmH2O while  Sitting.      Medication(s) Administered   Medication Administration Time: 11/3/2023 12:40 PM      FOR [facility] [place] ONLY:   Pain Team Contact information: please page the Pain Team Via [employer]. Search \"Pain\". During daytime hours, please page the attending first. At night please page the resident first.      "

## 2023-11-04 ENCOUNTER — APPOINTMENT (OUTPATIENT)
Dept: OCCUPATIONAL THERAPY | Facility: OTHER | Age: 73
End: 2023-11-04
Attending: ORTHOPAEDIC SURGERY
Payer: MEDICARE

## 2023-11-04 ENCOUNTER — APPOINTMENT (OUTPATIENT)
Dept: PHYSICAL THERAPY | Facility: OTHER | Age: 73
End: 2023-11-04
Attending: ORTHOPAEDIC SURGERY
Payer: MEDICARE

## 2023-11-04 LAB
ANION GAP SERPL CALCULATED.3IONS-SCNC: 9 MMOL/L (ref 7–15)
BASOPHILS # BLD AUTO: 0 10E3/UL (ref 0–0.2)
BASOPHILS NFR BLD AUTO: 0 %
BUN SERPL-MCNC: 19 MG/DL (ref 8–23)
CALCIUM SERPL-MCNC: 8.8 MG/DL (ref 8.8–10.2)
CHLORIDE SERPL-SCNC: 108 MMOL/L (ref 98–107)
CREAT SERPL-MCNC: 0.76 MG/DL (ref 0.51–0.95)
DEPRECATED HCO3 PLAS-SCNC: 23 MMOL/L (ref 22–29)
EGFRCR SERPLBLD CKD-EPI 2021: 84 ML/MIN/1.73M2
EOSINOPHIL # BLD AUTO: 0 10E3/UL (ref 0–0.7)
EOSINOPHIL NFR BLD AUTO: 0 %
ERYTHROCYTE [DISTWIDTH] IN BLOOD BY AUTOMATED COUNT: 14.5 % (ref 10–15)
GLUCOSE BLDC GLUCOMTR-MCNC: 166 MG/DL (ref 70–99)
GLUCOSE SERPL-MCNC: 191 MG/DL (ref 70–99)
HCT VFR BLD AUTO: 37.9 % (ref 35–47)
HGB BLD-MCNC: 12.3 G/DL (ref 11.7–15.7)
HOLD SPECIMEN: NORMAL
HOLD SPECIMEN: NORMAL
IMM GRANULOCYTES # BLD: 0.1 10E3/UL
IMM GRANULOCYTES NFR BLD: 0 %
LYMPHOCYTES # BLD AUTO: 1.5 10E3/UL (ref 0.8–5.3)
LYMPHOCYTES NFR BLD AUTO: 12 %
MCH RBC QN AUTO: 27.7 PG (ref 26.5–33)
MCHC RBC AUTO-ENTMCNC: 32.5 G/DL (ref 31.5–36.5)
MCV RBC AUTO: 85 FL (ref 78–100)
MONOCYTES # BLD AUTO: 1.2 10E3/UL (ref 0–1.3)
MONOCYTES NFR BLD AUTO: 9 %
NEUTROPHILS # BLD AUTO: 10 10E3/UL (ref 1.6–8.3)
NEUTROPHILS NFR BLD AUTO: 79 %
NRBC # BLD AUTO: 0 10E3/UL
NRBC BLD AUTO-RTO: 0 /100
PLATELET # BLD AUTO: 237 10E3/UL (ref 150–450)
POTASSIUM SERPL-SCNC: 4.5 MMOL/L (ref 3.4–5.3)
RBC # BLD AUTO: 4.44 10E6/UL (ref 3.8–5.2)
SODIUM SERPL-SCNC: 140 MMOL/L (ref 135–145)
WBC # BLD AUTO: 12.8 10E3/UL (ref 4–11)

## 2023-11-04 PROCEDURE — 97530 THERAPEUTIC ACTIVITIES: CPT | Mod: GP

## 2023-11-04 PROCEDURE — 97530 THERAPEUTIC ACTIVITIES: CPT | Mod: GO

## 2023-11-04 PROCEDURE — 258N000003 HC RX IP 258 OP 636: Performed by: ORTHOPAEDIC SURGERY

## 2023-11-04 PROCEDURE — 97165 OT EVAL LOW COMPLEX 30 MIN: CPT | Mod: GO

## 2023-11-04 PROCEDURE — 250N000013 HC RX MED GY IP 250 OP 250 PS 637: Performed by: ORTHOPAEDIC SURGERY

## 2023-11-04 PROCEDURE — 36415 COLL VENOUS BLD VENIPUNCTURE: CPT | Performed by: INTERNAL MEDICINE

## 2023-11-04 PROCEDURE — 80048 BASIC METABOLIC PNL TOTAL CA: CPT | Performed by: INTERNAL MEDICINE

## 2023-11-04 PROCEDURE — 85025 COMPLETE CBC W/AUTO DIFF WBC: CPT | Performed by: INTERNAL MEDICINE

## 2023-11-04 PROCEDURE — 96376 TX/PRO/DX INJ SAME DRUG ADON: CPT

## 2023-11-04 PROCEDURE — 82962 GLUCOSE BLOOD TEST: CPT

## 2023-11-04 PROCEDURE — 99213 OFFICE O/P EST LOW 20 MIN: CPT | Mod: 24 | Performed by: INTERNAL MEDICINE

## 2023-11-04 PROCEDURE — 250N000011 HC RX IP 250 OP 636: Mod: JZ | Performed by: ORTHOPAEDIC SURGERY

## 2023-11-04 PROCEDURE — 97161 PT EVAL LOW COMPLEX 20 MIN: CPT | Mod: GP

## 2023-11-04 PROCEDURE — 97535 SELF CARE MNGMENT TRAINING: CPT | Mod: GO

## 2023-11-04 PROCEDURE — 258N000003 HC RX IP 258 OP 636: Performed by: INTERNAL MEDICINE

## 2023-11-04 RX ORDER — SODIUM CHLORIDE 9 MG/ML
INJECTION, SOLUTION INTRAVENOUS CONTINUOUS
Status: DISCONTINUED | OUTPATIENT
Start: 2023-11-04 | End: 2023-11-05 | Stop reason: HOSPADM

## 2023-11-04 RX ADMIN — SODIUM CHLORIDE: 9 INJECTION, SOLUTION INTRAVENOUS at 13:11

## 2023-11-04 RX ADMIN — SENNOSIDES AND DOCUSATE SODIUM 1 TABLET: 50; 8.6 TABLET ORAL at 10:58

## 2023-11-04 RX ADMIN — SODIUM CHLORIDE 1000 ML: 9 INJECTION, SOLUTION INTRAVENOUS at 09:47

## 2023-11-04 RX ADMIN — KETOROLAC TROMETHAMINE 15 MG: 15 INJECTION, SOLUTION INTRAMUSCULAR; INTRAVENOUS at 10:58

## 2023-11-04 RX ADMIN — OXYCODONE HYDROCHLORIDE 10 MG: 5 TABLET ORAL at 03:12

## 2023-11-04 RX ADMIN — OXYCODONE HYDROCHLORIDE 5 MG: 5 TABLET ORAL at 21:52

## 2023-11-04 RX ADMIN — SODIUM CHLORIDE: 9 INJECTION, SOLUTION INTRAVENOUS at 22:35

## 2023-11-04 RX ADMIN — ASPIRIN 81 MG: 81 TABLET, COATED ORAL at 19:41

## 2023-11-04 RX ADMIN — ACETAMINOPHEN 975 MG: 325 TABLET, FILM COATED ORAL at 10:58

## 2023-11-04 RX ADMIN — ACETAMINOPHEN 975 MG: 325 TABLET, FILM COATED ORAL at 03:12

## 2023-11-04 RX ADMIN — SENNOSIDES AND DOCUSATE SODIUM 1 TABLET: 50; 8.6 TABLET ORAL at 19:41

## 2023-11-04 RX ADMIN — POLYETHYLENE GLYCOL 3350 17 G: 17 POWDER, FOR SOLUTION ORAL at 10:58

## 2023-11-04 RX ADMIN — SODIUM CHLORIDE, SODIUM LACTATE, POTASSIUM CHLORIDE, AND CALCIUM CHLORIDE 1000 ML: 600; 310; 30; 20 INJECTION, SOLUTION INTRAVENOUS at 11:04

## 2023-11-04 RX ADMIN — KETOROLAC TROMETHAMINE 15 MG: 15 INJECTION, SOLUTION INTRAMUSCULAR; INTRAVENOUS at 04:47

## 2023-11-04 RX ADMIN — ASPIRIN 81 MG: 81 TABLET, COATED ORAL at 10:58

## 2023-11-04 RX ADMIN — Medication 2 G: at 04:47

## 2023-11-04 RX ADMIN — ATORVASTATIN CALCIUM 10 MG: 10 TABLET, FILM COATED ORAL at 10:58

## 2023-11-04 RX ADMIN — ACETAMINOPHEN 975 MG: 325 TABLET, FILM COATED ORAL at 19:40

## 2023-11-04 ASSESSMENT — ACTIVITIES OF DAILY LIVING (ADL)
ADLS_ACUITY_SCORE: 28
ADLS_ACUITY_SCORE: 24
ADLS_ACUITY_SCORE: 28
ADLS_ACUITY_SCORE: 28
ADLS_ACUITY_SCORE: 24
ADLS_ACUITY_SCORE: 28
ADLS_ACUITY_SCORE: 24
ADLS_ACUITY_SCORE: 28

## 2023-11-04 NOTE — PROGRESS NOTES
11/04/23 0900   Appointment Info   Signing Clinician's Name / Credentials (PT) Enedina Michel   Living Environment   People in Home alone   Current Living Arrangements house   Home Accessibility stairs to enter home   Number of Stairs, Main Entrance 4  (stairs to accress bedroom, bathroom and shower)   Stair Railings, Main Entrance none   Living Environment Comments bathroom not on main level, does have a commode.  Daughter, Citlali staying for recovery.   Self-Care   Usual Activity Tolerance good   Current Activity Tolerance poor  (experiencing lightheadedness and diaphoresis with sitting and movement')   Equipment Currently Used at Home commode chair;shower chair;walker, standard   General Information   Onset of Illness/Injury or Date of Surgery 11/03/23   Referring Physician Dr. Hamm   Patient/Family Therapy Goals Statement (PT) discharge home when safe   Pertinent History of Current Problem (include personal factors and/or comorbidities that impact the POC) Patient underwent Left total hip. At baseline also has lumbar stenosis and R hip OA as well. At baseline has numbness and tingling in both LEs. Gait in home and community was becoming limited by L hip pain. Patient elected to start with L KENNY and see how much symptoms improve.   General Observations loss of color and increased sweating in seated position   Cognition   Affect/Mental Status (Cognition) WNL   Orientation Status (Cognition) oriented x 4   Follows Commands (Cognition) WNL   Pain Assessment   Patient Currently in Pain No   Range of Motion (ROM)   ROM Comment Functional movement and muscle activation in LEs   Bed Mobility   Comment, (Bed Mobility) Min to Mod A for bed mobility.   Transfers   Comment, (Transfers) Min A for sit to stand transfers. Close monitoring of BP as patient is experiencing symptoms hypotensive moments. Patient in supine 110/46, sitting 97/63. 97/49 with symptoms.   Gait/Stairs (Locomotion)   [place] Level (Gait)  minimum assist (75% patient effort)   Assistive Device (Gait) walker, 4-wheeled   Distance in Feet (Gait) 4   Pattern (Gait) step-to   Comment, (Gait/Stairs) Patient able to take a few steps with good WB through LEs. Gait tolerance limited due to symptomatic hypotension.   Clinical Impression   Criteria for Skilled Therapeutic Intervention Yes, treatment indicated   PT Diagnosis (PT) s/p L KENNY   Influenced by the following impairments decreased activity tolerance   Functional limitations due to impairments limited tolerance for sitting unsupported, standing and gait   Clinical Presentation (PT Evaluation Complexity) evolving  (hypoptension BPs)   Clinical Presentation Rationale clinical judgement   Clinical Decision Making (Complexity) moderate complexity   Planned Therapy Interventions (PT) gait training;balance training;bed mobility training;ROM (range of motion);stair training;transfer training   Risk & Benefits of therapy have been explained evaluation/treatment results reviewed;care plan/treatment goals reviewed   Clinical Impression Comments Patient is currently limited by symptomatic hypotensive BPs. Experiencing lightheadedness and diaphoresis and flushing with sitting unsupported, transfers and standing. Unable to gait train in alvares today or work on stairs. patient has high volume of stairs at home.   PT Total Evaluation Time   PT Eval, Low Complexity Minutes (07714) 20   Physical Therapy Goals   PT Frequency 2x/day   PT Predicted Duration/Target Date for Goal Attainment 11/06/23   PT Goals Bed Mobility;Transfers;Gait;Stairs   PT: Bed Mobility Independent   PT: Transfers Independent   PT: Gait Modified independent   PT: Stairs Supervision/stand-by assist   Interventions   Interventions Quick Adds Therapeutic Activity   Therapeutic Activity   Therapeutic Activities: dynamic activities to improve functional performance Minutes (27373) 20   Symptoms Noted During/After Treatment Fatigue;Dizziness   Treatment  Detail/Skilled Intervention Returned for afternoon session and assisted patient with transfer chair to commode and commode to bed. BPs continue to be hypotensive. Once in bed assisted with positioning for comfort. Patient Min A for transfers and bed mobility in afternoon session.   PT Discharge Planning   PT Plan Home with assist from family   PT Discharge Recommendation (DC Rec) home with assist;home with outpatient physical therapy   PT Rationale for DC Rec Pending improving medical status, patient should improve in activity tolerance and be appropriate for d/c home.   PT Brief overview of current status Patient limited today in activity tolerance   PT Equipment Needed at Discharge   (none. has FWW)   Total Session Time   Timed Code Treatment Minutes 20   Total Session Time (sum of timed and untimed services) 40

## 2023-11-04 NOTE — PROGRESS NOTES
SAFETY CHECKLIST  ID Bands and Risk clasps correct and in place (DNR, Fall risk, Allergy, Latex, Limb):  Yes  All Lines Reconciled and labeled correctly: Yes  Whiteboard updated:Yes  Environmental interventions: Yes  Verify Tele #:  Not on tele

## 2023-11-04 NOTE — PLAN OF CARE
Goal Outcome Evaluation:  Patient is post op day 1 left total hip replacement. Pain has been minimal this shift. Scheduled pain medications administered. BP decreased at side of bed. 1L bolus administered, Pt remained hypotensive when sitting up at edge of bed. Most recent orthostatic BP completed with no dizziness. Pt used commode x2 this shift. Tolerating solid foods and liquids.     Plan of Care Reviewed With: patient

## 2023-11-04 NOTE — PROGRESS NOTES
11/04/23 0900   Appointment Info   Signing Clinician's Name / Credentials (PT) Enedina Michel   Living Environment   People in Home alone   Current Living Arrangements house   Home Accessibility stairs to enter home   Number of Stairs, Main Entrance 4  (stairs to accress bedroom, bathroom and shower)   Stair Railings, Main Entrance none   Living Environment Comments bathroom not on main level, does have a commode.  Daughter, Citlali staying for recovery.   Self-Care   Usual Activity Tolerance good   Current Activity Tolerance poor  (experiencing lightheadedness and diaphoresis with sitting and movement')   Equipment Currently Used at Home commode chair;shower chair;walker, standard   General Information   Onset of Illness/Injury or Date of Surgery 11/03/23   Referring Physician Dr. Hamm   Patient/Family Therapy Goals Statement (PT) discharge home when safe   Pertinent History of Current Problem (include personal factors and/or comorbidities that impact the POC) Patient underwent Left total hip. At baseline also has lumbar stenosis and R hip OA as well. At baseline has numbness and tingling in both LEs. Gait in home and community was becoming limited by L hip pain. Patient elected to start with L KENNY and see how much symptoms improve.   General Observations loss of color and increased sweating in seated position   Cognition   Affect/Mental Status (Cognition) WNL   Orientation Status (Cognition) oriented x 4   Follows Commands (Cognition) WNL   Pain Assessment   Patient Currently in Pain No   Range of Motion (ROM)   ROM Comment Functional movement and muscle activation in LEs   Bed Mobility   Comment, (Bed Mobility) Min to Mod A for bed mobility.   Transfers   Comment, (Transfers) Min A for sit to stand transfers. Close monitoring of BP as patient is experiencing symptoms hypotensive moments. Patient in supine 110/46, sitting 97/63. 97/49 with symptoms.   Gait/Stairs (Locomotion)   [place] Level (Gait)  minimum assist (75% patient effort)   Assistive Device (Gait) walker, 4-wheeled   Distance in Feet (Gait) 4   Pattern (Gait) step-to   Comment, (Gait/Stairs) Patient able to take a few steps with good WB through LEs. Gait tolerance limited due to symptomatic hypotension.   Clinical Impression   Criteria for Skilled Therapeutic Intervention Yes, treatment indicated   PT Diagnosis (PT) s/p L KENNY   Influenced by the following impairments decreased activity tolerance   Functional limitations due to impairments limited tolerance for sitting unsupported, standing and gait   Clinical Presentation (PT Evaluation Complexity) evolving  (hypoptension BPs)   Clinical Presentation Rationale clinical judgement   Clinical Decision Making (Complexity) moderate complexity   Planned Therapy Interventions (PT) gait training;balance training;bed mobility training;ROM (range of motion);stair training;transfer training   Risk & Benefits of therapy have been explained evaluation/treatment results reviewed;care plan/treatment goals reviewed   Clinical Impression Comments Patient is currently limited by symptomatic hypotensive BPs. Experiencing lightheadedness and diaphoresis and flushing with sitting unsupported, transfers and standing. Unable to gait train in alvares today or work on stairs. patient has high volume of stairs at home.   PT Total Evaluation Time   PT Eval, Low Complexity Minutes (91237) 20   Interventions   Interventions Quick Adds Therapeutic Activity   Therapeutic Activity   Therapeutic Activities: dynamic activities to improve functional performance Minutes (96835) 20   Symptoms Noted During/After Treatment Fatigue;Dizziness   Treatment Detail/Skilled Intervention Returned for afternoon session and assisted patient with transfer chair to commode and commode to bed. BPs continue to be hypotensive. Once in bed assisted with positioning for comfort. Patient Min A for transfers and bed mobility in afternoon session.   PT  Discharge Planning   PT Plan Home with assist from family   PT Discharge Recommendation (DC Rec) home with assist;home with outpatient physical therapy   PT Rationale for DC Rec Pending improving medical status, patient should improve in activity tolerance and be appropriate for d/c home.   PT Brief overview of current status Patient limited today in activity tolerance   PT Equipment Needed at Discharge   (none. has FWW)   Total Session Time   Timed Code Treatment Minutes 20   Total Session Time (sum of timed and untimed services) 40

## 2023-11-04 NOTE — PHARMACY-ADMISSION MEDICATION HISTORY
Pharmacy Intern Admission Medication History    Admission medication history is complete. The information provided in this note is only as accurate as the sources available at the time of the update.    Information Source(s): Patient and CareEverywhere/SureScripts via in-person    Pertinent Information: Patient has not taken supplements for some time now. Patient plans to not take Calcium polycarbophil in the future. Plans to start metamucil per providers recommendation.     Changes made to PTA medication list:  Added: None  Deleted: Calcium Polycarbophil   Changed: Magnesium PO added dose (400mg)    Medication Affordability:  Not including over the counter (OTC) medications, was there a time in the past 3 months when you did not take your medications as prescribed because of cost?: No    Allergies reviewed with patient: yes    Medication History Completed By: Killian Wolfe [facility] 11/3/2023 7:05 PM      PTA Med List   Medication Sig Last Dose    acetaminophen (TYLENOL) 325 MG tablet Take 2 tablets (650 mg) by mouth every 4 hours as needed for other (mild pain)     aspirin (ASA) 81 MG EC tablet Take 1 tablet (81 mg) by mouth daily Past Month at AM    aspirin 81 MG EC tablet Take 1 tablet (81 mg) by mouth 2 times daily     atorvastatin (LIPITOR) 10 MG tablet Take 1 tablet (10 mg) by mouth daily 11/2/2023 at PM    Calcium Polycarbophil (FIBER) 625 MG tablet Take by mouth daily Past Month    cyanocobalamin (VITAMIN B-12) 1000 MCG tablet Take 1 tablet (1,000 mcg) by mouth daily Past Month at AM    hydrOXYzine (ATARAX) 10 MG tablet Take 1 tablet (10 mg) by mouth every 6 hours as needed for itching or anxiety (with pain, moderate pain)     magnesium oxide 400 MG CAPS Take 1 capsule by mouth daily Past Month at PM    ondansetron (ZOFRAN ODT) 4 MG ODT tab Take 1 tablet (4 mg) by mouth every 8 hours as needed for nausea Dissolve ON the tongue.     oxyCODONE (ROXICODONE) 5 MG tablet Take 1 tablet (5 mg) by mouth every 4 hours as  needed for moderate to severe pain     senna-docusate (SENOKOT-S/PERICOLACE) 8.6-50 MG tablet Take 1-2 tablets by mouth 2 times daily Take while on oral narcotics to prevent or treat constipation.

## 2023-11-04 NOTE — PLAN OF CARE
No changes to patient condition. BP continues to drop when patient sits at side of bed. Bedrest promoted.

## 2023-11-04 NOTE — PROGRESS NOTES
11/04/23 0900   Appointment Info   Signing Clinician's Name / Credentials (OT) Maria Victoria Herberth, OTR/L   Living Environment   People in Home alone   Current Living Arrangements house   Home Accessibility stairs to enter home   Number of Stairs, Main Entrance 4  (stairs to accress bedroom, bathroom and shower)   Stair Railings, Main Entrance none   Living Environment Comments bathroom not on main level, does have a commode.  Daughter, Citlali staying for recovery.   Self-Care   Usual Activity Tolerance good   Current Activity Tolerance poor  (experiencing lightheadedness and diaphoresis with sitting and movement')   Equipment Currently Used at Home commode chair;shower chair;walker, standard   General Information   Onset of Illness/Injury or Date of Surgery 11/03/23   Referring Physician Dr. Hamm/Dr. Donahue   Left Upper Extremity (Weight-bearing Status) full weight-bearing (FWB)   Right Upper Extremity (Weight-bearing Status) full weight-bearing (FWB)   Left Lower Extremity (Weight-bearing Status) weight-bearing as tolerated (WBAT)   Right Lower Extremity (Weight-bearing Status) full weight-bearing (FWB)   Cognitive Status Examination   Orientation Status orientation to person, place and time   Visual Perception   Visual Impairment/Limitations   (wears glasses)   Pain Assessment   Patient Currently in Pain Yes, see Vital Sign flowsheet   Range of Motion Comprehensive   General Range of Motion bilateral upper extremity ROM WNL   Bed Mobility   Bed Mobility supine-sit;scooting/bridging   Scooting/Bridging [place] (Bed Mobility) moderate assist (50% patient effort)   Supine-Sit [place] (Bed Mobility) moderate assist (50% patient effort)   Assistive Device (Bed Mobility) bed rails;draw sheet   Transfers   Transfers sit-stand transfer   Transfer Comments Completed supine, sitting, and standing blood pressures. See PT note for details.  BP was low and patient experienced dizziness and felt warm.   Sit-Stand  "Transfer   Sit-Stand [place] (Transfers) minimum assist (75% patient effort);2 person assist   Sit/Stand Transfer Comments becomes dizzy or feeling \"icky\"   Clinical Impression   Criteria for Skilled Therapeutic Interventions Met (OT) Yes, treatment indicated   OT Diagnosis Decreased independence with ADLs/IADLs   OT Problem List-Impairments impacting ADL problems related to;activity tolerance impaired;strength;pain;mobility   Assessment of Occupational Performance 1-3 Performance Deficits   Planned Therapy Interventions (OT) ADL retraining;transfer training;balance training   Clinical Decision Making Complexity (OT) problem focused assessment/low complexity   Risk & Benefits of therapy have been explained evaluation/treatment results reviewed;patient;daughter   OT Goals   Therapy Frequency (OT) Daily   OT Predicted Duration/Target Date for Goal Attainment 11/05/23   OT Goals Hygiene/Grooming;Toilet Transfer/Toileting;Lower Body Dressing;Upper Body Dressing   OT: Hygiene/Grooming modified independent   OT: Upper Body Dressing Modified independent   OT: Lower Body Dressing Minimal assist   OT: Toilet Transfer/Toileting Minimal assist   Interventions   Interventions Quick Adds Therapeutic Activity;Self-Care/Home Management   Self-Care/Home Management   Self-Care/Home Mgmt/ADL, Compensatory, Meal Prep Minutes (32905) 15   Symptoms Noted During/After Treatment (Meal Preparation/Planning Training) dizziness;fatigue   Treatment Detail/Skilled Intervention Completed light wash up and oral hygeine while seated upright in bed.   Therapeutic Activities   Therapeutic Activity Minutes (41499) 40   Symptoms noted during/after treatment fatigue;dizziness   Treatment Detail/Skilled Intervention Completed 2 sessions of transfers/mobility.  min to mod A x 2 for bed mobilities and recliner transfer.   OT assists with toilet hygeine following commode use.   OT Discharge Planning   OT Plan Continue OT while in hospital, address " ADLs   OT Discharge Recommendation (DC Rec) home with assist   OT Rationale for DC Rec Patient will miracle have no OT needs at DC.

## 2023-11-04 NOTE — PLAN OF CARE
Patient's left hip pain controlled with scheduled and PRN medications. Cold therapy applied intermittently. Dressing is CDI. CMS intact. When transitioning from laying to sitting position, patient's BP dropped to 68/42. BP zee to 113/63 almost immediately after laying back down. Due to patient's drop in BP when sitting, patient has not been out of bed. Voiding spontaneously with bed pan.

## 2023-11-04 NOTE — PROGRESS NOTES
"[facility]    Medicine Progress Note - Hospitalist Service    Date of Admission:  11/3/2023    Assessment & Plan   Orthostatic hypotension  -With severe dizziness  -Liter IV fluids bolus did not resolve the problem-we will place patient on IV fluid hydration  -We will monitor overnight    Acute blood loss anemia  -Postop related  -We will monitor hemoglobin    Status post left total hip arthroplasty  -PT OT  -Pain management  -Encourage oral-possible discharge in the morning    Hyperlipidemia  -Statin    Osteoarthritis  -Pain control    Monitor and treat all chronic conditions            Diet: Advance Diet as Tolerated: Regular Diet Adult  Discharge Instruction - Regular Diet Adult    DVT Prophylaxis: Per Ortho  Rdz Catheter: Not present  Lines: None     Cardiac Monitoring: None  Code Status: Full Code      Clinically Significant Risk Factors Present on Admission                # Drug Induced Platelet Defect: home medication list includes an antiplatelet medication        # Overweight: Estimated body mass index is 29.95 kg/m  as calculated from the following:    Height as of this encounter: 1.651 m (5' 5\").    Weight as of this encounter: 81.6 kg (180 lb).              Disposition Plan     Expected Discharge Date: 11/04/2023                    Jean Carlos Donahue MD  Hospitalist Service  [facility]  Securely message with [employer] (more info)  Text page via [facility] Paging/Directory   ______________________________________________________________________    Interval History   Patient developed severe dizziness (just sitting up in bed.  Orthostatic blood pressure was negative.  Patient benefited from a liter bolus.  Tach briefly resolved the orthostatic problems but after a while patient again started feeling dizzy and this was confirmed by blood pressure documentation.  We will place patient on 100 mL/h IV fluids.  We will hold discharge.  We will continue all other management.  " Patient was evaluated 3 times today    Physical Exam   Vital Signs: Temp: 97.8  F (36.6  C) Temp src: Tympanic BP: 97/51 Pulse: 62   Resp: 16 SpO2: 91 % O2 Device: None (Room air)    Weight: 180 lbs 0 oz    General Appearance: Patient lying in bed not in acute distress  Respiratory: Good air entry bilaterally without any added sounds  Cardiovascular: Regular rate and rhythm with first and second heart sound  GI: Abdomen is soft with positive bowel sounds  Skin: Left hip dressing clean  Other:        Medical Decision Making       55 MINUTES SPENT BY ME on the date of service doing chart review, history, exam, documentation & further activities per the note.      Data     I have personally reviewed the following data over the past 24 hrs:    12.8 (H)  \   12.3   / 237     140 108 (H) 19.0 /  166 (H)   4.5 23 0.76 \

## 2023-11-04 NOTE — PROVIDER NOTIFICATION
11/03/23 2220   Lying Orthostatic BP   Lying Orthostatic /63   Lying Orthostatic Pulse 67 bpm   Sitting Orthostatic BP   Sitting Orthostatic BP 68/42   Sitting Orthostatic Pulse 80 bpm

## 2023-11-05 ENCOUNTER — APPOINTMENT (OUTPATIENT)
Dept: PHYSICAL THERAPY | Facility: OTHER | Age: 73
End: 2023-11-05
Attending: ORTHOPAEDIC SURGERY
Payer: MEDICARE

## 2023-11-05 ENCOUNTER — APPOINTMENT (OUTPATIENT)
Dept: OCCUPATIONAL THERAPY | Facility: OTHER | Age: 73
End: 2023-11-05
Attending: ORTHOPAEDIC SURGERY
Payer: MEDICARE

## 2023-11-05 VITALS
HEART RATE: 73 BPM | RESPIRATION RATE: 16 BRPM | OXYGEN SATURATION: 94 % | SYSTOLIC BLOOD PRESSURE: 127 MMHG | HEIGHT: 65 IN | BODY MASS INDEX: 29.99 KG/M2 | TEMPERATURE: 97.2 F | WEIGHT: 180 LBS | DIASTOLIC BLOOD PRESSURE: 73 MMHG

## 2023-11-05 LAB
ANION GAP SERPL CALCULATED.3IONS-SCNC: 7 MMOL/L (ref 7–15)
BASOPHILS # BLD AUTO: 0 10E3/UL (ref 0–0.2)
BASOPHILS NFR BLD AUTO: 0 %
BUN SERPL-MCNC: 20.2 MG/DL (ref 8–23)
CALCIUM SERPL-MCNC: 8 MG/DL (ref 8.8–10.2)
CHLORIDE SERPL-SCNC: 113 MMOL/L (ref 98–107)
CREAT SERPL-MCNC: 0.64 MG/DL (ref 0.51–0.95)
DEPRECATED HCO3 PLAS-SCNC: 21 MMOL/L (ref 22–29)
EGFRCR SERPLBLD CKD-EPI 2021: >90 ML/MIN/1.73M2
EOSINOPHIL # BLD AUTO: 0.1 10E3/UL (ref 0–0.7)
EOSINOPHIL NFR BLD AUTO: 1 %
ERYTHROCYTE [DISTWIDTH] IN BLOOD BY AUTOMATED COUNT: 14.9 % (ref 10–15)
GLUCOSE SERPL-MCNC: 134 MG/DL (ref 70–99)
HCT VFR BLD AUTO: 33.9 % (ref 35–47)
HGB BLD-MCNC: 11 G/DL (ref 11.7–15.7)
HOLD SPECIMEN: NORMAL
HOLD SPECIMEN: NORMAL
IMM GRANULOCYTES # BLD: 0 10E3/UL
IMM GRANULOCYTES NFR BLD: 0 %
LYMPHOCYTES # BLD AUTO: 2.1 10E3/UL (ref 0.8–5.3)
LYMPHOCYTES NFR BLD AUTO: 22 %
MCH RBC QN AUTO: 28.2 PG (ref 26.5–33)
MCHC RBC AUTO-ENTMCNC: 32.4 G/DL (ref 31.5–36.5)
MCV RBC AUTO: 87 FL (ref 78–100)
MONOCYTES # BLD AUTO: 1.2 10E3/UL (ref 0–1.3)
MONOCYTES NFR BLD AUTO: 12 %
NEUTROPHILS # BLD AUTO: 6.4 10E3/UL (ref 1.6–8.3)
NEUTROPHILS NFR BLD AUTO: 65 %
NRBC # BLD AUTO: 0 10E3/UL
NRBC BLD AUTO-RTO: 0 /100
PLATELET # BLD AUTO: 190 10E3/UL (ref 150–450)
POTASSIUM SERPL-SCNC: 3.7 MMOL/L (ref 3.4–5.3)
RBC # BLD AUTO: 3.9 10E6/UL (ref 3.8–5.2)
SODIUM SERPL-SCNC: 141 MMOL/L (ref 135–145)
WBC # BLD AUTO: 9.7 10E3/UL (ref 4–11)

## 2023-11-05 PROCEDURE — 97530 THERAPEUTIC ACTIVITIES: CPT | Mod: GP

## 2023-11-05 PROCEDURE — 250N000013 HC RX MED GY IP 250 OP 250 PS 637: Performed by: ORTHOPAEDIC SURGERY

## 2023-11-05 PROCEDURE — 85025 COMPLETE CBC W/AUTO DIFF WBC: CPT | Performed by: INTERNAL MEDICINE

## 2023-11-05 PROCEDURE — 80048 BASIC METABOLIC PNL TOTAL CA: CPT | Performed by: INTERNAL MEDICINE

## 2023-11-05 PROCEDURE — 97530 THERAPEUTIC ACTIVITIES: CPT | Mod: GO

## 2023-11-05 PROCEDURE — 97116 GAIT TRAINING THERAPY: CPT | Mod: GP

## 2023-11-05 PROCEDURE — 99212 OFFICE O/P EST SF 10 MIN: CPT | Mod: 24 | Performed by: INTERNAL MEDICINE

## 2023-11-05 PROCEDURE — 97110 THERAPEUTIC EXERCISES: CPT | Mod: GP

## 2023-11-05 PROCEDURE — 258N000003 HC RX IP 258 OP 636: Performed by: INTERNAL MEDICINE

## 2023-11-05 PROCEDURE — 97535 SELF CARE MNGMENT TRAINING: CPT | Mod: GO

## 2023-11-05 PROCEDURE — 36415 COLL VENOUS BLD VENIPUNCTURE: CPT | Performed by: INTERNAL MEDICINE

## 2023-11-05 RX ADMIN — OXYCODONE HYDROCHLORIDE 10 MG: 5 TABLET ORAL at 09:54

## 2023-11-05 RX ADMIN — SENNOSIDES AND DOCUSATE SODIUM 1 TABLET: 50; 8.6 TABLET ORAL at 09:59

## 2023-11-05 RX ADMIN — SODIUM CHLORIDE: 9 INJECTION, SOLUTION INTRAVENOUS at 06:55

## 2023-11-05 RX ADMIN — ASPIRIN 81 MG: 81 TABLET, COATED ORAL at 09:59

## 2023-11-05 RX ADMIN — ACETAMINOPHEN 975 MG: 325 TABLET, FILM COATED ORAL at 09:59

## 2023-11-05 RX ADMIN — POLYETHYLENE GLYCOL 3350 17 G: 17 POWDER, FOR SOLUTION ORAL at 10:00

## 2023-11-05 RX ADMIN — ATORVASTATIN CALCIUM 10 MG: 10 TABLET, FILM COATED ORAL at 09:59

## 2023-11-05 RX ADMIN — ACETAMINOPHEN 975 MG: 325 TABLET, FILM COATED ORAL at 03:33

## 2023-11-05 ASSESSMENT — ACTIVITIES OF DAILY LIVING (ADL)
ADLS_ACUITY_SCORE: 24

## 2023-11-05 NOTE — PROGRESS NOTES
"Pt is post op day #2, was up today with therapy and had to stop therapy unitl later due to drop in blood pressure which may be pain related. Pt received 10 mg oxy @ 0954 4/10 aching pain to LLE, states @ 1130 no pain. Pt bp dropped significantly when standing up from recliner with PT, PT notified provider of the situation. Expected to discharge if blood pressure is under control. Continuous NS @ 100 mL/hr.Daughter is in room currently visiting. Blood pressure 127/73, pulse 73, temperature 97.2  F (36.2  C), temperature source Tympanic, resp. rate 16, height 1.651 m (5' 5\"), weight 81.6 kg (180 lb), last menstrual period 08/16/2003, SpO2 94%, not currently breastfeeding.   "

## 2023-11-05 NOTE — PHARMACY - DISCHARGE MEDICATION RECONCILIATION AND EDUCATION
Pharmacy:  Discharge Counseling and Medication Reconciliation    Libby Elliott  [address]  [place] [id number] [phone] (home)   72 year old female  PCP: Amna Mann    Allergies: Patient has no known allergies.    Discharge Counseling:    Pharmacist met with patient (and/or family) today to review the medication portion of the After Visit Summary (with an emphasis on NEW medications) and to address patient's questions/concerns.    Summary of Education: Counseled on the appropriate use of opioids for pain management. Explained the dangers of commitment use of opioids with alcohol and activities requiring mental acuity. Included potential side effects of opioids (drowsiness, altered mental status, constipation). Discussed appropriate use of APAP for mild pain. Discussed how to manage opioid/surgery induced constipation with Senna-Doc. Expressed the additive benefit of Hydroxyzine for pain with oxycodone and additive risk of sedation effects. Counseled on importance of aspirin BID post surgery.       Materials Provided:  MedCounselor sheets printed from Clinical Pharmacology on: APAP, ASA, oxycodone, hydroxyzine, senna-doc, zofran     Discharge Medication Reconciliation:    It has been determined that the patient has an adequate supply of medications available or which can be obtained from the patient's preferred pharmacy, which HE/SHE has confirmed as: [facility]     Thank you for the consult.    Killian Wolfe [facility]........November 5, 2023 8:51 AM

## 2023-11-05 NOTE — PLAN OF CARE
"Goal Outcome Evaluation:      Plan of Care Reviewed With: patient    Overall Patient Progress: improvingOverall Patient Progress: improving      Problem: Adult Inpatient Plan of Care  Goal: Plan of Care Review  Description: The Plan of Care Review/Shift note should be completed every shift.  The Outcome Evaluation is a brief statement about your assessment that the patient is improving, declining, or no change.  This information will be displayed automatically on your shift  note.  Outcome: Progressing  Flowsheets (Taken 11/5/2023 1131)  Plan of Care Reviewed With: patient  Overall Patient Progress: improving  Goal: Patient-Specific Goal (Individualized)  Description: You can add care plan individualizations to a care plan. Examples of Individualization might be:  \"Parent requests to be called daily at 9am for status\", \"I have a hard time hearing out of my right ear\", or \"Do not touch me to wake me up as it startles  me\".  Outcome: Progressing  Goal: Absence of Hospital-Acquired Illness or Injury  Outcome: Progressing  Intervention: Identify and Manage Fall Risk  Recent Flowsheet Documentation  Taken 11/5/2023 0804 by Nan Doyle RN  Safety Promotion/Fall Prevention: activity supervised  Intervention: Prevent Skin Injury  Recent Flowsheet Documentation  Taken 11/5/2023 0801 by Nan Doyle RN  Body Position: position changed independently  Intervention: Prevent and Manage VTE (Venous Thromboembolism) Risk  Recent Flowsheet Documentation  Taken 11/5/2023 0804 by Nan Doyle RN  VTE Prevention/Management: SCDs (sequential compression devices) on  Goal: Optimal Comfort and Wellbeing  Outcome: Progressing  Intervention: Monitor Pain and Promote Comfort  Recent Flowsheet Documentation  Taken 11/5/2023 0801 by Nan Doyle RN  Pain Management Interventions: medication (see MAR)  Goal: Readiness for Transition of Care  Outcome: Progressing              "

## 2023-11-05 NOTE — PROGRESS NOTES
[facility] DISCHARGE NOTE    Patient discharged to home at 1:15 PM via wheel chair. Accompanied by daughter and staff. Discharge instructions reviewed with patient and daughter, opportunity offered to ask questions. Prescriptions sent to patients preferred pharmacy. All belongings sent with patient.    Lilia Fisher RN

## 2023-11-05 NOTE — PROGRESS NOTES
11/05/23 1200   Appointment Info   Signing Clinician's Name / Credentials (PT) Enedina Michel   Living Environment   People in Home alone   Current Living Arrangements house   Home Accessibility stairs to enter home   Number of Stairs, Main Entrance   (stairs to accress bedroom, bathroom and shower  Simultaneous filing. User may not have seen previous data.)   Stair Railings, Main Entrance none   Self-Care   Usual Activity Tolerance good   Current Activity Tolerance   (experiencing lightheadedness and diaphoresis with sitting and movement'  Simultaneous filing. User may not have seen previous data.)   Equipment Currently Used at Home commode chair;shower chair;walker, standard   General Information   Pertinent History of Current Problem (include personal factors and/or comorbidities that impact the POC) Patient underwent Left total hip. At baseline also has lumbar stenosis and R hip OA as well. At baseline has numbness and tingling in both LEs. Gait in home and community was becoming limited by L hip pain. Patient elected to start with L KENNY and see how much symptoms improve.   General Observations loss of color and increased sweating in seated position   Cognition   Affect/Mental Status (Cognition) WNL   Orientation Status (Cognition) oriented x 4   Follows Commands (Cognition) WNL   Pain Assessment   Patient Currently in Pain No   Range of Motion (ROM)   ROM Comment Functional movement and muscle activation in LEs   Gait/Stairs (Locomotion)   [place] Level (Gait) minimum assist (75% patient effort)   Assistive Device (Gait) walker, 4-wheeled   Pattern (Gait) step-to   Clinical Impression   Criteria for Skilled Therapeutic Intervention Yes, treatment indicated   Clinical Presentation (PT Evaluation Complexity) evolving  (hypoptension BPs)   Clinical Presentation Rationale clinical judgement   Clinical Decision Making (Complexity) moderate complexity   Planned Therapy Interventions (PT) gait  training;balance training;bed mobility training;ROM (range of motion);stair training;transfer training   Risk & Benefits of therapy have been explained evaluation/treatment results reviewed;care plan/treatment goals reviewed   Clinical Impression Comments Patient is currently limited by symptomatic hypotensive BPs. Experiencing lightheadedness and diaphoresis and flushing with sitting unsupported, transfers and standing. Unable to gait train in alvares today or work on stairs. patient has high volume of stairs at home.   Physical Therapy Goals   PT Frequency 2x/day   PT Predicted Duration/Target Date for Goal Attainment 11/06/23   PT Goals Bed Mobility;Transfers;Gait;Stairs   PT: Bed Mobility Independent   PT: Transfers Independent   PT: Gait Modified independent   PT: Stairs Supervision/stand-by assist   Interventions   Interventions Quick Adds Therapeutic Activity;Therapeutic Procedure;Gait Training   Therapeutic Procedure/Exercise   Ther. Procedure: strength, endurance, ROM, flexibillity Minutes (40163) 8   Symptoms Noted During/After Treatment none   Treatment Detail/Skilled Intervention Reviewed HEP with discussion of supine heel slides, quad sets, SLR and hip abduction. In seated patient performed seated glut sets and ankle pumps. In standing patient performed marches, weight shifts and marches. Good muscle tone and activation noted.   Therapeutic Activity   Therapeutic Activities: dynamic activities to improve functional performance Minutes (69403) 20   Symptoms Noted During/After Treatment Fatigue;Dizziness   Treatment Detail/Skilled Intervention Initial morning session consisted of bed to chair transfer. Standing exercises tiraled including weight shift, standing marches, hip abduction. Increased pain with hypotensive BP response noted. Unable to gait train. Pain meds had been recently given. Circling back once pain medication in full effect. RN and Dr. Evangelista informed.   Gait Training   Gait Training Minutes  (23251) 30   Symptoms Noted During/After Treatment (Gait Training) none   Treatment Detail/Skilled Intervention Returned after pain well controlled. Patient able to perform gait in room and hallway with Mod I. Stairs performed as well with training of patient and family with one hand rail and one person assist Min A. Patient cues d on sequencing CGA on ascent and Min A for descent using one hand rail.   Distance in Feet 200   [place] Level (Gait Training)   (Mod I with FWW)   Level of [place] (Stairs) minimum assist (75% patient effort)   PT Discharge Planning   PT Plan Home with assist from family   PT Discharge Recommendation (DC Rec) home with assist;home with outpatient physical therapy   PT Rationale for DC Rec Patient is cleared from mobility perspective for discharge to home with assist from family.   PT Brief overview of current status Patient had improving activity tolerance in afternoon compared to morning. Morning unable to tolerate gait due to lightheadedness 90/40s. Able to complete 200 feet ambulation Mod I and stairs training Min A. BP in afternoon 110/60s.   PT Equipment Needed at Discharge   (none. has FWW)   Total Session Time   Timed Code Treatment Minutes 58   Total Session Time (sum of timed and untimed services) 58

## 2023-11-05 NOTE — PLAN OF CARE
"Post op day #2 Left total hip replacement.  Pain has been minimal, utilized PRN oxycodone x 1 during this shift.  Pt has some new blanchable redness to her Left Upper lateral thigh.  Pt was having some Low BP's yesterday during day shift.  BP's have been stable during this shift.  /65 (BP Location: Left arm, Patient Position: Semi-Billingsley's, Cuff Size: Adult Regular)   Pulse 84   Temp 99.6  F (37.6  C) (Tympanic)   Resp 16   Ht 1.651 m (5' 5\")   Wt 81.6 kg (180 lb)   LMP 08/16/2003 (Approximate)   SpO2 93%   BMI 29.95 kg/m     Pt has been able to ambulate to bathroom with SBA x 1, walker and gait belt, pt denies dizziness, tolerated well walking to bathroom.      Goal Outcome Evaluation:           Overall Patient Progress: improvingOverall Patient Progress: improving      Problem: Adult Inpatient Plan of Care  Goal: Plan of Care Review  Description: The Plan of Care Review/Shift note should be completed every shift.  The Outcome Evaluation is a brief statement about your assessment that the patient is improving, declining, or no change.  This information will be displayed automatically on your shift  note.  Outcome: Progressing  Flowsheets (Taken 11/5/2023 0528)  Overall Patient Progress: improving  Goal: Patient-Specific Goal (Individualized)  Description: You can add care plan individualizations to a care plan. Examples of Individualization might be:  \"Parent requests to be called daily at 9am for status\", \"I have a hard time hearing out of my right ear\", or \"Do not touch me to wake me up as it startles  me\".  Outcome: Progressing  Flowsheets (Taken 11/5/2023 0528)  Individualized Care Needs: pain management, low BP's  Anxieties, Fears or Concerns: none at this time  Goal: Absence of Hospital-Acquired Illness or Injury  Outcome: Progressing  Intervention: Identify and Manage Fall Risk  Recent Flowsheet Documentation  Taken 11/5/2023 0333 by Kaleigh Reed RN  Safety Promotion/Fall Prevention:   " activity supervised   clutter free environment maintained   nonskid shoes/slippers when out of bed   room organization consistent   safety round/check completed   treat reversible contributory factors   treat underlying cause  Taken 11/5/2023 0228 by Kaleigh Reed RN  Safety Promotion/Fall Prevention:   activity supervised   clutter free environment maintained   nonskid shoes/slippers when out of bed   room organization consistent   safety round/check completed   treat reversible contributory factors   treat underlying cause   assistive device/personal items within reach  Taken 11/4/2023 1945 by Kaleigh Reed RN  Safety Promotion/Fall Prevention:   activity supervised   clutter free environment maintained   nonskid shoes/slippers when out of bed   room organization consistent   safety round/check completed   treat reversible contributory factors   treat underlying cause  Intervention: Prevent and Manage VTE (Venous Thromboembolism) Risk  Recent Flowsheet Documentation  Taken 11/5/2023 0333 by Kaleigh Reed RN  VTE Prevention/Management: SCDs (sequential compression devices) on  Taken 11/4/2023 1945 by Kaleigh Reed RN  VTE Prevention/Management: SCDs (sequential compression devices) on  Goal: Optimal Comfort and Wellbeing  Outcome: Progressing  Intervention: Monitor Pain and Promote Comfort  Recent Flowsheet Documentation  Taken 11/5/2023 0333 by Kaleigh Reed RN  Pain Management Interventions: (scheduled  tylenol) medication (see MAR)  Taken 11/4/2023 2152 by Kaleigh Reed RN  Pain Management Interventions: medication (see MAR)  Taken 11/4/2023 1940 by Kaleigh Reed RN  Pain Management Interventions: (scheduled tylenol) medication (see MAR)  Goal: Readiness for Transition of Care  Outcome: Progressing     Problem: Pain Acute  Goal: Optimal Pain Control and Function  Outcome: Progressing  Intervention: Develop Pain Management Plan  Recent Flowsheet Documentation  Taken 11/5/2023  0333 by Kaleigh Reed RN  Pain Management Interventions: (scheduled  tylenol) medication (see MAR)  Taken 11/4/2023 2152 by Kaleigh Reed RN  Pain Management Interventions: medication (see MAR)  Taken 11/4/2023 1940 by Kaleigh Reed RN  Pain Management Interventions: (scheduled tylenol) medication (see MAR)  Intervention: Prevent or Manage Pain  Recent Flowsheet Documentation  Taken 11/5/2023 0333 by Kaleigh Reed RN  Medication Review/Management: medications reviewed  Taken 11/4/2023 1945 by Kaleigh Reed RN  Medication Review/Management: medications reviewed     Problem: Infection  Goal: Absence of Infection Signs and Symptoms  Outcome: Progressing

## 2023-11-05 NOTE — DISCHARGE SUMMARY
"[facility]  Hospitalist Discharge Summary      Date of Admission:  11/3/2023  Date of Discharge:  11/05/23  Discharging Provider: Jean Carlos Donahue MD  Discharge Service: Hospitalist Service    Discharge Diagnoses   Status post left total hip arthroplasty  Acute blood loss anemia  Orthostatic hypotension resolved  Hyperlipidemia  Osteoarthritis    Clinically Significant Risk Factors     # Overweight: Estimated body mass index is 29.95 kg/m  as calculated from the following:    Height as of this encounter: 1.651 m (5' 5\").    Weight as of this encounter: 81.6 kg (180 lb).       Follow-ups Needed After Discharge   Follow-up Appointments     Follow Up Care      Follow-up with your Surgeon Team in 2 weeks for wound check.            Unresulted Labs Ordered in the Past 30 Days of this Admission       No orders found from 10/4/2023 to 11/4/2023.        These results will be followed up by     Discharge Disposition   Discharged to home  Condition at discharge: Stable    Hospital Course     Patient is a pleasant 72-year-old lady who was admitted following elective left hip total arthroplasty.  Patient tolerated procedure very well.  Postop patient developed orthostatic hypotension and dizziness and was monitored overnight on IV fluids.  This morning, blood pressure seems to be stable and patient is tolerating therapy.  Cleared by physical therapy for discharge.  Chronic conditions were monitored and treated.    Consultations This Hospital Stay   HOSPITALIST IP CONSULT  PHYSICAL THERAPY ADULT IP CONSULT  OCCUPATIONAL THERAPY ADULT IP CONSULT  SOCIAL WORK IP CONSULT    Code Status   Full Code    Time Spent on this Encounter   I, Jean Carlos Donahue MD, personally saw the patient today and spent greater than 30 minutes discharging this patient.       Jaen Carlos Donahue MD  [facility]  [address]  [place] [address]  Phone: [phone]  Fax: " "[phone]  ______________________________________________________________________    Physical Exam   Vital Signs: Temp: 97.2  F (36.2  C) Temp src: Tympanic BP: 127/73 Pulse: 73   Resp: 16 SpO2: 94 % O2 Device: None (Room air)    Weight: 180 lbs 0 oz  General Appearance: No acute distress  Respiratory: Good air entry bilaterally without any added sounds  Cardiovascular: Regular rate and rhythm with first and second heart sounds  GI: Soft with positive bowel sounds  Skin: Intact  Other:           Primary Care Physician   ARANZA FORTUNE    Discharge Orders      Reason for your hospital stay    Status Post Left Total Hip Arthroplasty     When to call - Contact Surgeon Team    You may experience symptoms that require follow-up before your scheduled appointment. Refer to the \"[employer]\" for instructions on when to contact your Surgeon Team if you are concerned about pain control, blood clots, constipation, or if you are unable to urinate.     When to call - Reach out to Urgent Care    If you are not able to reach your Surgeon Team and you need immediate care, go to the Orthopedic Walk-in Clinic or Urgent Care at your Surgeon's office.  Do NOT go to the Emergency Room unless you have shortness of breath, chest pain, or other signs of a medical emergency.     When to call - Reasons to Call 911    Call 911 immediately if you experience sudden-onset chest pain, arm weakness/numbness, slurred speech, or shortness of breath     Discharge Instruction - Breathing exercises    Perform breathing exercises using your Incentive Spirometer 10 times per hour while awake for 2 weeks.     Symptoms - Fever Management    A low grade fever can be expected after surgery.  Use acetaminophen (TYLENOL) as needed for fever management.  Contact your Surgeon Team if you have a fever greater than 101.5 F, chills, and/or night sweats.     Symptoms - Constipation management    Constipation (hard, dry bowel movements) is expected " after surgery due to the combination of being less active, the anesthetic, and the opioid pain medication.  You can do the following to help reduce constipation:  ~  FLUIDS:  Drink clear liquids (water or Gatorade), or juice (apple/prune).  ~  DIET:  Eat a fiber rich diet.    ~  ACTIVITY:  Get up and move around several times a day.  Increase your activity as you are able.  MEDICATIONS:  Reduce the risk of constipation by starting medications before you are constipated.  You can take Miralax   (1 packet as directed) and/or a stool softener (Senokot 1-2 tablets 1-2 times a day).  If you already have constipation and these medications are not working, you can get magnesium citrate and use as directed.  If you continue to have constipation you can try an over the counter suppository or enema.  Call your Surgeon Team if it has been greater than 3 days since your last bowel movement.     Symptoms - Reduced Urine Output    Changes in the amount of fluids you drank before and after surgery may result in problems urinating.  It is important to stay well-hydrated after surgery and drink plenty of water. If it has been greater than 8 hours since you have urinated despite drinking plenty of water, call your Surgeon Team.     Activity - Exercises to prevent blood clots    Unless otherwise directed by your Surgeon team, perform the following exercises at least three times per day for the first four weeks after surgery to prevent blood clots in your legs: 1) Point and flex your feet (Ankle Pumps), 2) Move your ankle around in big circles, 3) Wiggle your toes, 4) Walk, even for short distances, several times a day, will help decrease the risk of blood clots.     Comfort and Pain Management - Pain after Surgery    Pain after surgery is normal and expected.  You will have some amount of pain for several weeks after surgery.  Your pain will improve with time.  There are several things you can do to help reduce your pain including:  "rest, ice, elevation, and using pain medications as needed. Contact your Surgeon Team if you have pain that persists or worsens after surgery despite rest, ice, elevation, and taking your medication(s) as prescribed. Contact your Surgeon Team if you have new numbness, tingling, or weakness in your operative extremity.     Comfort and Pain Management - Swelling after Surgery    Swelling and/or bruising of the surgical extremity is common and may persist for several months after surgery. In addition to frequent icing and elevation, gentle compressive support with an ACE wrap or tubigrip may help with swelling. Apply compression regularly, removing at least twice daily to perform skin checks. Contact your Surgeon Team if your swelling increases and is NOT associated with an increase in your activity level, or if your swelling increases and is associated with redness and pain.     Comfort and Pain Management - LOWER Extremity Elevation    Swelling is expected for several months after surgery. This type of swelling is usually associated with gravity and activity, and can be improved with elevation.   The best way to do this is to get your \"toes above your nose\" by laying down and placing several pillows lengthwise under your calf and heel. When elevating your leg keep your knee completely straight. Perform this elevation as often as possible especially for the first two weeks after surgery.     Comfort and Pain Management - Cold therapy    Ice can be used to control swelling and discomfort after surgery. Place a thin towel over your operative site and apply the ice pack overtop. Leave ice pack in place for 20 minutes, then remove for 20 minutes. Repeat this 20 minutes on/20 minutes off routine as often as tolerated.     Medication Instructions - Acetaminophen (TYLENOL) Instructions    You were discharged with acetaminophen (TYLENOL) for pain management after surgery. Acetaminophen most effectively manages pain symptoms " "when it is taken on a schedule without missing doses (every four, six, or eight hours). Your Provider will prescribe a safe daily dose between 3000 - 4000 mg.  Do NOT exceed this daily dose. Most patients use acetaminophen for pain control for the first four weeks after surgery.  You can wean from this medication as your pain decreases.     Medication Instructions - NSAID Instructions    You were discharged with an anti-inflammatory medication for pain management to use in combination with acetaminophen (TYLENOL) and the narcotic pain medication.  Take this medication exactly as directed.  You should only take one anti-inflammatory at a time.  Some common anti-inflammatories include: ibuprofen (ADVIL, MOTRIN), naproxen (ALEVE, NAPROSYN), celecoxib (CELEBREX), meloxicam (MOBIC), ketorolac (TORADOL).  Take this medication with food and water.     Medication Instructions - Opioids - Tapering Instructions    In the first three days following surgery, your symptoms may warrant use of the narcotic pain medication every four to six hours as prescribed. This is normal. As your pain symptoms improve, focus your efforts on decreasing (tapering) use of narcotic medications. The most successful tapering strategy is to first, decrease the number of tablets you take every 4-6 hours to the minimum prescribed. Then, increase the amount of time between doses.  For example:  First, taper to   or 1 tablet every 4-6 hours.  Then, taper to   or 1 tablet every 6-8 hours.  Then, taper to   or 1 tablet every 8-10 hours.  Then, taper to   or 1 tablet every 10-12 hours.  Then, taper to   or 1 tablet at bedtime.  The bedtime dose can help with comfort during sleep and is typically the last dose to be discontinued after surgery.     Follow Up Care    Follow-up with your Surgeon Team in 2 weeks for wound check.     Activity - Total Hip Arthroplasty    Refer to the [facility] \"Your Guide to Total Joint Replacement\" for recommendations on " activities and Exercises.     Return to Driving    Return to driving - Driving is NOT permitted until directed by your provider. Under no circumstance are you permitted to drive while using narcotic pain medications.     Dressing / Wound Care - Wound    You have a clean dressing on your surgical wound. Dressing change instructions as follows: dressing will be removed at your follow-up appointment. Contact your Surgeon Team if you have increased redness, warmth around the surgical wound, and/or drainage from the surgical wound.     Dressing / Wound Care - NO Tub Bathing    Tub bathing, swimming, or any other activities that will cause your incision to be submerged in water should be avoided until allowed by your Surgeon.     Medication instructions -  Anticoagulation - aspirin    Take the aspirin as prescribed for a total of four weeks after surgery.  This is given to help minimize your risk of blood clot.     Medication Instructions - Opioid Instructions (Greater than or equal to 65 years)    You were discharged with an opioid medication (hydromorphone, oxycodone, hydrocodone, or tramadol). This medication should only be taken for breakthrough pain that is not controlled with acetaminophen (TYLENOL). If you rate your pain less than 3 you do not need this medication.  Pain rating 0-3:  You do not need this medication  Pain rating 4-6:  Take 1/2 tablet every 4-6 hours as needed  Pain rating 7-10:  Take 1 tablet every 4-6 hours as needed  Do not exceed 4 tablets per day     No precautions    No precautions directed by your Provider.  You may perform range of motion activities as tolerated.     Weight bearing as tolerated    Weight bearing as tolerated on your operative extremity.     Dressing / Wound care - Shower with wound/dressing covered    You must COVER your dressing or incision with saran wrap (or any other non-permeable covering) to allow the incision to remain dry while showering.  You may shower 3 days after  surgery as long as the surgical wound stays dry. Continue to cover your dressing or incision for showering until your first office visit.  You are strictly prohibited from soaking   or submerging the surgical wound underwater.     Discharge Instruction - Regular Diet Adult    Return to your pre-surgery diet unless instructed otherwise       Significant Results and Procedures   Most Recent 3 CBC's:  Recent Labs   Lab Test 11/05/23  0511 11/04/23  0517 10/20/23  1331 08/16/23  0959   WBC 9.7 12.8*  --  7.1   HGB 11.0* 12.3 14.7 14.1   MCV 87 85  --  88    237  --  259     Most Recent 3 BMP's:  Recent Labs   Lab Test 11/05/23  0511 11/04/23  0641 11/04/23 0517 11/03/23  1147 10/20/23  1331     --  140  --  138   POTASSIUM 3.7  --  4.5  --  4.0   CHLORIDE 113*  --  108*  --  103   CO2 21*  --  23  --  25   BUN 20.2  --  19.0  --  13.9   CR 0.64  --  0.76  --  0.92   ANIONGAP 7  --  9  --  10   ANITA 8.0*  --  8.8  --  9.5   * 166* 191*   < > 101*    < > = values in this interval not displayed.       Discharge Medications   Current Discharge Medication List        START taking these medications    Details   acetaminophen (TYLENOL) 325 MG tablet Take 2 tablets (650 mg) by mouth every 4 hours as needed for other (mild pain)  Qty: 100 tablet, Refills: 0    Associated Diagnoses: Left hip pain      !! aspirin 81 MG EC tablet Take 1 tablet (81 mg) by mouth 2 times daily  Qty: 60 tablet, Refills: 0    Associated Diagnoses: Left hip pain      hydrOXYzine (ATARAX) 10 MG tablet Take 1 tablet (10 mg) by mouth every 6 hours as needed for itching or anxiety (with pain, moderate pain)  Qty: 30 tablet, Refills: 0    Associated Diagnoses: Left hip pain      ondansetron (ZOFRAN ODT) 4 MG ODT tab Take 1 tablet (4 mg) by mouth every 8 hours as needed for nausea Dissolve ON the tongue.  Qty: 10 tablet, Refills: 0    Associated Diagnoses: Left hip pain      oxyCODONE (ROXICODONE) 5 MG tablet Take 1 tablet (5 mg) by mouth  every 4 hours as needed for moderate to severe pain  Qty: 20 tablet, Refills: 0    Associated Diagnoses: Left hip pain      senna-docusate (SENOKOT-S/PERICOLACE) 8.6-50 MG tablet Take 1-2 tablets by mouth 2 times daily Take while on oral narcotics to prevent or treat constipation.  Qty: 30 tablet, Refills: 0    Comments: While taking narcotics  Associated Diagnoses: Left hip pain       !! - Potential duplicate medications found. Please discuss with provider.        CONTINUE these medications which have NOT CHANGED    Details   !! aspirin (ASA) 81 MG EC tablet Take 1 tablet (81 mg) by mouth daily    Associated Diagnoses: Mixed hyperlipidemia      atorvastatin (LIPITOR) 10 MG tablet Take 1 tablet (10 mg) by mouth daily  Qty: 90 tablet, Refills: 3    Associated Diagnoses: Mixed hyperlipidemia      Calcium Polycarbophil (FIBER) 625 MG tablet Take by mouth daily      cyanocobalamin (VITAMIN B-12) 1000 MCG tablet Take 1 tablet (1,000 mcg) by mouth daily    Associated Diagnoses: Vitamin B12 deficiency      magnesium oxide 400 MG CAPS Take 1 capsule by mouth daily       !! - Potential duplicate medications found. Please discuss with provider.        Allergies   No Known Allergies

## 2023-11-06 ENCOUNTER — PATIENT OUTREACH (OUTPATIENT)
Dept: FAMILY MEDICINE | Facility: OTHER | Age: 73
End: 2023-11-06
Payer: MEDICARE

## 2023-11-06 NOTE — TELEPHONE ENCOUNTER
Surgical. Patient discharged with surgical follow-up only. No TCM call required per policy.     Ingrid Blake RN on 11/6/2023 at 11:13 AM

## 2023-11-10 ENCOUNTER — THERAPY VISIT (OUTPATIENT)
Dept: PHYSICAL THERAPY | Facility: OTHER | Age: 73
End: 2023-11-10
Attending: ORTHOPAEDIC SURGERY
Payer: MEDICARE

## 2023-11-10 DIAGNOSIS — M16.12 PRIMARY OSTEOARTHRITIS OF LEFT HIP: ICD-10-CM

## 2023-11-10 PROCEDURE — 97110 THERAPEUTIC EXERCISES: CPT | Mod: GP | Performed by: PHYSICAL THERAPIST

## 2023-11-10 PROCEDURE — 97161 PT EVAL LOW COMPLEX 20 MIN: CPT | Mod: GP | Performed by: PHYSICAL THERAPIST

## 2023-11-10 NOTE — PROGRESS NOTES
PHYSICAL THERAPY EVALUATION  Type of Visit: Evaluation    See electronic medical record for Abuse and Falls Screening details.    Subjective       Presenting condition or subjective complaint: Hip surgery  Date of onset: 23    Relevant medical history:     Dates & types of surgery: 11-3-23    Prior diagnostic imaging/testing results: X-ray     Prior therapy history for the same diagnosis, illness or injury: No      Prior Level of Function  Transfers: Independent  Ambulation: Independent  ADL: Independent  IADL:     Living Environment  Social support: Alone   Type of home: Multi-level   Stairs to enter the home: Yes 4 Is there a railing: No   Ramp: No   Stairs inside the home: Yes 6 Is there a railing: Yes   Help at home: None  Equipment owned: Walker with wheels; Raised toilet seat     Employment: No    Hobbies/Interests:      Patient goals for therapy:      Pain assessment: Location: left hip/Ratin-3/10     Objective   HIP EVALUATION  PAIN: Pain is Exacerbated By: Walking and bending  INTEGUMENTARY (edema, incisions): Incision not assessed today  POSTURE: WNL  GAIT:   Weightbearing Status: WBAT  Assistive Device(s): Walker (front wheeled)  Gait Deviations: Antalgic  Stance time decreased  Felisha decreased  BALANCE/PROPRIOCEPTION: Not tested at this time  WEIGHTBEARING ALIGNMENT: WFL  NON-WEIGHTBEARING ALIGNMENT: WFL   ROM: With walking able to achieve approximately 5 degrees of extension and flexion to near 90 with supine and seated    PELVIC/SI SCREEN: WNL  STRENGTH:  Has good strength of ankle and knee hip reduced due to surgery but sufficient at this time  LE FLEXIBILITY: WFL  SPECIAL TESTS: Not applicable  FUNCTIONAL TESTS: Not applicable  PALPATION:  Edema throughout upper and lower leg, no signs of DVT    Assessment & Plan   CLINICAL IMPRESSIONS  Medical Diagnosis: Total hip left    Treatment Diagnosis: Decreased range of motion strength and overall mobility status post total hip    Impression/Assessment: Patient is a 72 year old female with hip and mobility complaints.  The following significant findings have been identified: Pain, Decreased ROM/flexibility, Decreased joint mobility, Decreased strength, Impaired balance, Decreased proprioception, Edema, Impaired gait, Impaired muscle performance, and Decreased activity tolerance. These impairments interfere with their ability to perform self care tasks, recreational activities, household chores, driving , household mobility, and community mobility as compared to previous level of function.     Clinical Decision Making (Complexity):  Clinical Presentation: Stable/Uncomplicated  Clinical Presentation Rationale: based on medical and personal factors listed in PT evaluation  Clinical Decision Making (Complexity): Low complexity    PLAN OF CARE  Treatment Interventions:  Modalities: Cryotherapy, E-stim, Hot Pack  Interventions: Gait Training, Manual Therapy, Neuromuscular Re-education, Therapeutic Activity, Therapeutic Exercise, Self-Care/Home Management, Aquatic Therapy    Long Term Goals     PT Goal 1  Goal Identifier: Home exercise program  Goal Description: Patient will maintain home exercise program at least 3 times a week to improve range of motion strength and overall mobility as well as decreasing pain  Target Date: 02/08/24  PT Goal 2  Goal Identifier: Gait  Goal Description: Patient will walk with no device and normal reciprocal gait pattern for at least 1000 feet to return to hobbies  Target Date: 02/08/24  PT Goal 3  Goal Identifier: Stairs  Goal Description: Patient will have reciprocal gait pattern with use of railing to have improved mobility throughout her home  Target Date: 02/08/24      Frequency of Treatment: 1-2 times a week  Duration of Treatment: 4 to 8 weeks    Recommended Referrals to Other Professionals:   Education Assessment:        Risks and benefits of evaluation/treatment have been explained.    Patient/Family/caregiver agrees with Plan of Care.     Evaluation Time:     PT Eval, Low Complexity Minutes (25259): 20       Signing Clinician: [facility]                                                                                   OUTPATIENT PHYSICAL THERAPY      PLAN OF TREATMENT FOR OUTPATIENT REHABILITATION   Patient's Last Name, First Name, Libby Dodge YOB: 1950   Provider's Name   [facility]   Medical Record No.  [id number]     Onset Date: 11/03/23  Start of Care Date: 11/10/23     Medical Diagnosis:  Total hip left      PT Treatment Diagnosis:  Decreased range of motion strength and overall mobility status post total hip Plan of Treatment  Frequency/Duration: 1-2 times a week/ 4 to 8 weeks    Certification date from 11/10/23 to 02/08/24         See note for plan of treatment details and functional goals     Jerrod Sellers PT                         I CERTIFY THE NEED FOR THESE SERVICES FURNISHED UNDER        THIS PLAN OF TREATMENT AND WHILE UNDER MY CARE     (Physician attestation of this document indicates review and certification of the therapy plan).              Referring Provider:  Billy Hamm    Initial Assessment  See Epic Evaluation- Start of Care Date: 11/10/23

## 2023-11-13 ENCOUNTER — PATIENT OUTREACH (OUTPATIENT)
Dept: GASTROENTEROLOGY | Facility: CLINIC | Age: 73
End: 2023-11-13
Payer: MEDICARE

## 2023-11-14 ENCOUNTER — THERAPY VISIT (OUTPATIENT)
Dept: PHYSICAL THERAPY | Facility: OTHER | Age: 73
End: 2023-11-14
Attending: ORTHOPAEDIC SURGERY
Payer: MEDICARE

## 2023-11-14 DIAGNOSIS — M16.12 PRIMARY OSTEOARTHRITIS OF LEFT HIP: Primary | ICD-10-CM

## 2023-11-14 PROCEDURE — 97110 THERAPEUTIC EXERCISES: CPT | Mod: GP,CQ

## 2023-11-15 ENCOUNTER — OFFICE VISIT (OUTPATIENT)
Dept: ORTHOPEDICS | Facility: OTHER | Age: 73
End: 2023-11-15
Attending: ORTHOPAEDIC SURGERY
Payer: MEDICARE

## 2023-11-15 DIAGNOSIS — Z96.642 STATUS POST LEFT HIP REPLACEMENT: Primary | ICD-10-CM

## 2023-11-15 PROCEDURE — G0463 HOSPITAL OUTPT CLINIC VISIT: HCPCS

## 2023-11-15 PROCEDURE — 99024 POSTOP FOLLOW-UP VISIT: CPT | Performed by: ORTHOPAEDIC SURGERY

## 2023-11-15 NOTE — PROGRESS NOTES
SUBJECTIVE:  Patient returns status post left total hip replacement.  Patient overall is doing quite well.  Patient is basically off pain medications at this time.    ROS: Musculoskeletal and general review of systems are negative, per review of previous clinic questionnaire.  Denies SOB and calf pain.    EXAM: Left hip shows well-healed incision.  Neuro examination intact.  Walking with no significant limp.    IMAGING: None    ASSESSMENT: Left total hip replacement    PLAN: Continue physical therapy program.  Recheck examination in 1 month 2 views left hip.    Billy Hamm MD

## 2023-11-21 ENCOUNTER — THERAPY VISIT (OUTPATIENT)
Dept: PHYSICAL THERAPY | Facility: OTHER | Age: 73
End: 2023-11-21
Attending: ORTHOPAEDIC SURGERY
Payer: MEDICARE

## 2023-11-21 DIAGNOSIS — M16.12 PRIMARY OSTEOARTHRITIS OF LEFT HIP: Primary | ICD-10-CM

## 2023-11-21 DIAGNOSIS — R29.898 DECREASED STRENGTH OF LOWER EXTREMITY: ICD-10-CM

## 2023-11-21 PROCEDURE — 97110 THERAPEUTIC EXERCISES: CPT | Mod: GP

## 2023-11-30 ENCOUNTER — THERAPY VISIT (OUTPATIENT)
Dept: PHYSICAL THERAPY | Facility: OTHER | Age: 73
End: 2023-11-30
Attending: ORTHOPAEDIC SURGERY
Payer: MEDICARE

## 2023-11-30 DIAGNOSIS — M16.12 PRIMARY OSTEOARTHRITIS OF LEFT HIP: Primary | ICD-10-CM

## 2023-11-30 DIAGNOSIS — R29.898 DECREASED STRENGTH OF LOWER EXTREMITY: ICD-10-CM

## 2023-11-30 PROCEDURE — 97110 THERAPEUTIC EXERCISES: CPT | Mod: GP

## 2023-12-04 ENCOUNTER — THERAPY VISIT (OUTPATIENT)
Dept: PHYSICAL THERAPY | Facility: OTHER | Age: 73
End: 2023-12-04
Attending: ORTHOPAEDIC SURGERY
Payer: MEDICARE

## 2023-12-04 DIAGNOSIS — M16.12 PRIMARY OSTEOARTHRITIS OF LEFT HIP: Primary | ICD-10-CM

## 2023-12-04 DIAGNOSIS — R29.898 DECREASED STRENGTH OF LOWER EXTREMITY: ICD-10-CM

## 2023-12-04 PROCEDURE — 97110 THERAPEUTIC EXERCISES: CPT | Mod: GP

## 2023-12-07 ENCOUNTER — THERAPY VISIT (OUTPATIENT)
Dept: PHYSICAL THERAPY | Facility: OTHER | Age: 73
End: 2023-12-07
Attending: ORTHOPAEDIC SURGERY
Payer: MEDICARE

## 2023-12-07 DIAGNOSIS — R29.898 DECREASED STRENGTH OF LOWER EXTREMITY: ICD-10-CM

## 2023-12-07 DIAGNOSIS — M16.12 PRIMARY OSTEOARTHRITIS OF LEFT HIP: Primary | ICD-10-CM

## 2023-12-07 PROCEDURE — 97110 THERAPEUTIC EXERCISES: CPT | Mod: GP

## 2023-12-19 DIAGNOSIS — Z96.642 STATUS POST LEFT HIP REPLACEMENT: Primary | ICD-10-CM

## 2023-12-20 ENCOUNTER — HOSPITAL ENCOUNTER (OUTPATIENT)
Dept: GENERAL RADIOLOGY | Facility: OTHER | Age: 73
Discharge: HOME | End: 2023-12-20
Attending: ORTHOPAEDIC SURGERY
Payer: MEDICARE

## 2023-12-20 ENCOUNTER — OFFICE VISIT (OUTPATIENT)
Dept: ORTHOPEDICS | Facility: OTHER | Age: 73
End: 2023-12-20
Attending: ORTHOPAEDIC SURGERY
Payer: MEDICARE

## 2023-12-20 DIAGNOSIS — Z96.642 STATUS POST LEFT HIP REPLACEMENT: Primary | ICD-10-CM

## 2023-12-20 DIAGNOSIS — Z96.642 STATUS POST LEFT HIP REPLACEMENT: ICD-10-CM

## 2023-12-20 PROCEDURE — 99024 POSTOP FOLLOW-UP VISIT: CPT | Performed by: ORTHOPAEDIC SURGERY

## 2023-12-20 PROCEDURE — G0463 HOSPITAL OUTPT CLINIC VISIT: HCPCS

## 2023-12-20 PROCEDURE — 73502 X-RAY EXAM HIP UNI 2-3 VIEWS: CPT

## 2023-12-20 RX ORDER — AMOXICILLIN 500 MG/1
500 CAPSULE ORAL ONCE
Qty: 4 CAPSULE | Refills: 4 | Status: SHIPPED | OUTPATIENT
Start: 2023-12-20 | End: 2023-12-20

## 2023-12-20 NOTE — PROGRESS NOTES
SUBJECTIVE:  Patient returns status post left total hip replacement.  Patient overall reports she is doing very well.  Patient is 6 weeks status post replacement.    ROS: Musculoskeletal and general review of systems are negative, per review of previous clinic questionnaire.  Denies SOB and calf pain.    EXAM: Left hip examination shows excellent range of motion.  Minimal swelling noted.  Neuro exam intact.    IMAGIN views left hip show patient components to be in stable alignment and position.    ASSESSMENT: Left total hip replacement    PLAN: Increase activities as comfortable.  Follow-up exam at 1 year 2 views left hip.    Billy Hamm MD

## 2024-01-11 PROBLEM — M16.12 OSTEOARTHRITIS OF LEFT HIP: Status: RESOLVED | Noted: 2019-06-17 | Resolved: 2024-01-11

## 2024-01-11 NOTE — PROGRESS NOTES
"   12/07/23 0500   Appointment Info   Signing clinician's name / credentials Kaleigh Hernández DPT   Total/Authorized Visits 6   Visits Used 6/10   Medical Diagnosis Total hip left   PT Tx Diagnosis Decreased range of motion strength and overall mobility status post total hip   Quick Adds Certification   Progress Note/Certification   Start of Care Date 11/10/23   Onset of illness/injury or Date of Surgery 11/03/23   Therapy Frequency 1-2 times a week   Predicted Duration 4 to 8 weeks   Certification date from 11/10/23   Certification date to 02/08/24   GOALS   PT Goals 2;3   PT Goal 1   Goal Identifier Home exercise program   Goal Description Patient will maintain home exercise program at least 3 times a week to improve range of motion strength and overall mobility as well as decreasing pain   Target Date 02/08/24   PT Goal 2   Goal Identifier Gait   Goal Description Patient will walk with no device and normal reciprocal gait pattern for at least 1000 feet to return to hobbies   Target Date 02/08/24   PT Goal 3   Goal Identifier Stairs   Goal Description Patient will have reciprocal gait pattern with use of railing to have improved mobility throughout her home   Target Date 02/08/24   Subjective Report   Subjective Report Patient presents to physical therapy status post left total hip, notes some tightness at the proximal lateral thigh. She would like to continue with exercise on her own at home, she was instructed to follow up with physical therapy if she needed.   Treatment Interventions (PT)   Interventions Therapeutic Procedure/Exercise;Gait Training   Therapeutic Procedure/Exercise   Therapeutic Procedures: strength, endurance, ROM, flexibillity minutes (52488) 40   Ther Proc 1 - Details Ambulates without A device. Nustep Pro: seat #9 lvl3 x 9 mins. Standing: hip flexion (Alt), abduction each, extension L 2 x 10 reps, 4\" lateral step up L 2x10. Supine: bridging 2x10 reps, SAQ L 2x10.  Attemped SLR-too " difficult and pain limits, performed hooklying alt hip flex 2x10.  Sidelying clamshell L 2x10, Hip Abd L 2x8  PT min A/CGA.   Skilled Intervention Verbal cues and tactile for positioning, instruction in exercise and repetitions, tactile cues for clamshell.   Patient Response/Progress Favorable, improving gait and function with Left KENNY, improving hip flex and stride length. Unable to perform SLR in supine. Patient is wanting to continue independently   Plan   Home program Ankle pumps, glutes sets, heel slides, supine hip abduction, Hook lying bridge, hook lying ball squeeze abduction, seated long arc quads, sidestepping along countertop,   Updates to plan of care Access Code: [email or web address]  URL: [email or web address]  Date: 12/04/2023  Prepared by: Kaleigh Hernández    Exercises  - Standing Hip Abduction with Counter Support  - 1 x daily - 7 x weekly - 2 sets - 10 reps  - Standing March with Counter Support  - 1 x daily - 7 x weekly - 2 sets - 10 reps  - Standing Hip Extension with Counter Support  - 1 x daily - 7 x weekly - 2 sets - 10 reps  - Mini Squat with Counter Support  - 1 x daily - 7 x weekly - 2 sets - 10 reps  - Supine Bridge  - 1 x daily - 7 x weekly - 2 sets - 10 reps  - Clamshell  - 1 x daily - 7 x weekly - 2 sets - 10 reps. Access Code: [email or web address]  URL: [email or web address]  Date: 12/07/2023  Prepared by: Kaleigh Betty    Exercises  - Supine March  - 1 x daily - 7 x weekly - 2 sets - 10 reps  - Supine Straight Leg Raises  - 1 x daily - 7 x weekly - 10 reps  - Sidelying Hip Abduction  - 1 x daily - 7 x weekly - 2 sets - 10 reps   Plan for next session Patient chooses to continue independently with HEP, advised to return for further strengthening and instruction if needed. If no return in next month with discharge.   Total Session Time   Timed Code Treatment Minutes 40   Total Treatment Time (sum of timed and untimed services) 40         DISCHARGE  Reason for Discharge:  Patient chooses to discontinue therapy.      Discharge Plan: Patient to continue home program.    Referring Provider:  Billy Hamm

## 2024-01-25 ENCOUNTER — HOSPITAL ENCOUNTER (OUTPATIENT)
Dept: MAMMOGRAPHY | Facility: OTHER | Age: 74
Discharge: HOME | End: 2024-01-25
Attending: FAMILY MEDICINE | Admitting: FAMILY MEDICINE
Payer: MEDICARE

## 2024-01-25 DIAGNOSIS — Z12.31 VISIT FOR SCREENING MAMMOGRAM: ICD-10-CM

## 2024-01-25 PROCEDURE — 77063 BREAST TOMOSYNTHESIS BI: CPT

## 2024-05-21 ENCOUNTER — HOSPITAL ENCOUNTER (OUTPATIENT)
Dept: MRI IMAGING | Facility: OTHER | Age: 74
Discharge: HOME | End: 2024-05-21
Attending: STUDENT IN AN ORGANIZED HEALTH CARE EDUCATION/TRAINING PROGRAM | Admitting: STUDENT IN AN ORGANIZED HEALTH CARE EDUCATION/TRAINING PROGRAM
Payer: MEDICARE

## 2024-05-21 DIAGNOSIS — M54.50 LOW BACK PAIN: ICD-10-CM

## 2024-05-21 PROCEDURE — 72148 MRI LUMBAR SPINE W/O DYE: CPT

## 2024-07-11 ENCOUNTER — TRANSFERRED RECORDS (OUTPATIENT)
Dept: HEALTH INFORMATION MANAGEMENT | Facility: OTHER | Age: 74
End: 2024-07-11
Payer: MEDICARE

## 2024-08-07 ENCOUNTER — OFFICE VISIT (OUTPATIENT)
Dept: FAMILY MEDICINE | Facility: OTHER | Age: 74
End: 2024-08-07
Attending: NURSE PRACTITIONER
Payer: MEDICARE

## 2024-08-07 VITALS
RESPIRATION RATE: 14 BRPM | BODY MASS INDEX: 30.86 KG/M2 | HEART RATE: 68 BPM | TEMPERATURE: 97.1 F | HEIGHT: 65 IN | DIASTOLIC BLOOD PRESSURE: 86 MMHG | SYSTOLIC BLOOD PRESSURE: 136 MMHG | OXYGEN SATURATION: 98 % | WEIGHT: 185.2 LBS

## 2024-08-07 DIAGNOSIS — M54.17 LUMBOSACRAL RADICULOPATHY AT L5: ICD-10-CM

## 2024-08-07 DIAGNOSIS — E66.811 CLASS 1 OBESITY DUE TO EXCESS CALORIES WITHOUT SERIOUS COMORBIDITY WITH BODY MASS INDEX (BMI) OF 30.0 TO 30.9 IN ADULT: ICD-10-CM

## 2024-08-07 DIAGNOSIS — E78.2 MIXED HYPERLIPIDEMIA: ICD-10-CM

## 2024-08-07 DIAGNOSIS — R20.2 BILATERAL LEG PARESTHESIA: ICD-10-CM

## 2024-08-07 DIAGNOSIS — Z01.818 PREOP GENERAL PHYSICAL EXAM: Primary | ICD-10-CM

## 2024-08-07 DIAGNOSIS — E66.09 CLASS 1 OBESITY DUE TO EXCESS CALORIES WITHOUT SERIOUS COMORBIDITY WITH BODY MASS INDEX (BMI) OF 30.0 TO 30.9 IN ADULT: ICD-10-CM

## 2024-08-07 DIAGNOSIS — E53.8 VITAMIN B12 DEFICIENCY: ICD-10-CM

## 2024-08-07 DIAGNOSIS — M48.062 SPINAL STENOSIS OF LUMBAR REGION WITH NEUROGENIC CLAUDICATION: ICD-10-CM

## 2024-08-07 LAB
ANION GAP SERPL CALCULATED.3IONS-SCNC: 8 MMOL/L (ref 7–15)
ATRIAL RATE - MUSE: 67 BPM
BASOPHILS # BLD AUTO: 0.1 10E3/UL (ref 0–0.2)
BASOPHILS NFR BLD AUTO: 1 %
BUN SERPL-MCNC: 15 MG/DL (ref 8–23)
CALCIUM SERPL-MCNC: 9.5 MG/DL (ref 8.8–10.4)
CHLORIDE SERPL-SCNC: 109 MMOL/L (ref 98–107)
CHOLEST SERPL-MCNC: 142 MG/DL
CREAT SERPL-MCNC: 0.85 MG/DL (ref 0.51–0.95)
DIASTOLIC BLOOD PRESSURE - MUSE: NORMAL MMHG
EGFRCR SERPLBLD CKD-EPI 2021: 73 ML/MIN/1.73M2
EOSINOPHIL # BLD AUTO: 0.1 10E3/UL (ref 0–0.7)
EOSINOPHIL NFR BLD AUTO: 2 %
ERYTHROCYTE [DISTWIDTH] IN BLOOD BY AUTOMATED COUNT: 13.9 % (ref 10–15)
FASTING STATUS PATIENT QL REPORTED: YES
FASTING STATUS PATIENT QL REPORTED: YES
GLUCOSE SERPL-MCNC: 109 MG/DL (ref 70–99)
HCO3 SERPL-SCNC: 26 MMOL/L (ref 22–29)
HCT VFR BLD AUTO: 45.3 % (ref 35–47)
HDLC SERPL-MCNC: 48 MG/DL
HGB BLD-MCNC: 14.5 G/DL (ref 11.7–15.7)
IMM GRANULOCYTES # BLD: 0 10E3/UL
IMM GRANULOCYTES NFR BLD: 0 %
INTERPRETATION ECG - MUSE: NORMAL
LDLC SERPL CALC-MCNC: 75 MG/DL
LYMPHOCYTES # BLD AUTO: 2.4 10E3/UL (ref 0.8–5.3)
LYMPHOCYTES NFR BLD AUTO: 36 %
MCH RBC QN AUTO: 28.8 PG (ref 26.5–33)
MCHC RBC AUTO-ENTMCNC: 32 G/DL (ref 31.5–36.5)
MCV RBC AUTO: 90 FL (ref 78–100)
MONOCYTES # BLD AUTO: 0.5 10E3/UL (ref 0–1.3)
MONOCYTES NFR BLD AUTO: 7 %
NEUTROPHILS # BLD AUTO: 3.6 10E3/UL (ref 1.6–8.3)
NEUTROPHILS NFR BLD AUTO: 54 %
NONHDLC SERPL-MCNC: 94 MG/DL
NRBC # BLD AUTO: 0 10E3/UL
NRBC BLD AUTO-RTO: 0 /100
P AXIS - MUSE: 54 DEGREES
PLATELET # BLD AUTO: 249 10E3/UL (ref 150–450)
POTASSIUM SERPL-SCNC: 4.3 MMOL/L (ref 3.4–5.3)
PR INTERVAL - MUSE: 148 MS
QRS DURATION - MUSE: 84 MS
QT - MUSE: 396 MS
QTC - MUSE: 418 MS
R AXIS - MUSE: -63 DEGREES
RBC # BLD AUTO: 5.04 10E6/UL (ref 3.8–5.2)
SODIUM SERPL-SCNC: 143 MMOL/L (ref 135–145)
SYSTOLIC BLOOD PRESSURE - MUSE: NORMAL MMHG
T AXIS - MUSE: 14 DEGREES
TRIGL SERPL-MCNC: 96 MG/DL
VENTRICULAR RATE- MUSE: 67 BPM
WBC # BLD AUTO: 6.7 10E3/UL (ref 4–11)

## 2024-08-07 PROCEDURE — 80061 LIPID PANEL: CPT | Mod: ZL | Performed by: NURSE PRACTITIONER

## 2024-08-07 PROCEDURE — 93005 ELECTROCARDIOGRAM TRACING: CPT | Performed by: NURSE PRACTITIONER

## 2024-08-07 PROCEDURE — 85041 AUTOMATED RBC COUNT: CPT | Mod: ZL | Performed by: NURSE PRACTITIONER

## 2024-08-07 PROCEDURE — 80048 BASIC METABOLIC PNL TOTAL CA: CPT | Mod: ZL | Performed by: NURSE PRACTITIONER

## 2024-08-07 PROCEDURE — 36415 COLL VENOUS BLD VENIPUNCTURE: CPT | Mod: ZL | Performed by: NURSE PRACTITIONER

## 2024-08-07 PROCEDURE — 99214 OFFICE O/P EST MOD 30 MIN: CPT | Performed by: NURSE PRACTITIONER

## 2024-08-07 PROCEDURE — G0463 HOSPITAL OUTPT CLINIC VISIT: HCPCS

## 2024-08-07 PROCEDURE — 93010 ELECTROCARDIOGRAM REPORT: CPT | Performed by: INTERNAL MEDICINE

## 2024-08-07 RX ORDER — ATORVASTATIN CALCIUM 10 MG/1
10 TABLET, FILM COATED ORAL DAILY
Qty: 90 TABLET | Refills: 4 | Status: SHIPPED | OUTPATIENT
Start: 2024-08-07

## 2024-08-07 ASSESSMENT — PAIN SCALES - GENERAL: PAINLEVEL: MODERATE PAIN (5)

## 2024-08-07 NOTE — NURSING NOTE
"Chief Complaint   Patient presents with    Pre-Op Exam       Initial /86   Pulse 68   Temp 97.1  F (36.2  C) (Tympanic)   Resp 14   Ht 1.651 m (5' 5\")   Wt 84 kg (185 lb 3.2 oz)   LMP 08/16/2003 (Approximate)   SpO2 98%   Breastfeeding No   BMI 30.82 kg/m   Estimated body mass index is 30.82 kg/m  as calculated from the following:    Height as of this encounter: 1.651 m (5' 5\").    Weight as of this encounter: 84 kg (185 lb 3.2 oz).  Medication Review: complete    The next two questions are to help us understand your food security.  If you are feeling you need any assistance in this area, we have resources available to support you today.          10/13/2023   SDOH- Food Insecurity   Within the past 12 months, did you worry that your food would run out before you got money to buy more? N   Within the past 12 months, did the food you bought just not last and you didn t have money to get more? N            Health Care Directive:  Patient has a Health Care Directive on file      Monet Denny CMA        "

## 2024-08-07 NOTE — PATIENT INSTRUCTIONS
How to Take Your Medication Before Surgery  Preoperative Medication Instructions   Antiplatelet or Anticoagulation Medication Instructions   - aspirin: Discontinue aspirin 7-10 days prior to procedure to reduce bleeding risk. It should be resumed postoperatively.     Additional Medication Instructions   - Statins: Continue taking on the day of surgery.    - Herbal medications and vitamins: DO NOT TAKE 14 days prior to surgery.     Don't take any NSAIDS, ibuprofen, aleve, naproxen for a week prior to surgery. If you needed anything for pain, you may take tylenol/acetaminophen up until surgery.     Patient Education   Preparing for Your Surgery  Getting started  A nurse will call you to review your health history and instructions. They will give you an arrival time based on your scheduled surgery time. Please be ready to share:  Your doctor's clinic name and phone number  Your medical, surgical, and anesthesia history  A list of allergies and sensitivities  A list of medicines, including herbal treatments and over-the-counter drugs  Whether the patient has a legal guardian (ask how to send us the papers in advance)  Please tell us if you're pregnant--or if there's any chance you might be pregnant. Some surgeries may injure a fetus (unborn baby), so they require a pregnancy test. Surgeries that are safe for a fetus don't always need a test, and you can choose whether to have one.   If you have a child who's having surgery, please ask for a copy of Preparing for Your Child's Surgery.    Preparing for surgery  Within 10 to 30 days of surgery: Have a pre-op exam (sometimes called an H&P, or History and Physical). This can be done at a clinic or pre-operative center.  If you're having a , you may not need this exam. Talk to your care team.  At your pre-op exam, talk to your care team about all medicines you take. If you need to stop any medicines before surgery, ask when to start taking them again.  We do this for  your safety. Many medicines can make you bleed too much during surgery. Some change how well surgery (anesthesia) drugs work.  Call your insurance company to let them know you're having surgery. (If you don't have insurance, call [phone].)  Call your clinic if there's any change in your health. This includes signs of a cold or flu (sore throat, runny nose, cough, rash, fever). It also includes a scrape or scratch near the surgery site.  If you have questions on the day of surgery, call your hospital or surgery center.  Eating and drinking guidelines  For your safety: Unless your surgeon tells you otherwise, follow the guidelines below.  Eat and drink as usual until 8 hours before you arrive for surgery. After that, no food or milk.  Drink clear liquids until 2 hours before you arrive. These are liquids you can see through, like water, Gatorade, and Propel Water. They also include plain black coffee and tea (no cream or milk), candy, and breath mints. You can spit out gum when you arrive.  If you drink alcohol: Stop drinking it the night before surgery.  If your care team tells you to take medicine on the morning of surgery, it's okay to take it with a sip of water.  Preventing infection  Shower or bathe the night before and morning of your surgery. Follow the instructions your clinic gave you. (If no instructions, use regular soap.)  Don't shave or clip hair near your surgery site. We'll remove the hair if needed.  Don't smoke or vape the morning of surgery. You may chew nicotine gum up to 2 hours before surgery. A nicotine patch is okay.  Note: Some surgeries require you to completely quit smoking and nicotine. Check with your surgeon.  Your care team will make every effort to keep you safe from infection. We will:  Clean our hands often with soap and water (or an alcohol-based hand rub).  Clean the skin at your surgery site with a special soap that kills germs.  Give you a special gown to keep you warm. (Cold  raises the risk of infection.)  Wear special hair covers, masks, gowns and gloves during surgery.  Give antibiotic medicine, if prescribed. Not all surgeries need antibiotics.  What to bring on the day of surgery  Photo ID and insurance card  Copy of your health care directive, if you have one  Glasses and hearing aids (bring cases)  You can't wear contacts during surgery  Inhaler and eye drops, if you use them (tell us about these when you arrive)  CPAP machine or breathing device, if you use them  A few personal items, if spending the night  If you have . . .  A pacemaker, ICD (cardiac defibrillator) or other implant: Bring the ID card.  An implanted stimulator: Bring the remote control.  A legal guardian: Bring a copy of the certified (court-stamped) guardianship papers.  Please remove any jewelry, including body piercings. Leave jewelry and other valuables at home.  If you're going home the day of surgery  You must have a responsible adult drive you home. They should stay with you overnight as well.  If you don't have someone to stay with you, and you aren't safe to go home alone, we may keep you overnight. Insurance often won't pay for this.  After surgery  If it's hard to control your pain or you need more pain medicine, please call your surgeon's office.  Questions?   If you have any questions for your care team, list them here: _________________________________________________________________________________________________________________________________________________________________________ ____________________________________ ____________________________________ ____________________________________  For informational purposes only. Not to replace the advice of your health care provider. Copyright   2003, 2019 [facility]. All rights reserved. Clinically reviewed by Padmini Jackman MD. [employer] 469161 - REV 12/22.

## 2024-08-07 NOTE — PROGRESS NOTES
Preoperative Evaluation  [facility]  [address]  [place] [address]  Phone: [phone]  Fax: [phone]  Primary Provider: ARANZA FORTUNE MD  Pre-op Performing Provider: Monet Conway NP  Aug 7, 2024             8/2/2024   Surgical Information   What procedure is being done? Laminectomy   Facility or Hospital where procedure/surgery will be performed: [facility], [place]   Who is doing the procedure / surgery? Tom Barker   Date of surgery / procedure: August 28, 2024   Time of surgery / procedure: Dont know yet   Where do you plan to recover after surgery? at home with family        Fax number for surgical facility: [phone]    Assessment & Plan     The proposed surgical procedure is considered INTERMEDIATE risk.    Problem List Items Addressed This Visit       Obesity    Vitamin B12 deficiency     Other Visit Diagnoses       Preop general physical exam    -  Primary    Lumbosacral radiculopathy at L5        Spinal stenosis of lumbar region with neurogenic claudication        Bilateral leg paresthesia        Mixed hyperlipidemia        Relevant Medications    atorvastatin (LIPITOR) 10 MG tablet    Other Relevant Orders    Lipid Panel (Completed)             1. Preop general physical exam  2. Lumbosacral radiculopathy at L5  3. Spinal stenosis of lumbar region with neurogenic claudication  4. Bilateral leg paresthesia  Indication for upcoming laminectomy scheduled with Dr. Barker on 8/28/2024. No contraindications for upcoming procedure found on today's preoperative evaluation and examination. MRI from 5/21/2024 showed high grade spinal canal narrowing at L2-L5 associated with nerve root clumping. Enlarged L4-5 lateral left foraminal synovial cyst with impingement of the lateral left L4 nerve.     5. Mixed hyperlipidemia  - Lipid Panel  Stable; drawn today as we were already drawing blood and lipids were due. Refilled lipitor x1 year but still encourage  "physical with PCP this fall.   - atorvastatin (LIPITOR) 10 MG tablet; Take 1 tablet (10 mg) by mouth daily  Dispense: 90 tablet; Refill: 4    6. Vitamin B12 deficiency  Takes vitamin b12 supplements, stable.     7. Class 1 obesity due to excess calories without serious comorbidity with body mass index (BMI) of 30.0 to 30.9 in adult  Lifestyle changes/diet/exercise recommended.      - No identified additional risk factors other than previously addressed    Additional Medication Instructions   - Statins: Continue taking on the day of surgery.    - Herbal medications and vitamins: DO NOT TAKE 14 days prior to surgery.     Don't take any NSAIDS, ibuprofen, aleve, naproxen for a week prior to surgery. If you needed anything for pain, you may take tylenol/acetaminophen up until surgery.     Recommendation  Approval given to proceed with proposed procedure, without further diagnostic evaluation.    Marry Souza is a 73 year old, presenting for the following:  Pre-Op Exam          8/7/2024     8:42 AM   Additional Questions   Roomed by DARREL Healy   Accompanied by Self     HPI related to upcoming procedure: she has been chronically dealing with bilateral lower extremity numbness that is intermittent and bothersome. The numbness if more bothersome than back pain. She has 3 spots on her back that are \"bone on bone\" with some back pain that is not debilitation. Surgery is scheduled for 8/28 with Dr. Barker in [place].     No recent febrile illnesses. No chest pain, palpitations, cough, shortness of breath. No dyspnea or angina with exertion. Takes baby aspirin, no other blood thinners or antiplatelets. She takes this due to her dad's history of a heart attack. Sheh as no personal history of cardiac problems.     No anesthesia problems with prior surgeries.         8/2/2024   Pre-Op Questionnaire   Have you ever had a heart attack or stroke? No   Have you ever had surgery on your heart or blood vessels, such as a stent " placement, a coronary artery bypass, or surgery on an artery in your head, neck, heart, or legs? No   Do you have chest pain with activity? No   Do you have a history of heart failure? No   Do you currently have a cold, bronchitis or symptoms of other infection? No   Do you have a cough, shortness of breath, or wheezing? No   Do you or anyone in your family have previous history of blood clots? No   Do you or does anyone in your family have a serious bleeding problem such as prolonged bleeding following surgeries or cuts? No   Have you ever had problems with anemia or been told to take iron pills? No   Have you had any abnormal blood loss such as black, tarry or bloody stools, or abnormal vaginal bleeding? No   Have you ever had a blood transfusion? No   Are you willing to have a blood transfusion if it is medically needed before, during, or after your surgery? Yes   Have you or any of your relatives ever had problems with anesthesia? No   Do you have sleep apnea, excessive snoring or daytime drowsiness? No   Do you have any artifical heart valves or other implanted medical devices like a pacemaker, defibrillator, or continuous glucose monitor? No   Do you have artificial joints? YES - left hip.    Are you allergic to latex? No        Health Care Directive  Patient has a Health Care Directive on file      Preoperative Review of    reviewed - no record of controlled substances prescribed.          Patient Active Problem List    Diagnosis Date Noted    Left hip pain 11/03/2023     Priority: Medium    Vitamin B12 deficiency 06/17/2019     Priority: Medium    H/O adenomatous polyp of colon 10/01/2018     Priority: Medium    Healthcare maintenance 09/26/2018     Priority: Medium    Obesity 01/25/2018     Priority: Medium      Past Medical History:   Diagnosis Date    Asymptomatic menopausal state     approximately age 47    GI bleed     Nevus of nose      Past Surgical History:   Procedure Laterality Date    ANKLE  "SURGERY  2012    achilles tendon repair and heel spur removal    ARTHROPLASTY HIP ANTERIOR Left 11/3/2023    Procedure: ARTHROPLASTY, HIP, TOTAL, DIRECT ANTERIOR APPROACH;  Surgeon: Billy Hamm MD;  Location: [facility] OR     SECTION      x three    COLONOSCOPY      08,Next colonoscopy due in .    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     Current Outpatient Medications   Medication Sig Dispense Refill    aspirin (ASA) 81 MG EC tablet Take 1 tablet (81 mg) by mouth daily      atorvastatin (LIPITOR) 10 MG tablet Take 1 tablet (10 mg) by mouth daily 90 tablet 4    CALCIUM PO       Calcium Polycarbophil (FIBER) 625 MG tablet Take by mouth daily      Cyanocobalamin (B-12 PO)       cyanocobalamin (VITAMIN B-12) 1000 MCG tablet Take 1 tablet (1,000 mcg) by mouth daily      magnesium oxide 400 MG CAPS Take 1 capsule by mouth daily         No Known Allergies     Social History     Tobacco Use    Smoking status: Never     Passive exposure: Past    Smokeless tobacco: Never    Tobacco comments:     no ecig   Substance Use Topics    Alcohol use: Not Currently       History   Drug Use No           Review of Systems  Constitutional, HEENT, cardiovascular, pulmonary, GI, , musculoskeletal, neuro, skin, endocrine and psych systems are negative, except as otherwise noted.    Objective    /86   Pulse 68   Temp 97.1  F (36.2  C) (Tympanic)   Resp 14   Ht 1.651 m (5' 5\")   Wt 84 kg (185 lb 3.2 oz)   LMP 2003 (Approximate)   SpO2 98%   Breastfeeding No   BMI 30.82 kg/m     Estimated body mass index is 30.82 kg/m  as calculated from the following:    Height as of this " "encounter: 1.651 m (5' 5\").    Weight as of this encounter: 84 kg (185 lb 3.2 oz).  Physical Exam  Vitals and nursing note reviewed.   Constitutional:       General: She is not in acute distress.     Appearance: Normal appearance. She is not ill-appearing.   Cardiovascular:      Rate and Rhythm: Normal rate and regular rhythm.      Heart sounds: Normal heart sounds. No murmur heard.  Pulmonary:      Effort: Pulmonary effort is normal. No respiratory distress.      Breath sounds: Normal breath sounds. No stridor. No wheezing, rhonchi or rales.   Chest:      Chest wall: No tenderness.   Musculoskeletal:         General: Normal range of motion.   Skin:     General: Skin is warm and dry.   Neurological:      General: No focal deficit present.      Mental Status: She is alert and oriented to person, place, and time.   Psychiatric:         Mood and Affect: Mood normal.         Behavior: Behavior normal.         Recent Labs   Lab Test 11/05/23  0511 11/04/23  0517 10/20/23  1331 08/16/23  0959   HGB 11.0* 12.3   < > 14.1    237  --  259    140   < > 143   POTASSIUM 3.7 4.5   < > 4.2   CR 0.64 0.76   < > 0.95   A1C  --   --   --  5.7    < > = values in this interval not displayed.        Diagnostics      Results for orders placed or performed in visit on 08/07/24   Lipid Panel     Status: Abnormal   Result Value Ref Range    Cholesterol 142 <200 mg/dL    Triglycerides 96 <150 mg/dL    Direct Measure HDL 48 (L) >=50 mg/dL    LDL Cholesterol Calculated 75 <=100 mg/dL    Non HDL Cholesterol 94 <130 mg/dL    Patient Fasting > 8hrs? Yes     Narrative    Cholesterol  Desirable:  <200 mg/dL    Triglycerides  Normal:  Less than 150 mg/dL  Borderline High:  150-199 mg/dL  High:  200-499 mg/dL  Very High:  Greater than or equal to 500 mg/dL    Direct Measure HDL  Female:  Greater than or equal to 50 mg/dL   Male:  Greater than or equal to 40 mg/dL    LDL Cholesterol  Desirable:  <100mg/dL  Above Desirable:  100-129 mg/dL "   Borderline High:  130-159 mg/dL   High:  160-189 mg/dL   Very High:  >= 190 mg/dL    Non HDL Cholesterol  Desirable:  130 mg/dL  Above Desirable:  130-159 mg/dL  Borderline High:  160-189 mg/dL  High:  190-219 mg/dL  Very High:  Greater than or equal to 220 mg/dL   Basic Metabolic Panel     Status: Abnormal   Result Value Ref Range    Sodium 143 135 - 145 mmol/L    Potassium 4.3 3.4 - 5.3 mmol/L    Chloride 109 (H) 98 - 107 mmol/L    Carbon Dioxide (CO2) 26 22 - 29 mmol/L    Anion Gap 8 7 - 15 mmol/L    Urea Nitrogen 15.0 8.0 - 23.0 mg/dL    Creatinine 0.85 0.51 - 0.95 mg/dL    GFR Estimate 73 >60 mL/min/1.73m2    Calcium 9.5 8.8 - 10.4 mg/dL    Glucose 109 (H) 70 - 99 mg/dL    Patient Fasting > 8hrs? Yes    CBC with platelets and differential     Status: None   Result Value Ref Range    WBC Count 6.7 4.0 - 11.0 10e3/uL    RBC Count 5.04 3.80 - 5.20 10e6/uL    Hemoglobin 14.5 11.7 - 15.7 g/dL    Hematocrit 45.3 35.0 - 47.0 %    MCV 90 78 - 100 fL    MCH 28.8 26.5 - 33.0 pg    MCHC 32.0 31.5 - 36.5 g/dL    RDW 13.9 10.0 - 15.0 %    Platelet Count 249 150 - 450 10e3/uL    % Neutrophils 54 %    % Lymphocytes 36 %    % Monocytes 7 %    % Eosinophils 2 %    % Basophils 1 %    % Immature Granulocytes 0 %    NRBCs per 100 WBC 0 <1 /100    Absolute Neutrophils 3.6 1.6 - 8.3 10e3/uL    Absolute Lymphocytes 2.4 0.8 - 5.3 10e3/uL    Absolute Monocytes 0.5 0.0 - 1.3 10e3/uL    Absolute Eosinophils 0.1 0.0 - 0.7 10e3/uL    Absolute Basophils 0.1 0.0 - 0.2 10e3/uL    Absolute Immature Granulocytes 0.0 <=0.4 10e3/uL    Absolute NRBCs 0.0 10e3/uL   EKG 12-lead complete w/read - Clinics     Status: None   Result Value Ref Range    Systolic Blood Pressure  mmHg    Diastolic Blood Pressure  mmHg    Ventricular Rate 67 BPM    Atrial Rate 67 BPM    VT Interval 148 ms    QRS Duration 84 ms     ms    QTc 418 ms    P Axis 54 degrees    R AXIS -63 degrees    T Axis 14 degrees    Interpretation ECG       Sinus rhythm  Left anterior  fascicular block  Abnormal ECG  When compared with ECG of 20-Oct-2023 13:40,  No significant change was found  Confirmed by MD SLOAN DARIN ([id number]) on 8/7/2024 1:42:23 PM     CBC and Differential     Status: None    Narrative    The following orders were created for panel order CBC and Differential.  Procedure                               Abnormality         Status                     ---------                               -----------         ------                     CBC with platelets and d...[[id number]]                      Final result                 Please view results for these tests on the individual orders.         Revised Cardiac Risk Index (RCRI)  The patient has the following serious cardiovascular risks for perioperative complications:   - No serious cardiac risks = 0 points     RCRI Interpretation: 0 points: Class I (very low risk - 0.4% complication rate)         Signed Electronically by: Monet Conway NP  A copy of this evaluation report is provided to the requesting physician.

## 2024-08-07 NOTE — Clinical Note
Surgical Information What procedure is being done? Laminectomy Facility or Hospital where procedure/surgery will be performed: [facility] Who is doing the procedure / surgery? Tom Barker Date of surgery / procedure: August 28, 2024 Time of surgery / procedure: Dont know yet Where do you plan to recover after surgery? at home with family    Fax number for surgical facility: [phone]

## 2024-08-28 ENCOUNTER — TRANSFERRED RECORDS (OUTPATIENT)
Dept: HEALTH INFORMATION MANAGEMENT | Facility: OTHER | Age: 74
End: 2024-08-28
Payer: MEDICARE

## 2024-09-13 ENCOUNTER — THERAPY VISIT (OUTPATIENT)
Dept: PHYSICAL THERAPY | Facility: OTHER | Age: 74
End: 2024-09-13
Attending: PHYSICIAN ASSISTANT
Payer: MEDICARE

## 2024-09-13 DIAGNOSIS — G89.29 CHRONIC BILATERAL LOW BACK PAIN, UNSPECIFIED WHETHER SCIATICA PRESENT: ICD-10-CM

## 2024-09-13 DIAGNOSIS — M54.50 CHRONIC BILATERAL LOW BACK PAIN, UNSPECIFIED WHETHER SCIATICA PRESENT: ICD-10-CM

## 2024-09-13 DIAGNOSIS — Z98.890 S/P LUMBAR LAMINECTOMY: Primary | ICD-10-CM

## 2024-09-13 PROCEDURE — 97161 PT EVAL LOW COMPLEX 20 MIN: CPT | Mod: GP,PO

## 2024-09-13 PROCEDURE — 97110 THERAPEUTIC EXERCISES: CPT | Mod: GP,PO

## 2024-09-13 NOTE — PROGRESS NOTES
PHYSICAL THERAPY EVALUATION  Type of Visit: Evaluation     Fall Risk Screen:  Fall screen completed by: PT  Have you fallen 2 or more times in the past year?: No  Have you fallen and had an injury in the past year?: No  Is patient a fall risk?: No    Subjective patient is a 73-year-old female who is status post lumbar laminectomies at L2-3, L3-4, L4-5 on 8/28/2024 by Dr. Tom Barker MD.  Patient reports that she is doing well and has soreness 1/10 at the low back and now intermittent numbness at the bilateral lower extremities.  Previously she was having low back pain with bilateral buttock to feet numbness, she attributed this initially to her hip which she had replaced in November 2023 but started with increase in the low back pain following replacement of the hip.  She has used Tylenol and ice for pain management and is sleeping in bed on her back or sides.  Patient has had help at home from family members as needed.  She is on a 20 pound lifting restriction until she is 6 weeks postoperative.      Presenting condition or subjective complaint: Lumbar back surgery  Date of onset: 08/28/24    Dates & types of surgery: Left hip surgery, november, 2023 back surgery august 2024    Prior therapy history for the same diagnosis, illness or injury: No      Living Environment  Social support: Alone   Type of home: 2-story   Stairs to enter the home:         Ramp: No   Stairs inside the home: Yes 7 Is there a railing: Yes     Help at home: None  Equipment owned: Straight Cane; Walker; Bath bench     Employment: No    Hobbies/Interests: Flowers, reading     Objective   LUMBAR SPINE EVALUATION  PAIN: 1/10, soreness at the lumbar area increased with longer amounts of walking  INTEGUMENTARY (edema, incisions):  Midline lumbar incision healing well slight area of scabbing at the inferior aspect  POSTURE: WNL  GAIT: WFL, stiff through the lumbar area with limited arm swing, improved once patient was instructed to work on gait  mechanics with arm swing to tolerance.  BALANCE/PROPRIOCEPTION:  Not tested  ROM:  Not tested, within functional limits for current restrictions.  STRENGTH:  Knee flexion/extension, hip flexion and ankle dorsiflexion within functional limits .  Core strength was not tested due to recent surgical status.  OBSERVATION: Initially stiff with stand to sit into a chair however improved following instruction.  Good bed mobility with rolling, patient does logroll when coming into a upright position.    Assessment & Plan   CLINICAL IMPRESSIONS  Medical Diagnosis: S/P lumbar laminectomy    Treatment Diagnosis: S/P lumbar laminectomy, chronic low back pain   Impression/Assessment: Patient is a 73 year old female with history of low back pain with radiation to the lower extremity, she is status post lumbar laminectomies with decreased strength and low back pain complaints.  The following significant findings have been identified: Pain, Decreased ROM/flexibility, and Decreased strength. These impairments interfere with their ability to perform self care tasks, recreational activities, household chores, and community mobility as compared to previous level of function.     Clinical Decision Making (Complexity):  Clinical Presentation: Stable/Uncomplicated  Clinical Presentation Rationale: based on medical and personal factors listed in PT evaluation  Clinical Decision Making (Complexity): Low complexity    PLAN OF CARE  Treatment Interventions:  Interventions: Neuromuscular Re-education, Therapeutic Exercise    Long Term Goals     PT Goal 1  Goal Identifier: Lifting and carrying  Goal Description: Patient was able to lift 20 pounds without low back pain symptoms to return to carrying groceries without limitations.  Target Date: 11/08/24  PT Goal 2  Goal Identifier: Mobility-bed making  Goal Description: Patient was able to make her bed without difficulty and pain 0/10 at the low back.  Target Date: 11/08/24  PT Goal 3  Goal  Identifier: Walking  Goal Description: Patient will be able to walk without limitation without low back pain symptoms to lower to be a community ambulator without limitation.  Target Date: 11/08/24      Frequency of Treatment: 1-2 times a week  Duration of Treatment: 8 weeks    Education Assessment:   Learner/Method: Patient;Listening;Demonstration    Risks and benefits of evaluation/treatment have been explained.   Patient/Family/caregiver agrees with Plan of Care.     Evaluation Time:     PT Eval, Low Complexity Minutes (88590): 20       Signing Clinician: RO[facility]                                                                                   OUTPATIENT PHYSICAL THERAPY      PLAN OF TREATMENT FOR OUTPATIENT REHABILITATION   Patient's Last Name, First Name, Libby Dodge YOB: 1950   Provider's Name   [facility]   Medical Record No.  [id number]     Onset Date: 08/28/24  Start of Care Date: 09/13/24     Medical Diagnosis:  S/P lumbar laminectomy      PT Treatment Diagnosis:  S/P lumbar laminectomy, chronic low back pain Plan of Treatment  Frequency/Duration: 1-2 times a week/ 8 weeks    Certification date from 09/13/24 to 11/08/24         See note for plan of treatment details and functional goals     Kaleigh Hernández, PT                         I CERTIFY THE NEED FOR THESE SERVICES FURNISHED UNDER        THIS PLAN OF TREATMENT AND WHILE UNDER MY CARE .             Physician Signature               Date    X_____________________________________________________                  Referring Provider:  Marcela Perdue    Initial Assessment  See Epic Evaluation- Start of Care Date: 09/13/24

## 2024-09-23 ENCOUNTER — THERAPY VISIT (OUTPATIENT)
Dept: PHYSICAL THERAPY | Facility: OTHER | Age: 74
End: 2024-09-23
Attending: PHYSICIAN ASSISTANT
Payer: MEDICARE

## 2024-09-23 DIAGNOSIS — Z98.890 S/P LUMBAR LAMINECTOMY: Primary | ICD-10-CM

## 2024-09-23 DIAGNOSIS — G89.29 CHRONIC BILATERAL LOW BACK PAIN, UNSPECIFIED WHETHER SCIATICA PRESENT: ICD-10-CM

## 2024-09-23 DIAGNOSIS — M54.50 CHRONIC BILATERAL LOW BACK PAIN, UNSPECIFIED WHETHER SCIATICA PRESENT: ICD-10-CM

## 2024-09-23 PROCEDURE — 97110 THERAPEUTIC EXERCISES: CPT | Mod: GP,PO

## 2024-09-25 ENCOUNTER — THERAPY VISIT (OUTPATIENT)
Dept: PHYSICAL THERAPY | Facility: OTHER | Age: 74
End: 2024-09-25
Attending: PHYSICIAN ASSISTANT
Payer: MEDICARE

## 2024-09-25 DIAGNOSIS — G89.29 CHRONIC BILATERAL LOW BACK PAIN, UNSPECIFIED WHETHER SCIATICA PRESENT: ICD-10-CM

## 2024-09-25 DIAGNOSIS — Z98.890 S/P LUMBAR LAMINECTOMY: Primary | ICD-10-CM

## 2024-09-25 DIAGNOSIS — M54.50 CHRONIC BILATERAL LOW BACK PAIN, UNSPECIFIED WHETHER SCIATICA PRESENT: ICD-10-CM

## 2024-09-25 PROCEDURE — 97110 THERAPEUTIC EXERCISES: CPT | Mod: GP,PO

## 2024-10-01 ENCOUNTER — THERAPY VISIT (OUTPATIENT)
Dept: PHYSICAL THERAPY | Facility: OTHER | Age: 74
End: 2024-10-01
Attending: PHYSICIAN ASSISTANT
Payer: MEDICARE

## 2024-10-01 DIAGNOSIS — G89.29 CHRONIC BILATERAL LOW BACK PAIN, UNSPECIFIED WHETHER SCIATICA PRESENT: ICD-10-CM

## 2024-10-01 DIAGNOSIS — M54.50 CHRONIC BILATERAL LOW BACK PAIN, UNSPECIFIED WHETHER SCIATICA PRESENT: ICD-10-CM

## 2024-10-01 DIAGNOSIS — Z98.890 S/P LUMBAR LAMINECTOMY: Primary | ICD-10-CM

## 2024-10-01 PROCEDURE — 97110 THERAPEUTIC EXERCISES: CPT | Mod: GP

## 2024-10-03 ENCOUNTER — THERAPY VISIT (OUTPATIENT)
Dept: PHYSICAL THERAPY | Facility: OTHER | Age: 74
End: 2024-10-03
Attending: PHYSICIAN ASSISTANT
Payer: MEDICARE

## 2024-10-03 DIAGNOSIS — Z98.890 S/P LUMBAR LAMINECTOMY: Primary | ICD-10-CM

## 2024-10-03 PROCEDURE — 97110 THERAPEUTIC EXERCISES: CPT | Mod: GP,CQ

## 2024-10-07 ENCOUNTER — THERAPY VISIT (OUTPATIENT)
Dept: PHYSICAL THERAPY | Facility: OTHER | Age: 74
End: 2024-10-07
Attending: PHYSICIAN ASSISTANT
Payer: MEDICARE

## 2024-10-07 DIAGNOSIS — Z98.890 S/P LUMBAR LAMINECTOMY: Primary | ICD-10-CM

## 2024-10-07 DIAGNOSIS — M54.50 CHRONIC BILATERAL LOW BACK PAIN, UNSPECIFIED WHETHER SCIATICA PRESENT: ICD-10-CM

## 2024-10-07 DIAGNOSIS — G89.29 CHRONIC BILATERAL LOW BACK PAIN, UNSPECIFIED WHETHER SCIATICA PRESENT: ICD-10-CM

## 2024-10-07 PROCEDURE — 97110 THERAPEUTIC EXERCISES: CPT | Mod: GP

## 2024-10-22 DIAGNOSIS — Z96.642 STATUS POST LEFT HIP REPLACEMENT: Primary | ICD-10-CM

## 2024-10-23 ENCOUNTER — OFFICE VISIT (OUTPATIENT)
Dept: ORTHOPEDICS | Facility: OTHER | Age: 74
End: 2024-10-23
Attending: ORTHOPAEDIC SURGERY
Payer: MEDICARE

## 2024-10-23 ENCOUNTER — HOSPITAL ENCOUNTER (OUTPATIENT)
Dept: GENERAL RADIOLOGY | Facility: OTHER | Age: 74
Discharge: HOME | End: 2024-10-23
Attending: ORTHOPAEDIC SURGERY
Payer: MEDICARE

## 2024-10-23 DIAGNOSIS — Z96.642 STATUS POST LEFT HIP REPLACEMENT: ICD-10-CM

## 2024-10-23 DIAGNOSIS — Z96.642 STATUS POST LEFT HIP REPLACEMENT: Primary | ICD-10-CM

## 2024-10-23 PROCEDURE — 99212 OFFICE O/P EST SF 10 MIN: CPT | Performed by: ORTHOPAEDIC SURGERY

## 2024-10-23 PROCEDURE — G0463 HOSPITAL OUTPT CLINIC VISIT: HCPCS

## 2024-10-23 PROCEDURE — G0463 HOSPITAL OUTPT CLINIC VISIT: HCPCS | Mod: 25

## 2024-10-23 PROCEDURE — 73502 X-RAY EXAM HIP UNI 2-3 VIEWS: CPT

## 2024-10-23 RX ORDER — AMOXICILLIN 500 MG/1
2000 CAPSULE ORAL ONCE
Qty: 4 CAPSULE | Refills: 1 | Status: SHIPPED | OUTPATIENT
Start: 2024-10-23 | End: 2024-10-23

## 2024-10-23 NOTE — PROGRESS NOTES
SUBJECTIVE:  Patient returns 1 year status post left total hip replacement.  Patient overall reports that she is doing well no significant pain is happy with the outcome of intervention.    ROS: Musculoskeletal and general review of systems are negative, per review of previous clinic questionnaire.  Denies SOB and calf pain.    EXAM: Left hip examination shows excellent range of motion.  No significant pain with walking.  Incisional site well-healed.  Only mild discomfort seen overlying her bursal region.    IMAGIN views left hip are reviewed patient's components are stable alignment and position at this current time    ASSESSMENT: Left hip replacement doing well    PLAN: Activities as tolerated.  Recheck x-rays only necessary if problems arise.  Otherwise plan for follow-up year 10 or thereabouts.    Billy Hamm MD

## 2024-11-14 SDOH — HEALTH STABILITY: PHYSICAL HEALTH: ON AVERAGE, HOW MANY DAYS PER WEEK DO YOU ENGAGE IN MODERATE TO STRENUOUS EXERCISE (LIKE A BRISK WALK)?: 3 DAYS

## 2024-11-14 SDOH — HEALTH STABILITY: PHYSICAL HEALTH: ON AVERAGE, HOW MANY MINUTES DO YOU ENGAGE IN EXERCISE AT THIS LEVEL?: 20 MIN

## 2024-11-14 ASSESSMENT — SOCIAL DETERMINANTS OF HEALTH (SDOH): HOW OFTEN DO YOU GET TOGETHER WITH FRIENDS OR RELATIVES?: TWICE A WEEK

## 2024-11-18 ASSESSMENT — PATIENT HEALTH QUESTIONNAIRE - PHQ9
SUM OF ALL RESPONSES TO PHQ QUESTIONS 1-9: 4
SUM OF ALL RESPONSES TO PHQ QUESTIONS 1-9: 4
10. IF YOU CHECKED OFF ANY PROBLEMS, HOW DIFFICULT HAVE THESE PROBLEMS MADE IT FOR YOU TO DO YOUR WORK, TAKE CARE OF THINGS AT HOME, OR GET ALONG WITH OTHER PEOPLE: NOT DIFFICULT AT ALL

## 2024-11-19 ENCOUNTER — OFFICE VISIT (OUTPATIENT)
Dept: FAMILY MEDICINE | Facility: OTHER | Age: 74
End: 2024-11-19
Attending: FAMILY MEDICINE
Payer: MEDICARE

## 2024-11-19 VITALS
OXYGEN SATURATION: 96 % | SYSTOLIC BLOOD PRESSURE: 130 MMHG | WEIGHT: 188.8 LBS | TEMPERATURE: 97.8 F | HEIGHT: 65 IN | RESPIRATION RATE: 14 BRPM | BODY MASS INDEX: 31.46 KG/M2 | HEART RATE: 74 BPM | DIASTOLIC BLOOD PRESSURE: 82 MMHG

## 2024-11-19 DIAGNOSIS — M54.17 LUMBOSACRAL RADICULOPATHY AT L5: ICD-10-CM

## 2024-11-19 DIAGNOSIS — E66.811 CLASS 1 OBESITY DUE TO EXCESS CALORIES WITHOUT SERIOUS COMORBIDITY WITH BODY MASS INDEX (BMI) OF 30.0 TO 30.9 IN ADULT: ICD-10-CM

## 2024-11-19 DIAGNOSIS — Z12.31 BREAST CANCER SCREENING BY MAMMOGRAM: ICD-10-CM

## 2024-11-19 DIAGNOSIS — E78.2 MIXED HYPERLIPIDEMIA: ICD-10-CM

## 2024-11-19 DIAGNOSIS — E66.09 CLASS 1 OBESITY DUE TO EXCESS CALORIES WITHOUT SERIOUS COMORBIDITY WITH BODY MASS INDEX (BMI) OF 30.0 TO 30.9 IN ADULT: ICD-10-CM

## 2024-11-19 DIAGNOSIS — M16.12 PRIMARY OSTEOARTHRITIS OF LEFT HIP: ICD-10-CM

## 2024-11-19 DIAGNOSIS — E53.8 VITAMIN B12 DEFICIENCY: ICD-10-CM

## 2024-11-19 DIAGNOSIS — Z00.00 ENCOUNTER FOR MEDICARE ANNUAL WELLNESS EXAM: Primary | ICD-10-CM

## 2024-11-19 LAB
ALBUMIN SERPL BCG-MCNC: 4.5 G/DL (ref 3.5–5.2)
ALBUMIN UR-MCNC: NEGATIVE MG/DL
ALP SERPL-CCNC: 120 U/L (ref 40–150)
ALT SERPL W P-5'-P-CCNC: 20 U/L (ref 0–50)
ANION GAP SERPL CALCULATED.3IONS-SCNC: 10 MMOL/L (ref 7–15)
APPEARANCE UR: CLEAR
AST SERPL W P-5'-P-CCNC: 21 U/L (ref 0–45)
BILIRUB SERPL-MCNC: 0.6 MG/DL
BILIRUB UR QL STRIP: NEGATIVE
BUN SERPL-MCNC: 13.8 MG/DL (ref 8–23)
CALCIUM SERPL-MCNC: 9.6 MG/DL (ref 8.8–10.4)
CHLORIDE SERPL-SCNC: 109 MMOL/L (ref 98–107)
CHOLEST SERPL-MCNC: 170 MG/DL
COLOR UR AUTO: ABNORMAL
CREAT SERPL-MCNC: 0.91 MG/DL (ref 0.51–0.95)
EGFRCR SERPLBLD CKD-EPI 2021: 67 ML/MIN/1.73M2
FASTING STATUS PATIENT QL REPORTED: YES
FASTING STATUS PATIENT QL REPORTED: YES
GLUCOSE SERPL-MCNC: 102 MG/DL (ref 70–99)
GLUCOSE UR STRIP-MCNC: NEGATIVE MG/DL
HCO3 SERPL-SCNC: 27 MMOL/L (ref 22–29)
HDLC SERPL-MCNC: 53 MG/DL
HGB UR QL STRIP: NEGATIVE
KETONES UR STRIP-MCNC: ABNORMAL MG/DL
LDLC SERPL CALC-MCNC: 98 MG/DL
LEUKOCYTE ESTERASE UR QL STRIP: NEGATIVE
NITRATE UR QL: NEGATIVE
NONHDLC SERPL-MCNC: 117 MG/DL
PH UR STRIP: 5.5 [PH] (ref 5–9)
POTASSIUM SERPL-SCNC: 3.8 MMOL/L (ref 3.4–5.3)
PROT SERPL-MCNC: 7.3 G/DL (ref 6.4–8.3)
SODIUM SERPL-SCNC: 146 MMOL/L (ref 135–145)
SP GR UR STRIP: 1.01 (ref 1–1.03)
TRIGL SERPL-MCNC: 97 MG/DL
UROBILINOGEN UR STRIP-MCNC: NORMAL MG/DL
VIT B12 SERPL-MCNC: 1492 PG/ML (ref 232–1245)

## 2024-11-19 PROCEDURE — 81003 URINALYSIS AUTO W/O SCOPE: CPT | Mod: ZL | Performed by: FAMILY MEDICINE

## 2024-11-19 PROCEDURE — 80061 LIPID PANEL: CPT | Mod: ZL | Performed by: FAMILY MEDICINE

## 2024-11-19 PROCEDURE — 80053 COMPREHEN METABOLIC PANEL: CPT | Mod: ZL | Performed by: FAMILY MEDICINE

## 2024-11-19 PROCEDURE — 82607 VITAMIN B-12: CPT | Mod: ZL | Performed by: FAMILY MEDICINE

## 2024-11-19 PROCEDURE — G0463 HOSPITAL OUTPT CLINIC VISIT: HCPCS | Performed by: FAMILY MEDICINE

## 2024-11-19 PROCEDURE — 36415 COLL VENOUS BLD VENIPUNCTURE: CPT | Mod: ZL | Performed by: FAMILY MEDICINE

## 2024-11-19 RX ORDER — OXYCODONE HYDROCHLORIDE 5 MG/1
5 TABLET ORAL EVERY 6 HOURS PRN
COMMUNITY
Start: 2024-08-29 | End: 2024-11-19

## 2024-11-19 RX ORDER — ATORVASTATIN CALCIUM 10 MG/1
10 TABLET, FILM COATED ORAL DAILY
Qty: 90 TABLET | Refills: 4 | Status: SHIPPED | OUTPATIENT
Start: 2024-11-19

## 2024-11-19 RX ORDER — METHOCARBAMOL 750 MG/1
750 TABLET, FILM COATED ORAL 4 TIMES DAILY PRN
COMMUNITY
Start: 2024-08-29 | End: 2024-11-19

## 2024-11-19 ASSESSMENT — PAIN SCALES - GENERAL: PAINLEVEL_OUTOF10: NO PAIN (0)

## 2024-11-19 NOTE — PROGRESS NOTES
Preventive Care Visit  [facility]  ARANZA FORTUNE MD, Family Medicine  Nov 19, 2024        ICD-10-CM    1. Encounter for Medicare annual wellness exam  Z00.00       2. Lumbosacral radiculopathy at L5  M54.17       3. Mixed hyperlipidemia  E78.2 atorvastatin (LIPITOR) 10 MG tablet     Lipid Profile     Comprehensive Metabolic Panel     UA Macroscopic with reflex to Microscopic and Culture     UA Macroscopic with reflex to Microscopic and Culture      4. Vitamin B12 deficiency  E53.8 Vitamin B12      5. Class 1 obesity due to excess calories without serious comorbidity with body mass index (BMI) of 30.0 to 30.9 in adult  E66.811     E66.09     Z68.30       6. Primary osteoarthritis of left hip  M16.12       7. Breast cancer screening by mammogram  Z12.31 MA Screening Bilateral w/ Epifanio        Patient has been doing much better since her surgeries this year, pain has markedly improved, overall, sleep has improved.  She has had minimal need for over-the-counter analgesics.  Her plan is to increase her activity.  Labs due today include monitoring labs for hyperlipidemia and B12 deficiency  Continue same dose of Lipitor.  Mammogram in January  Prevnar vaccine update fall 2025.    Marry Souza is a 73 year old, presenting for the following:  Medicare Visit (Annual well visit)        11/19/2024     1:08 PM   Additional Questions   Roomed by JANET Joseph  Libby Elliott is a 73 year old female in for [facility].  This past year she had back and hip surgery.  Now at its worst her pain gets to be a 2.        Hyperlipidemia Follow-Up    Are you regularly taking any medication or supplement to lower your cholesterol?   Yes- Lipitor   Are you having muscle aches or other side effects that you think could be caused by your cholesterol lowering medication?  No    3.  Vitamin B12 deficiency     Health Care Directive  Patient has a Health Care Directive on file  Advance care planning  document is on file and is current.      11/14/2024   General Health   How would you rate your overall physical health? Good   Feel stress (tense, anxious, or unable to sleep) To some extent      (!) STRESS CONCERN states this is about a 5.  Sometimes will affect her sleep       11/14/2024   Nutrition   Diet: Regular (no restrictions)            11/14/2024   Exercise   Days per week of moderate/strenous exercise 3 days   Average minutes spent exercising at this level 20 min            11/14/2024   Social Factors   Frequency of gathering with friends or relatives Twice a week   Worry food won't last until get money to buy more No   Food not last or not have enough money for food? No   Do you have housing? (Housing is defined as stable permanent housing and does not include staying ouside in a car, in a tent, in an abandoned building, in an overnight shelter, or couch-surfing.) Yes   Are you worried about losing your housing? No   Lack of transportation? No   Unable to get utilities (heat,electricity)? Yes   Want help with housing or utility concern? No      (!) FINANCIAL RESOURCE STRAIN CONCERN      11/14/2024   Fall Risk   Fallen 2 or more times in the past year? No     No    Trouble with walking or balance? No     No        Patient-reported    Multiple values from one day are sorted in reverse-chronological order          11/14/2024   Activities of Daily Living- Home Safety   Needs help with the following daily activites None of the above   Safety concerns in the home None of the above            11/14/2024   Dental   Dentist two times every year? Yes            11/14/2024   Hearing Screening   Hearing concerns? None of the above            11/14/2024   Driving Risk Screening   Patient/family members have concerns about driving No            11/14/2024   General Alertness/Fatigue Screening   Have you been more tired than usual lately? No            11/14/2024   Urinary Incontinence Screening   Bothered by leaking  urine in past 6 months No            11/14/2024   TB Screening   Were you born outside of the [place]? No          Today's PHQ-9 Score:       11/18/2024     2:36 PM   PHQ-9 SCORE   PHQ-9 Total Score MyChart 4 (Minimal depression)   PHQ-9 Total Score 4        Patient-reported         11/14/2024   Substance Use   Alcohol more than 3/day or more than 7/wk No   Do you have a current opioid prescription? No   How severe/bad is pain from 1 to 10? 0/10 (No Pain)   Do you use any other substances recreationally? No    (!) DECLINE       Multiple values from one day are sorted in reverse-chronological order     Social History     Tobacco Use    Smoking status: Never     Passive exposure: Past    Smokeless tobacco: Never    Tobacco comments:     no ecig   Vaping Use    Vaping status: Never Used   Substance Use Topics    Alcohol use: Not Currently    Drug use: No           1/25/2024   LAST FHS-7 RESULTS   1st degree relative breast or ovarian cancer Yes   Any relative bilateral breast cancer No   Any male have breast cancer No   Any ONE woman have BOTH breast AND ovarian cancer No   Any woman with breast cancer before 50yrs No   2 or more relatives with breast AND/OR ovarian cancer No   2 or more relatives with breast AND/OR bowel cancer No           Mammogram Screening - Mammogram every 1-2 years updated in Health Maintenance based on mutual decision making    ASCVD Risk   The 10-year ASCVD risk score (Farrukh GILLIS, et al., 2019) is: 10.2%    Values used to calculate the score:      Age: 73 years      Sex: Female      Is Non- : No      Diabetic: No      Tobacco smoker: No      Systolic Blood Pressure: 130 mmHg      Is BP treated: No      HDL Cholesterol: 48 mg/dL      Total Cholesterol: 142 mg/dL        Reviewed and updated as needed this visit by Provider                    Past Medical History:   Diagnosis Date    Asymptomatic menopausal state     approximately age 47    GI bleed     Nevus of nose       Past Surgical History:   Procedure Laterality Date    ANKLE SURGERY      achilles tendon repair and heel spur removal    ARTHROPLASTY HIP ANTERIOR Left 2023    Procedure: ARTHROPLASTY, HIP, TOTAL, DIRECT ANTERIOR APPROACH;  Surgeon: Billy Hamm MD;  Location: [facility] OR     SECTION      x three    COLONOSCOPY      08,Next colonoscopy due in 2018.    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     Current providers sharing in care for this patient include:  Patient Care Team:  Amna Mann MD as PCP - General (Family Medicine)  Balwinder Julian MD as Assigned Neuroscience Provider  Amna Mann MD as Assigned PCP  Billy Hamm MD as MD (Orthopaedic Surgery)  Eva Esqueda PA-C as Physician Assistant (Family Medicine)  Billy Hamm MD as Assigned Musculoskeletal Provider    The following health maintenance items are reviewed in Epic and correct as of today:  Health Maintenance   Topic Date Due    LIPID  2025    MEDICARE ANNUAL WELLNESS VISIT  2025    FALL RISK ASSESSMENT  2025    MAMMO SCREENING  2026    DEXA  10/14/2026    GLUCOSE  2027    RSV VACCINE (1 - 1-dose 75+ series) 2027    COLORECTAL CANCER SCREENING  10/27/2028    ADVANCE CARE PLANNING  2029    DTAP/TDAP/TD IMMUNIZATION (2 - Td or Tdap) 10/10/2033    HEPATITIS C SCREENING  Completed    PHQ-2 (once per calendar year)  Completed    INFLUENZA VACCINE  Completed    Pneumococcal Vaccine: 65+ Years  Completed    ZOSTER IMMUNIZATION  Completed    COVID-19 Vaccine  Completed    HPV IMMUNIZATION  Aged Out  "   MENINGITIS IMMUNIZATION  Aged Out    RSV MONOCLONAL ANTIBODY  Aged Out         Review of Systems  Glasses and starts of cataracts  Last dentist  - tries to go twice a year  GI -no concerns     - nocturia  Musculoskeletal - see above      Objective    Exam  /82 (BP Location: Right arm, Patient Position: Sitting, Cuff Size: Adult Regular)   Pulse 74   Temp 97.8  F (36.6  C) (Temporal)   Resp 14   Ht 1.651 m (5' 5\")   Wt 85.6 kg (188 lb 12.8 oz)   LMP 08/16/2003 (Approximate)   SpO2 96%   Breastfeeding No   BMI 31.42 kg/m     Estimated body mass index is 31.42 kg/m  as calculated from the following:    Height as of this encounter: 1.651 m (5' 5\").    Weight as of this encounter: 85.6 kg (188 lb 12.8 oz).    Physical Exam  GENERAL: alert and no distress  EYES: Eyes grossly normal to inspection, PERRL and conjunctivae and sclerae normal  HENT: ear canals and TM's normal, nose and mouth without ulcers or lesions  NECK: no adenopathy, no asymmetry, masses, or scars  RESP: lungs clear to auscultation - no rales, rhonchi or wheezes  CV: regular rate and rhythm, normal S1 S2, no S3 or S4, no murmur, click or rub, no peripheral edema  ABDOMEN: soft, nontender, no hepatosplenomegaly, no masses and bowel sounds normal  MS: well healed scar on her back  SKIN: nodules and tags on her back   NEURO: Normal strength and tone, mentation intact and speech normal  PSYCH: mentation appears normal, affect normal/bright        11/19/2024   Mini Cog   Clock Draw Score 2 Normal   3 Item Recall 3 objects recalled   Mini Cog Total Score 5              11/19/2024   Vision Screen   Reason Vision Screen Not Completed --    --       Multiple values from one day are sorted in reverse-chronological order       Signed Electronically by: ARANZA FORTUNE MD    Answers submitted by the patient for this visit:  Patient Health Questionnaire (Submitted on 11/18/2024)  If you checked off any problems, how difficult have these " problems made it for you to do your work, take care of things at home, or get along with other people?: Not difficult at all  PHQ9 TOTAL SCORE: 4

## 2024-11-19 NOTE — NURSING NOTE
"Chief Complaint   Patient presents with    Medicare Visit     Annual well visit       Initial /82 (BP Location: Right arm, Patient Position: Sitting, Cuff Size: Adult Regular)   Pulse 74   Temp 97.8  F (36.6  C) (Temporal)   Resp 14   Ht 1.651 m (5' 5\")   Wt 85.6 kg (188 lb 12.8 oz)   LMP 08/16/2003 (Approximate)   SpO2 96%   Breastfeeding No   BMI 31.42 kg/m   Estimated body mass index is 31.42 kg/m  as calculated from the following:    Height as of this encounter: 1.651 m (5' 5\").    Weight as of this encounter: 85.6 kg (188 lb 12.8 oz).    Medication Review: complete    The next two questions are to help us understand your food security.  If you are feeling you need any assistance in this area, we have resources available to support you today.          11/14/2024   SDOH- Food Insecurity   Within the past 12 months, did you worry that your food would run out before you got money to buy more? N    Within the past 12 months, did the food you bought just not last and you didn t have money to get more? N        Patient-reported       Health Care Directive:  Patient has a Health Care Directive on file      Paty Feliciano LPN      "

## 2024-11-19 NOTE — PATIENT INSTRUCTIONS
Services Typically covered by Medicare Recommended Completed   Vaccines  Pneumonoccol x2  Influenza  Hepatitis B (if medium/high risk)  Covid  RSV  Tetanus/pertussis   Some vaccines are covered in the clinic, others at your pharmacy      Mammogram Covered: One-time screen between age 35-39, annually for age 40+     Pap and Pelvic Exam Covered: Annually if  high risk,  or childbearing age with abnormal Pap in last 3 years.  Q24 months for all other women      Prostate Cancer Screening  Digital rectal exam  PSA Covered: Annually for all men > age 50     Corolrectal Cancer Screening Colonoscopy every 10 years, more often for high risk patients; screening cologuard every 3 years     Diabetes Self-Management Training Requires referral by treating physician for patient with diabetes     Diabetes Screening  Fasting blood sugar or glucose tolerance test   Once yearly, twice yearly if prediabetic     Cardiovascular Screening Blood Tests  Total Cholesterol  HDL  Triglycerides Every 5 years     Medical Nutrition Therapy for Diabetes or Renal Disease Requires referral by treating physician for patient with diabetes or kidney disease     Glaucoma Screening Annually for patients with one of the following risk factors:  Diabetes Mellitus  Family history of Glaucoma  -American age 50 and over  -American age 65 and over     Bone Mass Measurement Every 24 months if one of the following risk factors:  Estrogen deficiency  Vertebral abnormalities on x-ray indicative of Osteoporosis, Osteopenia, or Vertebral fracture  Receiving/expected to receive the equivalent of at least 5 mg of Prednisone per day for > 3 months  Hyperparathyroidism  Patient being monitored for response to Osteoporosis Therapy     One-time AAA screen  Must be ordered as part of Medicare IPPE Any patient with a family history of AAA  Males Age 65-75, with history of smoking at least 100 cigarettes in lifetime     Smoking Cessation Counseling  Beneficiaries who use tobacco are eligible to receive 2 cessation attempts per year; each attempt includes maximum of 4 sessions     HIV Screening Annually for beneficiaries at increased risk:       Increased risk for HIV infection is defined in the   Three times per pregnancy for beneficiaries who are pregnant.     Future Annual Wellness Visit Annually, for all beneficiaries.

## 2024-12-11 PROBLEM — Z98.890 S/P LUMBAR LAMINECTOMY: Status: RESOLVED | Noted: 2024-09-13 | Resolved: 2024-12-11

## 2024-12-22 ENCOUNTER — OFFICE VISIT (OUTPATIENT)
Dept: FAMILY MEDICINE | Facility: OTHER | Age: 74
End: 2024-12-22
Attending: STUDENT IN AN ORGANIZED HEALTH CARE EDUCATION/TRAINING PROGRAM
Payer: MEDICARE

## 2024-12-22 VITALS
HEART RATE: 75 BPM | RESPIRATION RATE: 18 BRPM | BODY MASS INDEX: 30.49 KG/M2 | WEIGHT: 183 LBS | SYSTOLIC BLOOD PRESSURE: 138 MMHG | OXYGEN SATURATION: 98 % | HEIGHT: 65 IN | DIASTOLIC BLOOD PRESSURE: 88 MMHG | TEMPERATURE: 98 F

## 2024-12-22 DIAGNOSIS — H69.93 DYSFUNCTION OF BOTH EUSTACHIAN TUBES: ICD-10-CM

## 2024-12-22 DIAGNOSIS — J06.9 VIRAL URI WITH COUGH: Primary | ICD-10-CM

## 2024-12-22 PROCEDURE — G0463 HOSPITAL OUTPT CLINIC VISIT: HCPCS

## 2024-12-22 ASSESSMENT — PAIN SCALES - GENERAL: PAINLEVEL_OUTOF10: NO PAIN (0)

## 2024-12-22 NOTE — NURSING NOTE
"Chief Complaint   Patient presents with    URI     Patient presents to clinic complaining of a cold that has lasted for the past two weeks. Reports congestion has settled in her head and is having difficulty hearing.       Initial /88   Pulse 75   Temp 98  F (36.7  C) (Tympanic)   Resp 18   Ht 1.651 m (5' 5\")   Wt 83 kg (183 lb)   LMP 08/16/2003 (Approximate)   SpO2 98%   BMI 30.45 kg/m   Estimated body mass index is 30.45 kg/m  as calculated from the following:    Height as of this encounter: 1.651 m (5' 5\").    Weight as of this encounter: 83 kg (183 lb).  Medication Review: complete    The next two questions are to help us understand your food security.  If you are feeling you need any assistance in this area, we have resources available to support you today.          11/14/2024   SDOH- Food Insecurity   Within the past 12 months, did you worry that your food would run out before you got money to buy more? N   Within the past 12 months, did the food you bought just not last and you didn t have money to get more? N         Health Care Directive:  Patient has a Health Care Directive on file      Molly Terrell LPN      "

## 2024-12-22 NOTE — PROGRESS NOTES
ASSESSMENT/PLAN:    I have reviewed the nursing notes.  I have reviewed the findings, diagnosis, plan and need for follow up with the patient.    1. Viral URI with cough (Primary)    Patient presents with upper respiratory symptoms that are improving.  Patient's vitals are stable and she appears nontoxic. Discussed with patient that symptoms and exam are consistent with viral illness.  Discussed that symptomatic treatment of cough is appropriate but not with antibiotics. Discussed symptomatic treatment - Encouraged fluids, salt water gargles, honey (only if greater than 1 year in age due to risk of botulism), elevation, humidifier, sinus rinse/netti pot, lozenges, tea, topical vapor rub, popsicles, rest, etc. May use over-the-counter Tylenol or ibuprofen PRN.    2. Dysfunction of both eustachian tubes    Patient has also felt pressure in her ears.  Physical exam indicates bilateral eustachian tube dysfunction.  Advised patient to try an over-the-counter antihistamine such as Claritin or Zyrtec and Flonase nasal spray to help open up her eustachian tubes.  Advised patient to also try moving her jaw via chewing, yawning, and talking.    Discussed warning signs/symptoms indicative of need to f/u    Follow up if symptoms persist or worsen or concerns    I explained my diagnostic considerations and recommendations to the patient, who voiced understanding and agreement with the treatment plan. All questions were answered. We discussed potential side effects of any prescribed or recommended therapies, as well as expectations for response to treatments.    ELEANOR Rubio CNP  12/22/2024  9:46 AM    HPI:    Libby Elliott is a 74 year old female  who [facility] for concerns of URI symptoms    URI, x 2 weeks    Symptoms:  No fevers or chills.   YES: +  sore throat/pharyngitis/tonsillitis.   YES: +  allergy/URI Symptoms  YES: +  muffled sounds/change in hearing  YES: +  sensation of fullness in  ear(s)  No ringing in ears/tinnitus  No dizziness  YES: +  congestion (head/nasal/chest)  YES: +  cough/productive cough  YES: +  post nasal drip   YES: +  headache  No sinus pain/pressure  No myalgias  YES: +  otalgia  No rash  Activity Level Changes: Yes: fatigue  Appetite/Liquid Intake Changes: Yes: decreased  Changes to Bowel Habits: No  Changes to Bladder Habits: No  Additional Symptoms to Report: No  Prior workup: No    Treatments tried: OTC Cough med, Fluids, and Rest    Site of exposure: daughter  Type of exposure: not known    Other Pertinent History: none    Allergies: NKA    PCP: Basilio Barney    Past Medical History:   Diagnosis Date    Asymptomatic menopausal state     approximately age 47    GI bleed     Nevus of nose      Past Surgical History:   Procedure Laterality Date    ANKLE SURGERY  2012    achilles tendon repair and heel spur removal    ARTHROPLASTY HIP ANTERIOR Left 2023    Procedure: ARTHROPLASTY, HIP, TOTAL, DIRECT ANTERIOR APPROACH;  Surgeon: Billy Hamm MD;  Location: [facility] OR     SECTION      x three    COLONOSCOPY      08,Next colonoscopy due in .    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     Social History     Tobacco Use    Smoking status: Never     Passive exposure: Past    Smokeless tobacco: Never    Tobacco comments:     no ecig   Substance Use Topics    Alcohol use: Not Currently     Current Outpatient Medications   Medication Sig Dispense Refill    aspirin (ASA) 81 MG EC tablet Take 1 tablet (81 mg) by mouth daily      atorvastatin (LIPITOR) 10 MG tablet Take 1 tablet (10 mg) by mouth daily. 90 tablet 4  "   CALCIUM PO       Calcium Polycarbophil (FIBER) 625 MG tablet Take by mouth daily      Cyanocobalamin (B-12 PO)       cyanocobalamin (VITAMIN B-12) 1000 MCG tablet Take 1 tablet (1,000 mcg) by mouth daily      magnesium oxide 400 MG CAPS Take 1 capsule by mouth daily       No Known Allergies  Past medical history, past surgical history, current medications and allergies reviewed and accurate to the best of my knowledge.      ROS:  Refer to HPI    /88   Pulse 75   Temp 98  F (36.7  C) (Tympanic)   Resp 18   Ht 1.651 m (5' 5\")   Wt 83 kg (183 lb)   LMP 08/16/2003 (Approximate)   SpO2 98%   BMI 30.45 kg/m      EXAM:  General Appearance: Well appearing 74 year old female, appropriate appearance for age. No acute distress   Ears: Left TM intact, translucent with bony landmarks appreciated, no erythema, mild effusion and bulging, no purulence.  Right TM intact, translucent with bony landmarks appreciated, no erythema, mild effusion and bulging, no purulence.  Left auditory canal clear.  Right auditory canal clear.  Normal external ears, non tender.  Eyes: conjunctivae normal without erythema or irritation, corneas clear, no drainage or crusting, no eyelid swelling, pupils equal   Oropharynx: moist mucous membranes, posterior pharynx without erythema, tonsils symmetric and 1+, no erythema, no exudates or petechiae, no post nasal drip seen, no trismus, voice clear.    Sinuses:  No sinus tenderness upon palpation of the frontal or maxillary sinuses  Nose:  Bilateral nares: no erythema, no edema, no drainage or congestion   Neck: supple without adenopathy  Respiratory: normal chest wall and respirations.  Normal effort.  Clear to auscultation bilaterally, no wheezing, crackles or rhonchi.  No increased work of breathing.  No cough appreciated.  Cardiac: RRR with no murmurs   Musculoskeletal:  Equal movement of bilateral upper extremities.  Equal movement of bilateral lower extremities.  Normal gait.  "   Dermatological: no rashes noted of exposed skin  Neuro: Alert and oriented to person, place, and time.    Psychological: normal affect, alert, oriented, and pleasant.

## 2025-01-27 ENCOUNTER — HOSPITAL ENCOUNTER (OUTPATIENT)
Dept: MAMMOGRAPHY | Facility: OTHER | Age: 75
Discharge: HOME | End: 2025-01-27
Attending: FAMILY MEDICINE | Admitting: FAMILY MEDICINE
Payer: MEDICARE

## 2025-01-27 DIAGNOSIS — Z12.31 BREAST CANCER SCREENING BY MAMMOGRAM: ICD-10-CM

## 2025-01-27 PROCEDURE — 77063 BREAST TOMOSYNTHESIS BI: CPT

## 2025-02-17 ENCOUNTER — OFFICE VISIT (OUTPATIENT)
Dept: FAMILY MEDICINE | Facility: OTHER | Age: 75
End: 2025-02-17
Payer: MEDICARE

## 2025-02-17 VITALS
DIASTOLIC BLOOD PRESSURE: 89 MMHG | RESPIRATION RATE: 18 BRPM | HEIGHT: 66 IN | TEMPERATURE: 97.9 F | OXYGEN SATURATION: 97 % | BODY MASS INDEX: 28.96 KG/M2 | WEIGHT: 180.2 LBS | HEART RATE: 99 BPM | SYSTOLIC BLOOD PRESSURE: 135 MMHG

## 2025-02-17 DIAGNOSIS — N39.0 ACUTE UTI (URINARY TRACT INFECTION): ICD-10-CM

## 2025-02-17 DIAGNOSIS — R39.9 UTI SYMPTOMS: Primary | ICD-10-CM

## 2025-02-17 LAB
ALBUMIN UR-MCNC: 70 MG/DL
APPEARANCE UR: ABNORMAL
BACTERIA #/AREA URNS HPF: ABNORMAL /HPF
BILIRUB UR QL STRIP: NEGATIVE
COLOR UR AUTO: ABNORMAL
GLUCOSE UR STRIP-MCNC: NEGATIVE MG/DL
HGB UR QL STRIP: ABNORMAL
HYALINE CASTS: 59 /LPF
KETONES UR STRIP-MCNC: NEGATIVE MG/DL
LEUKOCYTE ESTERASE UR QL STRIP: ABNORMAL
NITRATE UR QL: NEGATIVE
PH UR STRIP: 5.5 [PH] (ref 5–9)
RBC URINE: >182 /HPF
SP GR UR STRIP: 1.02 (ref 1–1.03)
UROBILINOGEN UR STRIP-MCNC: NORMAL MG/DL
WBC URINE: >182 /HPF

## 2025-02-17 PROCEDURE — G0463 HOSPITAL OUTPT CLINIC VISIT: HCPCS

## 2025-02-17 PROCEDURE — 81001 URINALYSIS AUTO W/SCOPE: CPT | Mod: ZL | Performed by: NURSE PRACTITIONER

## 2025-02-17 PROCEDURE — 99213 OFFICE O/P EST LOW 20 MIN: CPT | Performed by: NURSE PRACTITIONER

## 2025-02-17 PROCEDURE — 87186 SC STD MICRODIL/AGAR DIL: CPT | Mod: ZL | Performed by: NURSE PRACTITIONER

## 2025-02-17 RX ORDER — CEFDINIR 300 MG/1
300 CAPSULE ORAL 2 TIMES DAILY
Qty: 14 CAPSULE | Refills: 0 | Status: SHIPPED | OUTPATIENT
Start: 2025-02-17 | End: 2025-02-24

## 2025-02-17 ASSESSMENT — ENCOUNTER SYMPTOMS
GASTROINTESTINAL NEGATIVE: 1
RESPIRATORY NEGATIVE: 1
ENDOCRINE NEGATIVE: 1
CARDIOVASCULAR NEGATIVE: 1
HEMATOLOGIC/LYMPHATIC NEGATIVE: 1
EYES NEGATIVE: 1
PSYCHIATRIC NEGATIVE: 1
DYSURIA: 1
MUSCULOSKELETAL NEGATIVE: 1
CONSTITUTIONAL NEGATIVE: 1
NEUROLOGICAL NEGATIVE: 1
FREQUENCY: 1

## 2025-02-17 ASSESSMENT — PAIN SCALES - GENERAL: PAINLEVEL_OUTOF10: NO PAIN (0)

## 2025-02-17 NOTE — PROGRESS NOTES
Libby THAKUR Alda  1950    ASSESSMENT/PLAN      [facility] with urinary urgency, frequency with noted hematuria.  Patient has not had fever, chills, nausea, abdominal pain or vomiting, diarrhea or constipation.  Patient has not had a history of urinary tract infections.  No recent changes.  Patient is postmenopausal, not sexually active.  Urinalysis obtained and shows positive leukocyte esterase, white blood cells and blood.  Will treat for infection with Omnicef.  Will await urine culture results.  Patient's vitals are stable and she appears nontoxic.        1. UTI symptoms (Primary)    - UA Macroscopic with reflex to Microscopic and Culture  - Urine Culture    2. Acute UTI (urinary tract infection)    - cefdinir (OMNICEF) 300 MG capsule; Take 1 capsule (300 mg) by mouth 2 times daily for 7 days.  Dispense: 14 capsule; Refill: 0  - May use over-the-counter Tylenol or ibuprofen PRN  - Follow up as needed for new or worsening symptoms        *A shared decision making model was used.   *Patient and/or associated parties understood and were agreeable to treatment plan.   *Plan and all results were discussed.   *Explanation of diagnosis, treatment options and risk and benefits of medications reviewed with patient.    *Time was taken to answer all questions.   *Red flags symptoms were discussed and patient was advised when they should return for reevaluation or for prompt emergency evaluation.   *Patient was given verbal and written instructions on plan of care. Instructions were printed or are available on Mychart on electronic AVS.   *We discussed potential side effects of any prescribed or recommended therapies, as well as expectations for response to treatments.  *Patient discharged in stable condition    Lily Rodriguez CNP  [facility]    SUBJECTIVE  CHIEF COMPLAINT/ REASON FOR VISIT  Patient presents with:  UTI: Blood in urine, low back pain, frequency, cramping      HISTORY OF  "PRESENT ILLNESS  Libby Elliott is a pleasant 74 year old female [facility] with urinary urgency, frequency with noted hematuria.  Patient has not had fever, chills, nausea, abdominal pain or vomiting, diarrhea or constipation.  Patient has not had a history of urinary tract infections.  No recent changes.  Patient is postmenopausal, not sexually active.      I have reviewed the nursing notes.  I have reviewed allergies, medication list, problem list, and past medical history.    REVIEW OF SYSTEMS  Review of Systems   Constitutional: Negative.    HENT: Negative.     Eyes: Negative.    Respiratory: Negative.     Cardiovascular: Negative.    Gastrointestinal: Negative.    Endocrine: Negative.    Genitourinary:  Positive for dysuria, frequency and urgency.   Musculoskeletal: Negative.    Neurological: Negative.    Hematological: Negative.    Psychiatric/Behavioral: Negative.     All other systems reviewed and are negative.       VITAL SIGNS  Vitals:    02/17/25 1703 02/17/25 1706   BP: (!) 147/87 135/89   BP Location: Right arm Right arm   Patient Position: Sitting Sitting   Cuff Size: Adult Regular Adult Regular   Pulse: 99    Resp: 18    Temp: 97.9  F (36.6  C)    TempSrc: Temporal    SpO2: 97%    Weight: 81.7 kg (180 lb 3.2 oz)    Height: 1.664 m (5' 5.5\")       Body mass index is 29.53 kg/m .      OBJECTIVE  PHYSICAL EXAM  Physical Exam  Vitals and nursing note reviewed.   Constitutional:       Appearance: Normal appearance.   HENT:      Head: Normocephalic.      Nose: Nose normal.      Mouth/Throat:      Mouth: Mucous membranes are moist.   Eyes:      Pupils: Pupils are equal, round, and reactive to light.   Cardiovascular:      Rate and Rhythm: Normal rate.      Pulses: Normal pulses.      Heart sounds: Normal heart sounds.   Pulmonary:      Effort: Pulmonary effort is normal.      Breath sounds: Normal breath sounds.   Abdominal:      General: Bowel sounds are normal.      Palpations: Abdomen " is soft.   Musculoskeletal:         General: Normal range of motion.   Skin:     General: Skin is warm and dry.      Capillary Refill: Capillary refill takes less than 2 seconds.   Neurological:      General: No focal deficit present.      Mental Status: She is alert.            DIAGNOSTICS  Results for orders placed or performed in visit on 02/17/25   UA Macroscopic with reflex to Microscopic and Culture     Status: Abnormal    Specimen: Urine, Clean Catch   Result Value Ref Range    Color Urine Orange (A) Colorless, Straw, Light Yellow, Yellow    Appearance Urine Cloudy (A) Clear    Glucose Urine Negative Negative mg/dL    Bilirubin Urine Negative Negative    Ketones Urine Negative Negative mg/dL    Specific Gravity Urine 1.021 1.000 - 1.030    Blood Urine Large (A) Negative    pH Urine 5.5 5.0 - 9.0    Protein Albumin Urine 70 (A) Negative mg/dL    Urobilinogen Urine Normal Normal, 2.0 mg/dL    Nitrite Urine Negative Negative    Leukocyte Esterase Urine Large (A) Negative    Bacteria Urine Few (A) None Seen /HPF    RBC Urine >182 (H) <=2 /HPF    WBC Urine >182 (H) <=5 /HPF    Hyaline Casts Urine 59 (H) <=2 /LPF    Narrative    Urine Culture ordered based on laboratory criteria

## 2025-02-17 NOTE — PATIENT INSTRUCTIONS
Urinary Tract Infection (UTI) in Women: Care Instructions  Overview     A urinary tract infection (UTI) is an infection caused by bacteria. It can happen anywhere in the urinary tract. A UTI can happen in the:  Kidneys.  Ureters, the tubes that connect the kidneys to the bladder.  Bladder.  Urethra, where the urine comes out.  Most UTIs are bladder infections. They often cause pain or burning when you urinate.  Most UTIs can be cured with antibiotics. If you are prescribed antibiotics, be sure to complete your treatment so that the infection does not get worse.  Follow-up care is a key part of your treatment and safety. Be sure to make and go to all appointments, and call your doctor if you are having problems. It's also a good idea to know your test results and keep a list of the medicines you take.  How can you care for yourself at home?  Take your antibiotics as directed. Do not stop taking them just because you feel better. You need to take the full course of antibiotics.  Drink extra water and other fluids for the next day or two. This will help make the urine less concentrated and help wash out the bacteria that are causing the infection. (If you have kidney, heart, or liver disease and have to limit fluids, talk with your doctor before you increase the amount of fluids you drink.)  Avoid drinks that are carbonated or have caffeine. They can irritate the bladder.  Urinate often. Try to empty your bladder each time.  To relieve pain, take a hot bath or lay a heating pad set on low over your lower belly or genital area. Never go to sleep with a heating pad in place.  To prevent UTIs  Drink plenty of water each day. This helps you urinate often, which clears bacteria from your system. (If you have kidney, heart, or liver disease and have to limit fluids, talk with your doctor before you increase the amount of fluids you drink.)  Urinate when you need to.  If you are sexually active, urinate right after you have  "sex.  Change sanitary pads often.  Avoid douches, bubble baths, feminine hygiene sprays, and other feminine hygiene products that have deodorants.  After going to the bathroom, wipe from front to back.  When should you call for help?   Call your doctor now or seek immediate medical care if:    You have new or worse fever, chills, nausea, or vomiting.     You have new pain in your back just below your rib cage. This is called flank pain.     There is new blood or pus in your urine.     You have any problems with your antibiotic medicine.   Watch closely for changes in your health, and be sure to contact your doctor if:    You are not getting better after taking an antibiotic for 2 days.     Your symptoms go away but then come back.   Where can you learn more?  Go to https://www.[employer].net/patiented  Enter K848 in the search box to learn more about \"Urinary Tract Infection (UTI) in Women: Care Instructions.\"  Current as of: April 30, 2024  Content Version: 14.3    2024 [employer].   Care instructions adapted under license by your healthcare professional. If you have questions about a medical condition or this instruction, always ask your healthcare professional. [employer] disclaims any warranty or liability for your use of this information.    "

## 2025-02-17 NOTE — NURSING NOTE
"Chief Complaint   Patient presents with    UTI     Blood in urine, low back pain, frequency, cramping    [facility] for concerns of a UTI. Patient notes symptoms of blood in the urine, frequency, low back pain and cramping starting on Friday. Blood in the urine started today.     Initial /89 (BP Location: Right arm, Patient Position: Sitting, Cuff Size: Adult Regular)   Pulse 99   Temp 97.9  F (36.6  C) (Temporal)   Resp 18   Ht 1.664 m (5' 5.5\")   Wt 81.7 kg (180 lb 3.2 oz)   LMP 08/16/2003 (Approximate)   SpO2 97%   BMI 29.53 kg/m   Estimated body mass index is 29.53 kg/m  as calculated from the following:    Height as of this encounter: 1.664 m (5' 5.5\").    Weight as of this encounter: 81.7 kg (180 lb 3.2 oz).  Medication Review: complete    The next two questions are to help us understand your food security.  If you are feeling you need any assistance in this area, we have resources available to support you today.          2/17/2025   SDOH- Food Insecurity   Within the past 12 months, did you worry that your food would run out before you got money to buy more? N   Within the past 12 months, did the food you bought just not last and you didn t have money to get more? N         Health Care Directive:  Patient has a Health Care Directive on file      Socrates Trevino      "

## 2025-02-19 LAB — BACTERIA UR CULT: ABNORMAL

## (undated) DEVICE — SLEEVE COMPRESSION SCD KNEE MED 74022

## (undated) DEVICE — SUCTION MANIFOLD NEPTUNE 2 SYS 4 PORT 0702-020-000

## (undated) DEVICE — ENDO KIT COMPLIANCE DYKENDOCMPLY

## (undated) DEVICE — ENDO BRUSH CHANNEL MASTER CLEANING 2-4.2MM BW-412T

## (undated) DEVICE — Device

## (undated) DEVICE — SOL WATER 1500ML

## (undated) DEVICE — ESU ELEC BLADE 6" COATED E1450-6

## (undated) DEVICE — HANDPIECE INTERPULSE W/HIGH FLOW TIP & SUCTION

## (undated) DEVICE — SU VICRYL 2-0 CT-1 36" UND J945H

## (undated) DEVICE — SU DERMABOND ADVANCED .7ML DNX12

## (undated) DEVICE — PAD FLOOR SURGISAFE 46X40" 84610

## (undated) DEVICE — BLADE SAW OSCIL/SAG STRK 25X90X1.27MM 4125-127-090

## (undated) DEVICE — DRAPE C-ARM PACK 9"

## (undated) DEVICE — ESU GROUND PAD ADULT W/CORD E7507

## (undated) DEVICE — ESU BIPOLAR SEALER AQUAMANTYS 6MM 23-112-1

## (undated) DEVICE — PENCIL MEGADYNE TELESCOPING SMOKE EVACUATION 10 FT 251010J

## (undated) DEVICE — HOOD T4 PROTECTIVE STERI FACE SHIELD 400-800

## (undated) DEVICE — SYSTEM HYDROGEL SPACEOAR INJEC 10ML SV-2101

## (undated) DEVICE — TUBING SUCTION 10'X3/16" N510

## (undated) DEVICE — ESU SUCTION COAG CAUTERY HAND CONTROL 10FR 6" 30-5200

## (undated) DEVICE — SU VICRYL 1 CT-1 36" J347H

## (undated) DEVICE — PACK MAJOR ORTHO SOP15MOFCA

## (undated) DEVICE — GLOVE BIOGEL PI INDICATOR 8.0 LF 41680

## (undated) DEVICE — COVER LIGHT HANDLE LT-F02

## (undated) DEVICE — PEN MARKING SKIN W/LABELS 31145918

## (undated) DEVICE — ESU ENDO FORCEP BX HOT FD-210U

## (undated) DEVICE — DRSG AQUACEL AG 3.5X12" HYDROFIBER 420670

## (undated) DEVICE — COVER TABLE L60 IN 2 TIER BACK STRL 8197-

## (undated) DEVICE — DRAPE TOWEL 17X27" BLUE LF DISP 28700-004

## (undated) DEVICE — GLOVE BIOGEL 7 LATEX

## (undated) DEVICE — ENDO TRAP POLYP E-TRAP 00711099

## (undated) DEVICE — ENDO SNARE EXACTO COLD 9MM LOOP 2.4MMX230CM 00711115

## (undated) DEVICE — DRAPE IOBAN ISOLATION VERTICAL 320X21CM 6617

## (undated) DEVICE — DRAPE TOP SURGICAL HD 59352

## (undated) DEVICE — PREP CHLORAPREP 26ML TINTED ORANGE  260815

## (undated) DEVICE — STPL SKIN PRECISE SYSTEM DS-15

## (undated) DEVICE — DRAPE U SHEET SPLIT W/TAPE 89079

## (undated) DEVICE — DRAPE STERI U 1015

## (undated) DEVICE — GLOVE PROTEXIS PI ORTHO PF 7.5 2D73HT75

## (undated) RX ORDER — PROPOFOL 10 MG/ML
INJECTION, EMULSION INTRAVENOUS
Status: DISPENSED
Start: 2023-10-27

## (undated) RX ORDER — TRANEXAMIC ACID 650 MG/1
TABLET ORAL
Status: DISPENSED
Start: 2023-11-03

## (undated) RX ORDER — PROPOFOL 10 MG/ML
INJECTION, EMULSION INTRAVENOUS
Status: DISPENSED
Start: 2018-10-01

## (undated) RX ORDER — BUPIVACAINE HYDROCHLORIDE 2.5 MG/ML
INJECTION, SOLUTION EPIDURAL; INFILTRATION; INTRACAUDAL
Status: DISPENSED
Start: 2023-11-03

## (undated) RX ORDER — CEFAZOLIN SODIUM/WATER 2 G/20 ML
SYRINGE (ML) INTRAVENOUS
Status: DISPENSED
Start: 2023-11-03

## (undated) RX ORDER — SODIUM CHLORIDE 9 MG/ML
INJECTION, SOLUTION INTRAVENOUS
Status: DISPENSED
Start: 2018-10-06

## (undated) RX ORDER — PROPOFOL 10 MG/ML
INJECTION, EMULSION INTRAVENOUS
Status: DISPENSED
Start: 2023-11-03

## (undated) RX ORDER — LIDOCAINE HYDROCHLORIDE 20 MG/ML
INJECTION, SOLUTION EPIDURAL; INFILTRATION; INTRACAUDAL; PERINEURAL
Status: DISPENSED
Start: 2018-10-01

## (undated) RX ORDER — ACETAMINOPHEN 325 MG/1
TABLET ORAL
Status: DISPENSED
Start: 2023-11-03

## (undated) RX ORDER — PANTOPRAZOLE SODIUM 40 MG/10ML
INJECTION, POWDER, LYOPHILIZED, FOR SOLUTION INTRAVENOUS
Status: DISPENSED
Start: 2018-10-06

## (undated) RX ORDER — DEXAMETHASONE SODIUM PHOSPHATE 10 MG/ML
INJECTION, SOLUTION INTRAMUSCULAR; INTRAVENOUS
Status: DISPENSED
Start: 2023-11-03